# Patient Record
Sex: FEMALE | Race: WHITE | NOT HISPANIC OR LATINO
[De-identification: names, ages, dates, MRNs, and addresses within clinical notes are randomized per-mention and may not be internally consistent; named-entity substitution may affect disease eponyms.]

---

## 2020-12-18 ENCOUNTER — TRANSCRIPTION ENCOUNTER (OUTPATIENT)
Age: 75
End: 2020-12-18

## 2020-12-22 ENCOUNTER — TRANSCRIPTION ENCOUNTER (OUTPATIENT)
Age: 75
End: 2020-12-22

## 2021-05-18 ENCOUNTER — INPATIENT (INPATIENT)
Facility: HOSPITAL | Age: 76
LOS: 15 days | Discharge: HOME HEALTH | End: 2021-06-03
Attending: SURGERY | Admitting: SURGERY
Payer: MEDICARE

## 2021-05-18 VITALS
DIASTOLIC BLOOD PRESSURE: 51 MMHG | HEIGHT: 59 IN | RESPIRATION RATE: 16 BRPM | HEART RATE: 122 BPM | WEIGHT: 139.99 LBS | SYSTOLIC BLOOD PRESSURE: 120 MMHG | OXYGEN SATURATION: 97 % | TEMPERATURE: 98 F

## 2021-05-18 LAB
ALBUMIN SERPL ELPH-MCNC: 3 G/DL — LOW (ref 3.5–5.2)
ALP SERPL-CCNC: 122 U/L — HIGH (ref 30–115)
ALT FLD-CCNC: 14 U/L — SIGNIFICANT CHANGE UP (ref 0–41)
ANION GAP SERPL CALC-SCNC: 14 MMOL/L — SIGNIFICANT CHANGE UP (ref 7–14)
ANION GAP SERPL CALC-SCNC: 16 MMOL/L — HIGH (ref 7–14)
ANION GAP SERPL CALC-SCNC: 16 MMOL/L — HIGH (ref 7–14)
APPEARANCE UR: ABNORMAL
APTT BLD: 26.2 SEC — LOW (ref 27–39.2)
AST SERPL-CCNC: 10 U/L — SIGNIFICANT CHANGE UP (ref 0–41)
BACTERIA # UR AUTO: ABNORMAL
BASOPHILS # BLD AUTO: 0 K/UL — SIGNIFICANT CHANGE UP (ref 0–0.2)
BASOPHILS # BLD AUTO: 0.03 K/UL — SIGNIFICANT CHANGE UP (ref 0–0.2)
BASOPHILS # BLD AUTO: 0.09 K/UL — SIGNIFICANT CHANGE UP (ref 0–0.2)
BASOPHILS NFR BLD AUTO: 0 % — SIGNIFICANT CHANGE UP (ref 0–1)
BASOPHILS NFR BLD AUTO: 0.2 % — SIGNIFICANT CHANGE UP (ref 0–1)
BASOPHILS NFR BLD AUTO: 0.5 % — SIGNIFICANT CHANGE UP (ref 0–1)
BILIRUB SERPL-MCNC: 0.5 MG/DL — SIGNIFICANT CHANGE UP (ref 0.2–1.2)
BILIRUB UR-MCNC: NEGATIVE — SIGNIFICANT CHANGE UP
BLD GP AB SCN SERPL QL: SIGNIFICANT CHANGE UP
BUN SERPL-MCNC: 32 MG/DL — HIGH (ref 10–20)
BUN SERPL-MCNC: 32 MG/DL — HIGH (ref 10–20)
BUN SERPL-MCNC: 37 MG/DL — HIGH (ref 10–20)
BURR CELLS BLD QL SMEAR: PRESENT — SIGNIFICANT CHANGE UP
CALCIUM SERPL-MCNC: 7.8 MG/DL — LOW (ref 8.5–10.1)
CALCIUM SERPL-MCNC: 8.1 MG/DL — LOW (ref 8.5–10.1)
CALCIUM SERPL-MCNC: 8.6 MG/DL — SIGNIFICANT CHANGE UP (ref 8.5–10.1)
CHLORIDE SERPL-SCNC: 102 MMOL/L — SIGNIFICANT CHANGE UP (ref 98–110)
CHLORIDE SERPL-SCNC: 95 MMOL/L — LOW (ref 98–110)
CHLORIDE SERPL-SCNC: 98 MMOL/L — SIGNIFICANT CHANGE UP (ref 98–110)
CK MB CFR SERPL CALC: 1.2 NG/ML — SIGNIFICANT CHANGE UP (ref 0.6–6.3)
CK SERPL-CCNC: 17 U/L — SIGNIFICANT CHANGE UP (ref 0–225)
CO2 SERPL-SCNC: 19 MMOL/L — SIGNIFICANT CHANGE UP (ref 17–32)
CO2 SERPL-SCNC: 20 MMOL/L — SIGNIFICANT CHANGE UP (ref 17–32)
CO2 SERPL-SCNC: 20 MMOL/L — SIGNIFICANT CHANGE UP (ref 17–32)
COLOR SPEC: YELLOW — SIGNIFICANT CHANGE UP
CREAT SERPL-MCNC: 0.9 MG/DL — SIGNIFICANT CHANGE UP (ref 0.7–1.5)
CREAT SERPL-MCNC: 1 MG/DL — SIGNIFICANT CHANGE UP (ref 0.7–1.5)
CREAT SERPL-MCNC: 1.4 MG/DL — SIGNIFICANT CHANGE UP (ref 0.7–1.5)
DIFF PNL FLD: NEGATIVE — SIGNIFICANT CHANGE UP
EOSINOPHIL # BLD AUTO: 0 K/UL — SIGNIFICANT CHANGE UP (ref 0–0.7)
EOSINOPHIL # BLD AUTO: 0 K/UL — SIGNIFICANT CHANGE UP (ref 0–0.7)
EOSINOPHIL # BLD AUTO: 0.04 K/UL — SIGNIFICANT CHANGE UP (ref 0–0.7)
EOSINOPHIL NFR BLD AUTO: 0 % — SIGNIFICANT CHANGE UP (ref 0–8)
EOSINOPHIL NFR BLD AUTO: 0 % — SIGNIFICANT CHANGE UP (ref 0–8)
EOSINOPHIL NFR BLD AUTO: 0.3 % — SIGNIFICANT CHANGE UP (ref 0–8)
EPI CELLS # UR: 16 /HPF — HIGH (ref 0–5)
GIANT PLATELETS BLD QL SMEAR: PRESENT — SIGNIFICANT CHANGE UP
GLUCOSE SERPL-MCNC: 125 MG/DL — HIGH (ref 70–99)
GLUCOSE SERPL-MCNC: 131 MG/DL — HIGH (ref 70–99)
GLUCOSE SERPL-MCNC: 143 MG/DL — HIGH (ref 70–99)
GLUCOSE UR QL: NEGATIVE — SIGNIFICANT CHANGE UP
HCT VFR BLD CALC: 30.1 % — LOW (ref 37–47)
HCT VFR BLD CALC: 31.2 % — LOW (ref 37–47)
HCT VFR BLD CALC: 35 % — LOW (ref 37–47)
HGB BLD-MCNC: 10.2 G/DL — LOW (ref 12–16)
HGB BLD-MCNC: 10.5 G/DL — LOW (ref 12–16)
HGB BLD-MCNC: 11.7 G/DL — LOW (ref 12–16)
HYALINE CASTS # UR AUTO: 14 /LPF — HIGH (ref 0–7)
IMM GRANULOCYTES NFR BLD AUTO: 0.5 % — HIGH (ref 0.1–0.3)
IMM GRANULOCYTES NFR BLD AUTO: 1.4 % — HIGH (ref 0.1–0.3)
INR BLD: 1.45 RATIO — HIGH (ref 0.65–1.3)
KETONES UR-MCNC: NEGATIVE — SIGNIFICANT CHANGE UP
LACTATE SERPL-SCNC: 1.3 MMOL/L — SIGNIFICANT CHANGE UP (ref 0.7–2)
LACTATE SERPL-SCNC: 1.5 MMOL/L — SIGNIFICANT CHANGE UP (ref 0.7–2)
LACTATE SERPL-SCNC: 1.9 MMOL/L — SIGNIFICANT CHANGE UP (ref 0.7–2)
LACTATE SERPL-SCNC: 2.6 MMOL/L — HIGH (ref 0.7–2)
LEUKOCYTE ESTERASE UR-ACNC: NEGATIVE — SIGNIFICANT CHANGE UP
LYMPHOCYTES # BLD AUTO: 0.23 K/UL — LOW (ref 1.2–3.4)
LYMPHOCYTES # BLD AUTO: 0.36 K/UL — LOW (ref 1.2–3.4)
LYMPHOCYTES # BLD AUTO: 0.47 K/UL — LOW (ref 1.2–3.4)
LYMPHOCYTES # BLD AUTO: 1.7 % — LOW (ref 20.5–51.1)
LYMPHOCYTES # BLD AUTO: 2.2 % — LOW (ref 20.5–51.1)
LYMPHOCYTES # BLD AUTO: 3.7 % — LOW (ref 20.5–51.1)
MAGNESIUM SERPL-MCNC: 1.8 MG/DL — SIGNIFICANT CHANGE UP (ref 1.8–2.4)
MAGNESIUM SERPL-MCNC: 2.1 MG/DL — SIGNIFICANT CHANGE UP (ref 1.8–2.4)
MANUAL SMEAR VERIFICATION: SIGNIFICANT CHANGE UP
MCHC RBC-ENTMCNC: 27.6 PG — SIGNIFICANT CHANGE UP (ref 27–31)
MCHC RBC-ENTMCNC: 27.8 PG — SIGNIFICANT CHANGE UP (ref 27–31)
MCHC RBC-ENTMCNC: 27.9 PG — SIGNIFICANT CHANGE UP (ref 27–31)
MCHC RBC-ENTMCNC: 33.4 G/DL — SIGNIFICANT CHANGE UP (ref 32–37)
MCHC RBC-ENTMCNC: 33.7 G/DL — SIGNIFICANT CHANGE UP (ref 32–37)
MCHC RBC-ENTMCNC: 33.9 G/DL — SIGNIFICANT CHANGE UP (ref 32–37)
MCV RBC AUTO: 81.6 FL — SIGNIFICANT CHANGE UP (ref 81–99)
MCV RBC AUTO: 82.5 FL — SIGNIFICANT CHANGE UP (ref 81–99)
MCV RBC AUTO: 83.3 FL — SIGNIFICANT CHANGE UP (ref 81–99)
METAMYELOCYTES # FLD: 2.6 % — HIGH (ref 0–0)
MONOCYTES # BLD AUTO: 0.48 K/UL — SIGNIFICANT CHANGE UP (ref 0.1–0.6)
MONOCYTES # BLD AUTO: 0.48 K/UL — SIGNIFICANT CHANGE UP (ref 0.1–0.6)
MONOCYTES # BLD AUTO: 0.65 K/UL — HIGH (ref 0.1–0.6)
MONOCYTES NFR BLD AUTO: 3.5 % — SIGNIFICANT CHANGE UP (ref 1.7–9.3)
MONOCYTES NFR BLD AUTO: 3.8 % — SIGNIFICANT CHANGE UP (ref 1.7–9.3)
MONOCYTES NFR BLD AUTO: 3.9 % — SIGNIFICANT CHANGE UP (ref 1.7–9.3)
NEUTROPHILS # BLD AUTO: 11.55 K/UL — HIGH (ref 1.4–6.5)
NEUTROPHILS # BLD AUTO: 12.54 K/UL — HIGH (ref 1.4–6.5)
NEUTROPHILS # BLD AUTO: 15.39 K/UL — HIGH (ref 1.4–6.5)
NEUTROPHILS NFR BLD AUTO: 91.3 % — HIGH (ref 42.2–75.2)
NEUTROPHILS NFR BLD AUTO: 91.5 % — HIGH (ref 42.2–75.2)
NEUTROPHILS NFR BLD AUTO: 92 % — HIGH (ref 42.2–75.2)
NEUTS BAND # BLD: 0.9 % — SIGNIFICANT CHANGE UP (ref 0–6)
NITRITE UR-MCNC: NEGATIVE — SIGNIFICANT CHANGE UP
NRBC # BLD: 0 /100 WBCS — SIGNIFICANT CHANGE UP (ref 0–0)
NRBC # BLD: 0 /100 WBCS — SIGNIFICANT CHANGE UP (ref 0–0)
PH UR: 6 — SIGNIFICANT CHANGE UP (ref 5–8)
PHOSPHATE SERPL-MCNC: 2.8 MG/DL — SIGNIFICANT CHANGE UP (ref 2.1–4.9)
PHOSPHATE SERPL-MCNC: 3.3 MG/DL — SIGNIFICANT CHANGE UP (ref 2.1–4.9)
PLAT MORPH BLD: NORMAL — SIGNIFICANT CHANGE UP
PLATELET # BLD AUTO: 324 K/UL — SIGNIFICANT CHANGE UP (ref 130–400)
PLATELET # BLD AUTO: 338 K/UL — SIGNIFICANT CHANGE UP (ref 130–400)
PLATELET # BLD AUTO: 349 K/UL — SIGNIFICANT CHANGE UP (ref 130–400)
POIKILOCYTOSIS BLD QL AUTO: SIGNIFICANT CHANGE UP
POLYCHROMASIA BLD QL SMEAR: SLIGHT — SIGNIFICANT CHANGE UP
POTASSIUM SERPL-MCNC: 3.2 MMOL/L — LOW (ref 3.5–5)
POTASSIUM SERPL-SCNC: 3.2 MMOL/L — LOW (ref 3.5–5)
PROT SERPL-MCNC: 5.6 G/DL — LOW (ref 6–8)
PROT UR-MCNC: ABNORMAL
PROTHROM AB SERPL-ACNC: 16.7 SEC — HIGH (ref 9.95–12.87)
RBC # BLD: 3.69 M/UL — LOW (ref 4.2–5.4)
RBC # BLD: 3.78 M/UL — LOW (ref 4.2–5.4)
RBC # BLD: 4.2 M/UL — SIGNIFICANT CHANGE UP (ref 4.2–5.4)
RBC # FLD: 14 % — SIGNIFICANT CHANGE UP (ref 11.5–14.5)
RBC # FLD: 14.2 % — SIGNIFICANT CHANGE UP (ref 11.5–14.5)
RBC # FLD: 14.3 % — SIGNIFICANT CHANGE UP (ref 11.5–14.5)
RBC BLD AUTO: ABNORMAL
RBC CASTS # UR COMP ASSIST: 8 /HPF — HIGH (ref 0–4)
SARS-COV-2 RNA SPEC QL NAA+PROBE: SIGNIFICANT CHANGE UP
SODIUM SERPL-SCNC: 131 MMOL/L — LOW (ref 135–146)
SODIUM SERPL-SCNC: 134 MMOL/L — LOW (ref 135–146)
SODIUM SERPL-SCNC: 135 MMOL/L — SIGNIFICANT CHANGE UP (ref 135–146)
SP GR SPEC: 1.02 — SIGNIFICANT CHANGE UP (ref 1.01–1.03)
TROPONIN T SERPL-MCNC: <0.01 NG/ML — SIGNIFICANT CHANGE UP
TROPONIN T SERPL-MCNC: <0.01 NG/ML — SIGNIFICANT CHANGE UP
UROBILINOGEN FLD QL: SIGNIFICANT CHANGE UP
WBC # BLD: 12.63 K/UL — HIGH (ref 4.8–10.8)
WBC # BLD: 13.6 K/UL — HIGH (ref 4.8–10.8)
WBC # BLD: 16.72 K/UL — HIGH (ref 4.8–10.8)
WBC # FLD AUTO: 12.63 K/UL — HIGH (ref 4.8–10.8)
WBC # FLD AUTO: 13.6 K/UL — HIGH (ref 4.8–10.8)
WBC # FLD AUTO: 16.72 K/UL — HIGH (ref 4.8–10.8)
WBC UR QL: 16 /HPF — HIGH (ref 0–5)

## 2021-05-18 PROCEDURE — 76937 US GUIDE VASCULAR ACCESS: CPT | Mod: 26

## 2021-05-18 PROCEDURE — 93010 ELECTROCARDIOGRAM REPORT: CPT | Mod: 76

## 2021-05-18 PROCEDURE — 36000 PLACE NEEDLE IN VEIN: CPT

## 2021-05-18 PROCEDURE — 99285 EMERGENCY DEPT VISIT HI MDM: CPT | Mod: 25

## 2021-05-18 PROCEDURE — 71045 X-RAY EXAM CHEST 1 VIEW: CPT | Mod: 26

## 2021-05-18 RX ORDER — PANTOPRAZOLE SODIUM 20 MG/1
40 TABLET, DELAYED RELEASE ORAL
Refills: 0 | Status: DISCONTINUED | OUTPATIENT
Start: 2021-05-18 | End: 2021-05-20

## 2021-05-18 RX ORDER — TRAZODONE HCL 50 MG
50 TABLET ORAL AT BEDTIME
Refills: 0 | Status: DISCONTINUED | OUTPATIENT
Start: 2021-05-18 | End: 2021-05-20

## 2021-05-18 RX ORDER — IBUPROFEN 200 MG
400 TABLET ORAL EVERY 6 HOURS
Refills: 0 | Status: DISCONTINUED | OUTPATIENT
Start: 2021-05-18 | End: 2021-05-20

## 2021-05-18 RX ORDER — HEPARIN SODIUM 5000 [USP'U]/ML
5000 INJECTION INTRAVENOUS; SUBCUTANEOUS EVERY 8 HOURS
Refills: 0 | Status: DISCONTINUED | OUTPATIENT
Start: 2021-05-18 | End: 2021-05-20

## 2021-05-18 RX ORDER — MORPHINE SULFATE 50 MG/1
2 CAPSULE, EXTENDED RELEASE ORAL ONCE
Refills: 0 | Status: DISCONTINUED | OUTPATIENT
Start: 2021-05-18 | End: 2021-05-18

## 2021-05-18 RX ORDER — VANCOMYCIN HCL 1 G
1000 VIAL (EA) INTRAVENOUS ONCE
Refills: 0 | Status: COMPLETED | OUTPATIENT
Start: 2021-05-18 | End: 2021-05-18

## 2021-05-18 RX ORDER — PIPERACILLIN AND TAZOBACTAM 4; .5 G/20ML; G/20ML
3.38 INJECTION, POWDER, LYOPHILIZED, FOR SOLUTION INTRAVENOUS ONCE
Refills: 0 | Status: COMPLETED | OUTPATIENT
Start: 2021-05-18 | End: 2021-05-18

## 2021-05-18 RX ORDER — SENNA PLUS 8.6 MG/1
2 TABLET ORAL AT BEDTIME
Refills: 0 | Status: DISCONTINUED | OUTPATIENT
Start: 2021-05-18 | End: 2021-05-20

## 2021-05-18 RX ORDER — POTASSIUM CHLORIDE 20 MEQ
20 PACKET (EA) ORAL ONCE
Refills: 0 | Status: COMPLETED | OUTPATIENT
Start: 2021-05-18 | End: 2021-05-18

## 2021-05-18 RX ORDER — POTASSIUM CHLORIDE 20 MEQ
40 PACKET (EA) ORAL ONCE
Refills: 0 | Status: DISCONTINUED | OUTPATIENT
Start: 2021-05-18 | End: 2021-05-18

## 2021-05-18 RX ORDER — ACETAMINOPHEN 500 MG
650 TABLET ORAL EVERY 6 HOURS
Refills: 0 | Status: DISCONTINUED | OUTPATIENT
Start: 2021-05-18 | End: 2021-05-20

## 2021-05-18 RX ORDER — ONDANSETRON 8 MG/1
4 TABLET, FILM COATED ORAL ONCE
Refills: 0 | Status: COMPLETED | OUTPATIENT
Start: 2021-05-18 | End: 2021-05-18

## 2021-05-18 RX ORDER — MORPHINE SULFATE 50 MG/1
4 CAPSULE, EXTENDED RELEASE ORAL ONCE
Refills: 0 | Status: DISCONTINUED | OUTPATIENT
Start: 2021-05-18 | End: 2021-05-18

## 2021-05-18 RX ORDER — OXYCODONE HYDROCHLORIDE 5 MG/1
5 TABLET ORAL EVERY 6 HOURS
Refills: 0 | Status: DISCONTINUED | OUTPATIENT
Start: 2021-05-18 | End: 2021-05-20

## 2021-05-18 RX ORDER — POTASSIUM CHLORIDE 20 MEQ
20 PACKET (EA) ORAL
Refills: 0 | Status: COMPLETED | OUTPATIENT
Start: 2021-05-18 | End: 2021-05-19

## 2021-05-18 RX ORDER — ACETAMINOPHEN 500 MG
650 TABLET ORAL ONCE
Refills: 0 | Status: COMPLETED | OUTPATIENT
Start: 2021-05-18 | End: 2021-05-18

## 2021-05-18 RX ORDER — LISINOPRIL 2.5 MG/1
20 TABLET ORAL DAILY
Refills: 0 | Status: DISCONTINUED | OUTPATIENT
Start: 2021-05-18 | End: 2021-05-20

## 2021-05-18 RX ORDER — ALPRAZOLAM 0.25 MG
0.25 TABLET ORAL
Refills: 0 | Status: DISCONTINUED | OUTPATIENT
Start: 2021-05-18 | End: 2021-05-18

## 2021-05-18 RX ORDER — CHLORHEXIDINE GLUCONATE 213 G/1000ML
1 SOLUTION TOPICAL
Refills: 0 | Status: DISCONTINUED | OUTPATIENT
Start: 2021-05-18 | End: 2021-05-20

## 2021-05-18 RX ORDER — SODIUM CHLORIDE 9 MG/ML
1000 INJECTION, SOLUTION INTRAVENOUS
Refills: 0 | Status: DISCONTINUED | OUTPATIENT
Start: 2021-05-18 | End: 2021-05-18

## 2021-05-18 RX ORDER — SODIUM CHLORIDE 9 MG/ML
1950 INJECTION, SOLUTION INTRAVENOUS ONCE
Refills: 0 | Status: COMPLETED | OUTPATIENT
Start: 2021-05-18 | End: 2021-05-18

## 2021-05-18 RX ORDER — DEXTROSE MONOHYDRATE, SODIUM CHLORIDE, AND POTASSIUM CHLORIDE 50; .745; 4.5 G/1000ML; G/1000ML; G/1000ML
1000 INJECTION, SOLUTION INTRAVENOUS
Refills: 0 | Status: DISCONTINUED | OUTPATIENT
Start: 2021-05-18 | End: 2021-05-20

## 2021-05-18 RX ORDER — MAGNESIUM SULFATE 500 MG/ML
2 VIAL (ML) INJECTION ONCE
Refills: 0 | Status: COMPLETED | OUTPATIENT
Start: 2021-05-18 | End: 2021-05-18

## 2021-05-18 RX ORDER — POTASSIUM CHLORIDE 20 MEQ
20 PACKET (EA) ORAL ONCE
Refills: 0 | Status: DISCONTINUED | OUTPATIENT
Start: 2021-05-18 | End: 2021-05-18

## 2021-05-18 RX ORDER — LISINOPRIL 2.5 MG/1
0 TABLET ORAL
Qty: 0 | Refills: 1 | DISCHARGE

## 2021-05-18 RX ORDER — POTASSIUM CHLORIDE 20 MEQ
40 PACKET (EA) ORAL ONCE
Refills: 0 | Status: COMPLETED | OUTPATIENT
Start: 2021-05-18 | End: 2021-05-18

## 2021-05-18 RX ADMIN — Medication 50 GRAM(S): at 15:13

## 2021-05-18 RX ADMIN — Medication 1 TABLET(S): at 17:58

## 2021-05-18 RX ADMIN — Medication 650 MILLIGRAM(S): at 10:12

## 2021-05-18 RX ADMIN — Medication 250 MILLIGRAM(S): at 11:57

## 2021-05-18 RX ADMIN — Medication 40 MILLIEQUIVALENT(S): at 12:30

## 2021-05-18 RX ADMIN — MORPHINE SULFATE 2 MILLIGRAM(S): 50 CAPSULE, EXTENDED RELEASE ORAL at 15:23

## 2021-05-18 RX ADMIN — SODIUM CHLORIDE 100 MILLILITER(S): 9 INJECTION, SOLUTION INTRAVENOUS at 17:59

## 2021-05-18 RX ADMIN — OXYCODONE HYDROCHLORIDE 5 MILLIGRAM(S): 5 TABLET ORAL at 22:34

## 2021-05-18 RX ADMIN — Medication 50 MILLIEQUIVALENT(S): at 13:15

## 2021-05-18 RX ADMIN — PIPERACILLIN AND TAZOBACTAM 200 GRAM(S): 4; .5 INJECTION, POWDER, LYOPHILIZED, FOR SOLUTION INTRAVENOUS at 11:56

## 2021-05-18 RX ADMIN — Medication 650 MILLIGRAM(S): at 22:35

## 2021-05-18 RX ADMIN — Medication 100 MILLIGRAM(S): at 10:35

## 2021-05-18 RX ADMIN — HEPARIN SODIUM 5000 UNIT(S): 5000 INJECTION INTRAVENOUS; SUBCUTANEOUS at 21:14

## 2021-05-18 RX ADMIN — MORPHINE SULFATE 4 MILLIGRAM(S): 50 CAPSULE, EXTENDED RELEASE ORAL at 10:12

## 2021-05-18 RX ADMIN — HEPARIN SODIUM 5000 UNIT(S): 5000 INJECTION INTRAVENOUS; SUBCUTANEOUS at 15:13

## 2021-05-18 RX ADMIN — Medication 650 MILLIGRAM(S): at 21:13

## 2021-05-18 RX ADMIN — SODIUM CHLORIDE 1950 MILLILITER(S): 9 INJECTION, SOLUTION INTRAVENOUS at 10:12

## 2021-05-18 RX ADMIN — OXYCODONE HYDROCHLORIDE 5 MILLIGRAM(S): 5 TABLET ORAL at 23:37

## 2021-05-18 RX ADMIN — SODIUM CHLORIDE 100 MILLILITER(S): 9 INJECTION, SOLUTION INTRAVENOUS at 15:29

## 2021-05-18 RX ADMIN — SENNA PLUS 2 TABLET(S): 8.6 TABLET ORAL at 21:14

## 2021-05-18 NOTE — ED PROVIDER NOTE - CARE PLAN
Principal Discharge DX:	Sepsis  Secondary Diagnosis:	Breast infection in female  Secondary Diagnosis:	Hypokalemia

## 2021-05-18 NOTE — ED PROVIDER NOTE - PROGRESS NOTE DETAILS
DC: Surgery consulted. will evaluate. BI: 75 y.o F w/ pmhx DM?, HTN? p/w pain to R breast. Pt had biopsy done appx 10 days ago and since then has been having progressively worsening R breast pain at site of biopsy DC: Patient evaluated by surgery. SEPSIS labs ordered. pending dispo.

## 2021-05-18 NOTE — ED PROVIDER NOTE - ADMIT DISPOSITION PRESENT ON ADMISSION SEPSIS Q3 - REPEAT LACTATE WAS DRAWN
Pt at this time is not a rehab candidate and is unable to actively participate in PT, therefore d/c from PT program at this time. Repeat lactate was drawn.

## 2021-05-18 NOTE — H&P ADULT - HISTORY OF PRESENT ILLNESS
75F w/ PMH of HTN and b/l lumpectomies (10-15 years ago, reportedly benign pathology) presented to the ED with worsening R breast pain. Pt reports R breast biopsy last week Tuesday, 5/11. She was told to keep the dressing on for 5 days. Since the biopsy, she has been experiencing R breast pain. 2 days ago, she removed the dressing and saw erythema and small amount of necrotic tissue overlying R underside of breast. This morning the abscess ruptured and began putting out pus and necrotic tissue. In the ED, T 100.9, tachycardic to 110s.     Of note, pt reports 30 pound weight loss in the past 3 months. Denies PMH of cancer. 75F w/ PMH of HTN and b/l lumpectomies (10-15 years ago, reportedly benign pathology) presented to the ED with worsening R breast pain. Pt reports R breast biopsy last week Tuesday, 5/11. She was told to keep the dressing on for 5 days. Since the biopsy, she has been experiencing R breast pain. 2 days ago, she removed the dressing and saw erythema and small amount of necrotic tissue overlying R underside of breast. This morning the abscess ruptured and began putting out pus and necrotic tissue. In the ED, T 100.9, tachycardic to 110s.     Of note, pt reports 30 pound weight loss in the past 3 months. Denies PMH of cancer. In the ED, pt tachycardic to 110s, febrile to 100.9F. WBC 16.7, lactate 2.6. Received 1950cc LR, vancomycin, zosyn, and clindamycin.

## 2021-05-18 NOTE — ED ADULT TRIAGE NOTE - CHIEF COMPLAINT QUOTE
patient brought in for wound check to right breast, patient had biopsy to right breast a week ago. patient has drainage, and odor to site

## 2021-05-18 NOTE — H&P ADULT - NSHPPHYSICALEXAM_GEN_ALL_CORE
GENERAL: NAD  BREAST: R breast erythema/induration from 3 o'clock to 9 o'clock, necrotic tissue overlying, skin sloughing  ABDOMEN: Soft, Nontender, Nondistended;   EXTREMITIES: No clubbing, cyanosis, or edema

## 2021-05-18 NOTE — H&P ADULT - ASSESSMENT
75F w/ PMH of HTN and b/l lumpectomies (10-15 years ago, reportedly benign pathology) presented to the ED with worsening R breast pain found to have a necrotizing right breast infection.     PLAN:   - admit to surgery under Dr. Wagner  - R breast s/p bedside incision and drainage with findings of necrotic tissue  - augmentin   - will redress wound later today to decide regarding need for OR  - d/w pt, pt's daughter, and Dr. Wagner 75F w/ PMH of HTN and b/l lumpectomies (10-15 years ago, reportedly benign pathology) presented to the ED with worsening R breast pain found to have a necrotizing right breast infection.     PLAN:   - admit to surgery under Dr. Wagner  - R breast abscess s/p bedside incision and drainage with findings of necrotic tissue; swab sent for culture  - abx: augmentin   - will redress wound later today to decide regarding need for OR  - abnormal EKG, negative troponin --> will repeat EKG  - FU 4pm labs  - d/w pt, pt's daughter, and Dr. Wagner

## 2021-05-18 NOTE — ED PROVIDER NOTE - NS ED MD TWO NIGHTS YN
Refill for [place] pending Physician approval, script cannot be e-scribed, please route back to pool after completed so patient can be called when ready for  [phone].     Patient last seen 12/5/16  Last prescribed 1/5/17 Yes

## 2021-05-18 NOTE — H&P ADULT - NSHPLABSRESULTS_GEN_ALL_CORE
11.7   16.72 )-----------( 349      ( 18 May 2021 10:30 )             35.0       Auto Neutrophil %: 92.0 % (05-18-21 @ 10:30)  Auto Immature Granulocyte %: 1.4 % (05-18-21 @ 10:30)    05-18    131<L>  |  95<L>  |  37<H>  ----------------------------<  125<H>  3.2<L>   |  20  |  1.4    Calcium, Total Serum: 8.6 mg/dL (05-18-21 @ 10:30)    LFTs:             5.6  | 0.5  | 10       ------------------[122     ( 18 May 2021 10:30 )  3.0  | x    | 14          Lactate, Blood: 2.6 mmol/L (05-18-21 @ 10:30)    Coags:     16.70  ----< 1.45    ( 18 May 2021 10:30 )     26.2      CARDIAC MARKERS ( 18 May 2021 11:30 )  x     / <0.01 ng/mL / x     / x     / x

## 2021-05-18 NOTE — ED PROVIDER NOTE - CLINICAL SUMMARY MEDICAL DECISION MAKING FREE TEXT BOX
75 y.o. female, PMH of HTN and b/l lumpectomies (10-15 years ago, reportedly benign pathology) presented to the ED with worsening R breast pain. Pt reports R breast biopsy last week Tuesday, 5/11. She was told to keep the dressing on for 5 days. Since the biopsy, she has been experiencing R breast pain. 2 days ago, she removed the dressing and saw erythema and small amount of necrotic tissue overlying R underside of breast. This morning the abscess ruptured and began putting out pus and necrotic tissue. On exam, pt in NAD, AAOx3, head NC/AT, CN II-XII intact, lungs CTA B/L, CV S1S2 regular, breast (+) necrotic right breast with surrounding erythema and large amt of foul smelling discharge, abdomen soft/NT/ND/(+)BS, ext (-) edema. Pt is septic. Labs done. IV abx given. Surgery did I&D at bedside. Will admit

## 2021-05-18 NOTE — ED PROVIDER NOTE - ATTENDING CONTRIBUTION TO CARE
75 y.o. female, PMH of HTN and b/l lumpectomies (10-15 years ago, reportedly benign pathology) presented to the ED with worsening R breast pain. Pt reports R breast biopsy last week Tuesday, 5/11. She was told to keep the dressing on for 5 days. Since the biopsy, she has been experiencing R breast pain. 2 days ago, she removed the dressing and saw erythema and small amount of necrotic tissue overlying R underside of breast. This morning the abscess ruptured and began putting out pus and necrotic tissue. On exam, pt in NAD, AAOx3, head NC/AT, CN II-XII intact, lungs CTA B/L, CV S1S2 regular, breast (+) necrotic right breast with surrounding erythema and large amt of foul smelling discharge, abdomen soft/NT/ND/(+)BS, ext (-) edema. Pt is septic. Labs done. IV abx given. Surgery did I&D at bedside. Will admit.

## 2021-05-18 NOTE — ED PROVIDER NOTE - PHYSICAL EXAMINATION
CONSTITUTIONAL: Well-developed; well-nourished; appears uncomfortable.   SKIN: warm, dry.  HEAD: Normocephalic; atraumatic.  EYES: EOMI, no conjunctival erythema.  ENT: No nasal discharge; airway clear.  NECK: Supple; non tender.  CARD: S1, S2 normal; no murmurs, gallops, or rubs. +tachycardic.   CHEST WALL: Right breast is gangrenous, fluctuant, foul smelling discharge coming from breast surrounding erythema, tenderness.  RESP: No wheezes, rales or rhonchi.  ABD: soft ntnd.  EXT: Normal ROM.  No clubbing, cyanosis or edema.   NEURO: Alert, oriented, grossly unremarkable.  PSYCH: Cooperative, appropriate.

## 2021-05-18 NOTE — ED PROCEDURE NOTE - ATTENDING CONTRIBUTION TO CARE
I was present for and supervised the key and critical aspects of the procedures performed during the care of the patient. I personally supervised the study.  I reviewed the images and interpretation by the resident/ACP and have edited where appropriate.

## 2021-05-18 NOTE — ED PROVIDER NOTE - NS ED ROS FT
Constitutional: No fevers. +chills.   Eyes:  No visual changes, eye pain or discharge.  ENMT:  No sore throat or runny nose.  Cardiac:  No chest pain, SOB or edema.   BREAST: +pain, swelling, discharge.   Respiratory:  No cough or respiratory distress. No hemoptysis.   GI:  +nausea.   :  No dysuria, frequency or burning.  MS:  No myalgia, muscle weakness, joint pain or back pain.  Neuro:  No headache or weakness.  No LOC.  Skin:  No skin rash.   Endocrine: No history of thyroid disease or diabetes.

## 2021-05-18 NOTE — ED ADULT NURSE NOTE - OBJECTIVE STATEMENT
Pt is a 75y old female c/o right breast discharge d/p biopsy 5 days ago. Pt has yellow drainage and foul odor. NKDA.

## 2021-05-19 LAB
ANION GAP SERPL CALC-SCNC: 11 MMOL/L — SIGNIFICANT CHANGE UP (ref 7–14)
ANION GAP SERPL CALC-SCNC: 12 MMOL/L — SIGNIFICANT CHANGE UP (ref 7–14)
APTT BLD: 24.4 SEC — LOW (ref 27–39.2)
BASOPHILS # BLD AUTO: 0.04 K/UL — SIGNIFICANT CHANGE UP (ref 0–0.2)
BASOPHILS NFR BLD AUTO: 0.3 % — SIGNIFICANT CHANGE UP (ref 0–1)
BLD GP AB SCN SERPL QL: SIGNIFICANT CHANGE UP
BUN SERPL-MCNC: 22 MG/DL — HIGH (ref 10–20)
BUN SERPL-MCNC: 26 MG/DL — HIGH (ref 10–20)
CALCIUM SERPL-MCNC: 7.7 MG/DL — LOW (ref 8.5–10.1)
CALCIUM SERPL-MCNC: 7.8 MG/DL — LOW (ref 8.5–10.1)
CHLORIDE SERPL-SCNC: 101 MMOL/L — SIGNIFICANT CHANGE UP (ref 98–110)
CHLORIDE SERPL-SCNC: 103 MMOL/L — SIGNIFICANT CHANGE UP (ref 98–110)
CO2 SERPL-SCNC: 18 MMOL/L — SIGNIFICANT CHANGE UP (ref 17–32)
CO2 SERPL-SCNC: 20 MMOL/L — SIGNIFICANT CHANGE UP (ref 17–32)
COVID-19 SPIKE DOMAIN AB INTERP: POSITIVE
COVID-19 SPIKE DOMAIN ANTIBODY RESULT: 140 U/ML — HIGH
CREAT SERPL-MCNC: 0.7 MG/DL — SIGNIFICANT CHANGE UP (ref 0.7–1.5)
CREAT SERPL-MCNC: 0.8 MG/DL — SIGNIFICANT CHANGE UP (ref 0.7–1.5)
EOSINOPHIL # BLD AUTO: 0.1 K/UL — SIGNIFICANT CHANGE UP (ref 0–0.7)
EOSINOPHIL NFR BLD AUTO: 0.8 % — SIGNIFICANT CHANGE UP (ref 0–8)
GLUCOSE SERPL-MCNC: 132 MG/DL — HIGH (ref 70–99)
GLUCOSE SERPL-MCNC: 138 MG/DL — HIGH (ref 70–99)
HCT VFR BLD CALC: 30.4 % — LOW (ref 37–47)
HGB BLD-MCNC: 10.2 G/DL — LOW (ref 12–16)
IMM GRANULOCYTES NFR BLD AUTO: 1.9 % — HIGH (ref 0.1–0.3)
INR BLD: 1.23 RATIO — SIGNIFICANT CHANGE UP (ref 0.65–1.3)
LYMPHOCYTES # BLD AUTO: 0.74 K/UL — LOW (ref 1.2–3.4)
LYMPHOCYTES # BLD AUTO: 6 % — LOW (ref 20.5–51.1)
MAGNESIUM SERPL-MCNC: 1.7 MG/DL — LOW (ref 1.8–2.4)
MAGNESIUM SERPL-MCNC: 1.8 MG/DL — SIGNIFICANT CHANGE UP (ref 1.8–2.4)
MCHC RBC-ENTMCNC: 27.8 PG — SIGNIFICANT CHANGE UP (ref 27–31)
MCHC RBC-ENTMCNC: 33.6 G/DL — SIGNIFICANT CHANGE UP (ref 32–37)
MCV RBC AUTO: 82.8 FL — SIGNIFICANT CHANGE UP (ref 81–99)
MONOCYTES # BLD AUTO: 0.57 K/UL — SIGNIFICANT CHANGE UP (ref 0.1–0.6)
MONOCYTES NFR BLD AUTO: 4.6 % — SIGNIFICANT CHANGE UP (ref 1.7–9.3)
MRSA PCR RESULT.: NEGATIVE — SIGNIFICANT CHANGE UP
NEUTROPHILS # BLD AUTO: 10.7 K/UL — HIGH (ref 1.4–6.5)
NEUTROPHILS NFR BLD AUTO: 86.4 % — HIGH (ref 42.2–75.2)
NRBC # BLD: 0 /100 WBCS — SIGNIFICANT CHANGE UP (ref 0–0)
PHOSPHATE SERPL-MCNC: 2.6 MG/DL — SIGNIFICANT CHANGE UP (ref 2.1–4.9)
PHOSPHATE SERPL-MCNC: 2.8 MG/DL — SIGNIFICANT CHANGE UP (ref 2.1–4.9)
PLATELET # BLD AUTO: 313 K/UL — SIGNIFICANT CHANGE UP (ref 130–400)
POTASSIUM SERPL-MCNC: 3.7 MMOL/L — SIGNIFICANT CHANGE UP (ref 3.5–5)
POTASSIUM SERPL-MCNC: 4 MMOL/L — SIGNIFICANT CHANGE UP (ref 3.5–5)
POTASSIUM SERPL-SCNC: 3.7 MMOL/L — SIGNIFICANT CHANGE UP (ref 3.5–5)
POTASSIUM SERPL-SCNC: 4 MMOL/L — SIGNIFICANT CHANGE UP (ref 3.5–5)
PROTHROM AB SERPL-ACNC: 14.2 SEC — HIGH (ref 9.95–12.87)
RBC # BLD: 3.67 M/UL — LOW (ref 4.2–5.4)
RBC # FLD: 14.6 % — HIGH (ref 11.5–14.5)
SARS-COV-2 IGG+IGM SERPL QL IA: 140 U/ML — HIGH
SARS-COV-2 IGG+IGM SERPL QL IA: POSITIVE
SODIUM SERPL-SCNC: 132 MMOL/L — LOW (ref 135–146)
SODIUM SERPL-SCNC: 133 MMOL/L — LOW (ref 135–146)
WBC # BLD: 12.38 K/UL — HIGH (ref 4.8–10.8)
WBC # FLD AUTO: 12.38 K/UL — HIGH (ref 4.8–10.8)

## 2021-05-19 RX ORDER — AMPICILLIN SODIUM AND SULBACTAM SODIUM 250; 125 MG/ML; MG/ML
1.5 INJECTION, POWDER, FOR SUSPENSION INTRAMUSCULAR; INTRAVENOUS ONCE
Refills: 0 | Status: COMPLETED | OUTPATIENT
Start: 2021-05-19 | End: 2021-05-19

## 2021-05-19 RX ORDER — AMPICILLIN SODIUM AND SULBACTAM SODIUM 250; 125 MG/ML; MG/ML
1.5 INJECTION, POWDER, FOR SUSPENSION INTRAMUSCULAR; INTRAVENOUS EVERY 6 HOURS
Refills: 0 | Status: DISCONTINUED | OUTPATIENT
Start: 2021-05-20 | End: 2021-05-20

## 2021-05-19 RX ORDER — MAGNESIUM SULFATE 500 MG/ML
2 VIAL (ML) INJECTION ONCE
Refills: 0 | Status: COMPLETED | OUTPATIENT
Start: 2021-05-19 | End: 2021-05-19

## 2021-05-19 RX ORDER — AMPICILLIN SODIUM AND SULBACTAM SODIUM 250; 125 MG/ML; MG/ML
INJECTION, POWDER, FOR SUSPENSION INTRAMUSCULAR; INTRAVENOUS
Refills: 0 | Status: DISCONTINUED | OUTPATIENT
Start: 2021-05-19 | End: 2021-05-20

## 2021-05-19 RX ADMIN — LISINOPRIL 20 MILLIGRAM(S): 2.5 TABLET ORAL at 05:05

## 2021-05-19 RX ADMIN — HEPARIN SODIUM 5000 UNIT(S): 5000 INJECTION INTRAVENOUS; SUBCUTANEOUS at 21:35

## 2021-05-19 RX ADMIN — Medication 1 TABLET(S): at 05:05

## 2021-05-19 RX ADMIN — Medication 650 MILLIGRAM(S): at 14:44

## 2021-05-19 RX ADMIN — Medication 16.67 GRAM(S): at 06:28

## 2021-05-19 RX ADMIN — Medication 50 MILLIGRAM(S): at 00:46

## 2021-05-19 RX ADMIN — Medication 650 MILLIGRAM(S): at 14:53

## 2021-05-19 RX ADMIN — DEXTROSE MONOHYDRATE, SODIUM CHLORIDE, AND POTASSIUM CHLORIDE 100 MILLILITER(S): 50; .745; 4.5 INJECTION, SOLUTION INTRAVENOUS at 15:20

## 2021-05-19 RX ADMIN — SENNA PLUS 2 TABLET(S): 8.6 TABLET ORAL at 21:35

## 2021-05-19 RX ADMIN — DEXTROSE MONOHYDRATE, SODIUM CHLORIDE, AND POTASSIUM CHLORIDE 100 MILLILITER(S): 50; .745; 4.5 INJECTION, SOLUTION INTRAVENOUS at 00:45

## 2021-05-19 RX ADMIN — PANTOPRAZOLE SODIUM 40 MILLIGRAM(S): 20 TABLET, DELAYED RELEASE ORAL at 05:06

## 2021-05-19 RX ADMIN — HEPARIN SODIUM 5000 UNIT(S): 5000 INJECTION INTRAVENOUS; SUBCUTANEOUS at 14:42

## 2021-05-19 RX ADMIN — Medication 50 MILLIEQUIVALENT(S): at 00:45

## 2021-05-19 RX ADMIN — Medication 50 MILLIEQUIVALENT(S): at 05:05

## 2021-05-19 RX ADMIN — OXYCODONE HYDROCHLORIDE 5 MILLIGRAM(S): 5 TABLET ORAL at 08:47

## 2021-05-19 RX ADMIN — Medication 50 MILLIEQUIVALENT(S): at 02:59

## 2021-05-19 RX ADMIN — OXYCODONE HYDROCHLORIDE 5 MILLIGRAM(S): 5 TABLET ORAL at 11:12

## 2021-05-19 RX ADMIN — AMPICILLIN SODIUM AND SULBACTAM SODIUM 100 GRAM(S): 250; 125 INJECTION, POWDER, FOR SUSPENSION INTRAMUSCULAR; INTRAVENOUS at 16:58

## 2021-05-19 RX ADMIN — HEPARIN SODIUM 5000 UNIT(S): 5000 INJECTION INTRAVENOUS; SUBCUTANEOUS at 05:06

## 2021-05-19 NOTE — PROGRESS NOTE ADULT - SUBJECTIVE AND OBJECTIVE BOX
Progress Note: General Surgery  Patient: CASSIDY NOE , 75y (1945)Female   MRN: [id number]  Location: [address]  Visit: 21 Inpatient  Date: 21 @ 02:24    Admit Diagnosis/Chief Complaint:     Procedure/Diagnosis:  S/P     Events/ 24h: Patient seen and examined at bedside. No acute events overnight. Afebrile, VSS.    Vitals: T(F): 98.6 (21 @ 01:00), Max: 100.9 (21 @ 09:45)  HR: 99 (21 @ 01:00)  BP: 119/56 (21 @ 01:00) (119/56 - 130/76)  RR: 18 (21 @ 01:00)  SpO2: 99% (21 @ 13:33)    In:   21 @ 07:01  -  21 @ 02:24  --------------------------------------------------------  IN: 1120 mL      Out:   21 @ 07:01  -  21 @ 02:24  --------------------------------------------------------  OUT:    Voided (mL): 500 mL  Total OUT: 500 mL        Net:   21 @ 07:01  -  21 @ 02:24  --------------------------------------------------------  NET: 620 mL        Diet: Diet, DASH/TLC:   Sodium & Cholesterol Restricted (21 @ 13:46)    IV Fluids: dextrose 5% + sodium chloride 0.45% with potassium chloride 20 mEq/L 1000 milliLiter(s) (100 mL/Hr) IV Continuous <Continuous>  potassium chloride  20 mEq/100 mL IVPB 20 milliEquivalent(s) IV Intermittent every 2 hours  sodium phosphate IVPB 15 milliMole(s) IV Intermittent once      Physical Examination:  General Appearance: NAD   HEENT: EOMI, sclera anicteric.  Heart: RRR   Lungs: Symmetric chest wall expansion, equal rise and fall.  BREAST: R breast erythema/induration from 3 o'clock to 9 o'clock, necrotic tissue overlying, skin sloughing. extremely malodorous  ABDOMEN: Soft, Nontender, Nondistended;   EXTREMITIES: No clubbing, cyanosis, or edema  Skin: Warm, dry. No jaundice.       Medications: [Standing]  amoxicillin  875 milliGRAM(s)/clavulanate 1 Tablet(s) Oral every 12 hours  chlorhexidine 4% Liquid 1 Application(s) Topical <User Schedule>  dextrose 5% + sodium chloride 0.45% with potassium chloride 20 mEq/L 1000 milliLiter(s) (100 mL/Hr) IV Continuous <Continuous>  heparin   Injectable 5000 Unit(s) SubCutaneous every 8 hours  lisinopril 20 milliGRAM(s) Oral daily  pantoprazole    Tablet 40 milliGRAM(s) Oral before breakfast  potassium chloride  20 mEq/100 mL IVPB 20 milliEquivalent(s) IV Intermittent every 2 hours  senna 2 Tablet(s) Oral at bedtime  sodium phosphate IVPB 15 milliMole(s) IV Intermittent once    DVT Prophylaxis: heparin   Injectable 5000 Unit(s) SubCutaneous every 8 hours    GI Prophylaxis: pantoprazole    Tablet 40 milliGRAM(s) Oral before breakfast    Antibiotics: amoxicillin  875 milliGRAM(s)/clavulanate 1 Tablet(s) Oral every 12 hours    Anticoagulation:   Medications:[PRN]  acetaminophen   Tablet .. 650 milliGRAM(s) Oral every 6 hours PRN  ibuprofen  Tablet. 400 milliGRAM(s) Oral every 6 hours PRN  oxyCODONE    IR 5 milliGRAM(s) Oral every 6 hours PRN  traZODone 50 milliGRAM(s) Oral at bedtime PRN      Labs:                        10.2   12.63 )-----------( 324      ( 18 May 2021 22:00 )             30.1     05-18    135  |  102  |  32<H>  ----------------------------<  131<H>  3.2<L>   |  19  |  0.9    Ca    8.1<L>      18 May 2021 22:00  Phos  2.8       Mg     1.8         TPro  5.6<L>  /  Alb  3.0<L>  /  TBili  0.5  /  DBili  x   /  AST  10  /  ALT  14  /  AlkPhos  122<H>      LIVER FUNCTIONS - ( 18 May 2021 10:30 )  Alb: 3.0 g/dL / Pro: 5.6 g/dL / ALK PHOS: 122 U/L / ALT: 14 U/L / AST: 10 U/L / GGT: x           PT/INR - ( 18 May 2021 10:30 )   PT: 16.70 sec;   INR: 1.45 ratio         PTT - ( 18 May 2021 10:30 )  PTT:26.2 sec    CARDIAC MARKERS ( 18 May 2021 18:19 )  x     / <0.01 ng/mL / 17 U/L / x     / 1.2 ng/mL  CARDIAC MARKERS ( 18 May 2021 11:30 )  x     / <0.01 ng/mL / x     / x     / x            Urine/Micro:    Urinalysis Basic - ( 18 May 2021 13:17 )    Color: Yellow / Appearance: Slightly Turbid / S.019 / pH: x  Gluc: x / Ketone: Negative  / Bili: Negative / Urobili: <2 mg/dL   Blood: x / Protein: 100 mg/dL / Nitrite: Negative   Leuk Esterase: Negative / RBC: 8 /HPF / WBC 16 /HPF   Sq Epi: x / Non Sq Epi: 16 /HPF / Bacteria: Few        Imaging:   Xray Chest 1 View- PORTABLE-Urgent:   EXAM:  XR CHEST PORTABLE URGENT 1V            PROCEDURE DATE:  2021            INTERPRETATION:  Clinical History / Reason for exam: Sepsis.    Comparison : Chest radiograph None.    Technique/Positioning: Frontal view time 10:54 AM.    Findings:    The heart size is within normal limits.    Calcification within the thoracic aorta.    Left basilar opacity.    Diffuse osteopenia.    Impression:    Left basilar opacity.      FIDENCIO BERNABE MD; Attending Radiologist  This document has been electronically signed. May 18 2021  2:55PM (21 @ 11:10)

## 2021-05-19 NOTE — PROGRESS NOTE ADULT - ASSESSMENT
· Assessment	  75F w/ PMH of HTN and b/l lumpectomies (10-15 years ago, reportedly benign pathology) presented to the ED with worsening R breast pain found to have a necrotizing right breast infection.   Patient seen and examined at bedside. NAD.   Tolerating:  Diet, DASH/TLC:   Sodium & Cholesterol Restricted (05-18-21 @ 13:46)        PLAN:   - R breast abscess s/p bedside incision and drainage with findings of necrotic tissue; swab sent for culture  - abx: augmentin   - dressing changed at bedside  - pre op for OR  - Monitor vitals  - Monitor labs and replete as necessary  - Monitor for bowel function  - Continue Pain Medications if necessary  - Continue Antibiotics if necessary  - Encourage ambulation as tolerated  - Monitor urine output  - DVT and GI Prophylaxis  - Follow PT/Physiatry if necessary    Date/Time: 05-19-21 @ 02:24

## 2021-05-20 ENCOUNTER — RESULT REVIEW (OUTPATIENT)
Age: 76
End: 2021-05-20

## 2021-05-20 LAB
ANION GAP SERPL CALC-SCNC: 13 MMOL/L — SIGNIFICANT CHANGE UP (ref 7–14)
BASOPHILS # BLD AUTO: 0.03 K/UL — SIGNIFICANT CHANGE UP (ref 0–0.2)
BASOPHILS NFR BLD AUTO: 0.2 % — SIGNIFICANT CHANGE UP (ref 0–1)
BUN SERPL-MCNC: 19 MG/DL — SIGNIFICANT CHANGE UP (ref 10–20)
CALCIUM SERPL-MCNC: 7.6 MG/DL — LOW (ref 8.5–10.1)
CHLORIDE SERPL-SCNC: 103 MMOL/L — SIGNIFICANT CHANGE UP (ref 98–110)
CO2 SERPL-SCNC: 18 MMOL/L — SIGNIFICANT CHANGE UP (ref 17–32)
CREAT SERPL-MCNC: 0.8 MG/DL — SIGNIFICANT CHANGE UP (ref 0.7–1.5)
EOSINOPHIL # BLD AUTO: 0 K/UL — SIGNIFICANT CHANGE UP (ref 0–0.7)
EOSINOPHIL NFR BLD AUTO: 0 % — SIGNIFICANT CHANGE UP (ref 0–8)
GLUCOSE SERPL-MCNC: 241 MG/DL — HIGH (ref 70–99)
GRAM STN FLD: SIGNIFICANT CHANGE UP
HCT VFR BLD CALC: 27.1 % — LOW (ref 37–47)
HGB BLD-MCNC: 8.8 G/DL — LOW (ref 12–16)
IMM GRANULOCYTES NFR BLD AUTO: 1.7 % — HIGH (ref 0.1–0.3)
LYMPHOCYTES # BLD AUTO: 0.47 K/UL — LOW (ref 1.2–3.4)
LYMPHOCYTES # BLD AUTO: 2.8 % — LOW (ref 20.5–51.1)
MAGNESIUM SERPL-MCNC: 1.7 MG/DL — LOW (ref 1.8–2.4)
MCHC RBC-ENTMCNC: 27.2 PG — SIGNIFICANT CHANGE UP (ref 27–31)
MCHC RBC-ENTMCNC: 32.5 G/DL — SIGNIFICANT CHANGE UP (ref 32–37)
MCV RBC AUTO: 83.6 FL — SIGNIFICANT CHANGE UP (ref 81–99)
MONOCYTES # BLD AUTO: 0.43 K/UL — SIGNIFICANT CHANGE UP (ref 0.1–0.6)
MONOCYTES NFR BLD AUTO: 2.6 % — SIGNIFICANT CHANGE UP (ref 1.7–9.3)
NEUTROPHILS # BLD AUTO: 15.39 K/UL — HIGH (ref 1.4–6.5)
NEUTROPHILS NFR BLD AUTO: 92.7 % — HIGH (ref 42.2–75.2)
NRBC # BLD: 0 /100 WBCS — SIGNIFICANT CHANGE UP (ref 0–0)
PHOSPHATE SERPL-MCNC: 3.2 MG/DL — SIGNIFICANT CHANGE UP (ref 2.1–4.9)
PLATELET # BLD AUTO: 334 K/UL — SIGNIFICANT CHANGE UP (ref 130–400)
POTASSIUM SERPL-MCNC: 4.4 MMOL/L — SIGNIFICANT CHANGE UP (ref 3.5–5)
POTASSIUM SERPL-SCNC: 4.4 MMOL/L — SIGNIFICANT CHANGE UP (ref 3.5–5)
RBC # BLD: 3.24 M/UL — LOW (ref 4.2–5.4)
RBC # FLD: 14.9 % — HIGH (ref 11.5–14.5)
SODIUM SERPL-SCNC: 134 MMOL/L — LOW (ref 135–146)
WBC # BLD: 16.6 K/UL — HIGH (ref 4.8–10.8)
WBC # FLD AUTO: 16.6 K/UL — HIGH (ref 4.8–10.8)

## 2021-05-20 PROCEDURE — 88312 SPECIAL STAINS GROUP 1: CPT | Mod: 26

## 2021-05-20 PROCEDURE — 11045 DBRDMT SUBQ TISS EACH ADDL: CPT

## 2021-05-20 PROCEDURE — 11042 DBRDMT SUBQ TIS 1ST 20SQCM/<: CPT

## 2021-05-20 PROCEDURE — 88304 TISSUE EXAM BY PATHOLOGIST: CPT | Mod: 26

## 2021-05-20 RX ORDER — POTASSIUM CHLORIDE 20 MEQ
20 PACKET (EA) ORAL ONCE
Refills: 0 | Status: DISCONTINUED | OUTPATIENT
Start: 2021-05-20 | End: 2021-05-20

## 2021-05-20 RX ORDER — CHLORHEXIDINE GLUCONATE 213 G/1000ML
1 SOLUTION TOPICAL
Refills: 0 | Status: DISCONTINUED | OUTPATIENT
Start: 2021-05-20 | End: 2021-05-22

## 2021-05-20 RX ORDER — ACETAMINOPHEN 500 MG
650 TABLET ORAL EVERY 6 HOURS
Refills: 0 | Status: DISCONTINUED | OUTPATIENT
Start: 2021-05-20 | End: 2021-05-22

## 2021-05-20 RX ORDER — PIPERACILLIN AND TAZOBACTAM 4; .5 G/20ML; G/20ML
3.38 INJECTION, POWDER, LYOPHILIZED, FOR SOLUTION INTRAVENOUS EVERY 8 HOURS
Refills: 0 | Status: DISCONTINUED | OUTPATIENT
Start: 2021-05-20 | End: 2021-05-20

## 2021-05-20 RX ORDER — SODIUM CHLORIDE 9 MG/ML
1000 INJECTION, SOLUTION INTRAVENOUS
Refills: 0 | Status: DISCONTINUED | OUTPATIENT
Start: 2021-05-20 | End: 2021-05-20

## 2021-05-20 RX ORDER — PIPERACILLIN AND TAZOBACTAM 4; .5 G/20ML; G/20ML
3.38 INJECTION, POWDER, LYOPHILIZED, FOR SOLUTION INTRAVENOUS ONCE
Refills: 0 | Status: COMPLETED | OUTPATIENT
Start: 2021-05-20 | End: 2021-05-20

## 2021-05-20 RX ORDER — PANTOPRAZOLE SODIUM 20 MG/1
40 TABLET, DELAYED RELEASE ORAL
Refills: 0 | Status: DISCONTINUED | OUTPATIENT
Start: 2021-05-20 | End: 2021-05-22

## 2021-05-20 RX ORDER — MORPHINE SULFATE 50 MG/1
2 CAPSULE, EXTENDED RELEASE ORAL
Refills: 0 | Status: DISCONTINUED | OUTPATIENT
Start: 2021-05-20 | End: 2021-05-20

## 2021-05-20 RX ORDER — PIPERACILLIN AND TAZOBACTAM 4; .5 G/20ML; G/20ML
3.38 INJECTION, POWDER, LYOPHILIZED, FOR SOLUTION INTRAVENOUS EVERY 8 HOURS
Refills: 0 | Status: DISCONTINUED | OUTPATIENT
Start: 2021-05-20 | End: 2021-05-22

## 2021-05-20 RX ORDER — ONDANSETRON 8 MG/1
4 TABLET, FILM COATED ORAL ONCE
Refills: 0 | Status: DISCONTINUED | OUTPATIENT
Start: 2021-05-20 | End: 2021-05-20

## 2021-05-20 RX ORDER — HEPARIN SODIUM 5000 [USP'U]/ML
5000 INJECTION INTRAVENOUS; SUBCUTANEOUS EVERY 8 HOURS
Refills: 0 | Status: DISCONTINUED | OUTPATIENT
Start: 2021-05-20 | End: 2021-05-22

## 2021-05-20 RX ORDER — SENNA PLUS 8.6 MG/1
2 TABLET ORAL AT BEDTIME
Refills: 0 | Status: DISCONTINUED | OUTPATIENT
Start: 2021-05-20 | End: 2021-05-22

## 2021-05-20 RX ORDER — IBUPROFEN 200 MG
400 TABLET ORAL EVERY 6 HOURS
Refills: 0 | Status: DISCONTINUED | OUTPATIENT
Start: 2021-05-20 | End: 2021-05-22

## 2021-05-20 RX ORDER — MAGNESIUM SULFATE 500 MG/ML
2 VIAL (ML) INJECTION ONCE
Refills: 0 | Status: COMPLETED | OUTPATIENT
Start: 2021-05-20 | End: 2021-05-20

## 2021-05-20 RX ORDER — AMPICILLIN SODIUM AND SULBACTAM SODIUM 250; 125 MG/ML; MG/ML
1.5 INJECTION, POWDER, FOR SUSPENSION INTRAMUSCULAR; INTRAVENOUS ONCE
Refills: 0 | Status: DISCONTINUED | OUTPATIENT
Start: 2021-05-20 | End: 2021-05-20

## 2021-05-20 RX ORDER — OXYCODONE HYDROCHLORIDE 5 MG/1
5 TABLET ORAL EVERY 6 HOURS
Refills: 0 | Status: DISCONTINUED | OUTPATIENT
Start: 2021-05-20 | End: 2021-05-22

## 2021-05-20 RX ORDER — OXYCODONE AND ACETAMINOPHEN 5; 325 MG/1; MG/1
1 TABLET ORAL ONCE
Refills: 0 | Status: DISCONTINUED | OUTPATIENT
Start: 2021-05-20 | End: 2021-05-20

## 2021-05-20 RX ORDER — HYDROMORPHONE HYDROCHLORIDE 2 MG/ML
0.5 INJECTION INTRAMUSCULAR; INTRAVENOUS; SUBCUTANEOUS
Refills: 0 | Status: DISCONTINUED | OUTPATIENT
Start: 2021-05-20 | End: 2021-05-20

## 2021-05-20 RX ORDER — LISINOPRIL 2.5 MG/1
20 TABLET ORAL DAILY
Refills: 0 | Status: DISCONTINUED | OUTPATIENT
Start: 2021-05-20 | End: 2021-05-22

## 2021-05-20 RX ORDER — TRAZODONE HCL 50 MG
50 TABLET ORAL AT BEDTIME
Refills: 0 | Status: DISCONTINUED | OUTPATIENT
Start: 2021-05-20 | End: 2021-05-22

## 2021-05-20 RX ORDER — AMPICILLIN SODIUM AND SULBACTAM SODIUM 250; 125 MG/ML; MG/ML
1.5 INJECTION, POWDER, FOR SUSPENSION INTRAMUSCULAR; INTRAVENOUS EVERY 6 HOURS
Refills: 0 | Status: DISCONTINUED | OUTPATIENT
Start: 2021-05-20 | End: 2021-05-20

## 2021-05-20 RX ORDER — AMPICILLIN SODIUM AND SULBACTAM SODIUM 250; 125 MG/ML; MG/ML
INJECTION, POWDER, FOR SUSPENSION INTRAMUSCULAR; INTRAVENOUS
Refills: 0 | Status: DISCONTINUED | OUTPATIENT
Start: 2021-05-20 | End: 2021-05-20

## 2021-05-20 RX ADMIN — HEPARIN SODIUM 5000 UNIT(S): 5000 INJECTION INTRAVENOUS; SUBCUTANEOUS at 21:21

## 2021-05-20 RX ADMIN — AMPICILLIN SODIUM AND SULBACTAM SODIUM 100 GRAM(S): 250; 125 INJECTION, POWDER, FOR SUSPENSION INTRAMUSCULAR; INTRAVENOUS at 05:02

## 2021-05-20 RX ADMIN — SENNA PLUS 2 TABLET(S): 8.6 TABLET ORAL at 21:20

## 2021-05-20 RX ADMIN — PIPERACILLIN AND TAZOBACTAM 25 GRAM(S): 4; .5 INJECTION, POWDER, LYOPHILIZED, FOR SOLUTION INTRAVENOUS at 21:21

## 2021-05-20 RX ADMIN — LISINOPRIL 20 MILLIGRAM(S): 2.5 TABLET ORAL at 05:01

## 2021-05-20 RX ADMIN — HEPARIN SODIUM 5000 UNIT(S): 5000 INJECTION INTRAVENOUS; SUBCUTANEOUS at 13:47

## 2021-05-20 RX ADMIN — HEPARIN SODIUM 5000 UNIT(S): 5000 INJECTION INTRAVENOUS; SUBCUTANEOUS at 05:01

## 2021-05-20 RX ADMIN — Medication 400 MILLIGRAM(S): at 18:26

## 2021-05-20 RX ADMIN — Medication 50 GRAM(S): at 05:01

## 2021-05-20 RX ADMIN — Medication 650 MILLIGRAM(S): at 15:12

## 2021-05-20 RX ADMIN — AMPICILLIN SODIUM AND SULBACTAM SODIUM 100 GRAM(S): 250; 125 INJECTION, POWDER, FOR SUSPENSION INTRAMUSCULAR; INTRAVENOUS at 00:27

## 2021-05-20 RX ADMIN — Medication 650 MILLIGRAM(S): at 18:22

## 2021-05-20 RX ADMIN — PIPERACILLIN AND TAZOBACTAM 25 GRAM(S): 4; .5 INJECTION, POWDER, LYOPHILIZED, FOR SOLUTION INTRAVENOUS at 13:45

## 2021-05-20 NOTE — BRIEF OPERATIVE NOTE - NSEVIDNCEINFORABSCESSFT_GEN_ALL_CORE
extensive necrotizing soft tissue infection occupying more than 2/3rds of her breast, debrided to healthy tissue, packed with kerlex; appearnace suspicious for psuedomonas infection

## 2021-05-20 NOTE — CHART NOTE - NSCHARTNOTEFT_GEN_A_CORE
PACU ANESTHESIA ADMISSION NOTE      Procedure: Exploration, wound, chest  right    Incision and drainage, breast, with debridement      Post op diagnosis:  Necrotizing soft tissue infection        ____  Intubated  TV:______       Rate: ______      FiO2: ______    __X__  Patent Airway    __X__  Full return of protective reflexes    _X___  Full recovery from anesthesia / back to baseline status    Vitals:  T: 97.8F  HR: 107  BP: 99/51  RR: 15  SpO2: 99%    Mental Status:  __X__ Awake   _____ Alert   _____ Drowsy   _____ Sedated    Nausea/Vomiting:  __X__ NO  ______Yes,   See Post - Op Orders          Pain Scale (0-10):  _0____    Treatment: ____ None    __X__ See Post - Op/PCA Orders    Post - Operative Fluids:   ____ Oral   ___X_ See Post - Op Orders    Plan: Discharge:   ____Home       _X____Floor     _____Critical Care    _____  Other:_________________    Comments: NO anesthetic related complications noted. pt. transported to PACU, report endorsed to Rn

## 2021-05-20 NOTE — PROGRESS NOTE ADULT - ASSESSMENT
Assessment:  75y Female patient admitted HD 3 with necrotizing infection of right breat s/p core needle biopsy of inclusion cyst, scheduled for OR today for incision and drainage , with the above physical exam, labs, and imaging findings.    Plan:  - will follow post operatively  - appreciate ID recs: Unasyn  -Pain control as needed  -Hemodynamic monitoring as per routine  -Encourage ambulation and incentive spirometer use (10x/hr when awake)  -GI and DVT prophylaxis  -Check and replete CBC and BMP q daily  -Strict input and output monitoring  -Continue current management    Date/Time: 05-20-21 @ 07:16

## 2021-05-20 NOTE — CHART NOTE - NSCHARTNOTEFT_GEN_A_CORE
Post Operative Note  Patient: CASSIDY NOE 75y (1945) Female   MRN: [id number]  Location: [address]  Visit: 05-18-21 Inpatient  Date: 05-20-21 @ 15:45    Procedure: Necrotizing soft tissue infection     S/P Exploration, wound, chest Incision and drainage, breast, with debridement        Subjective: POD#0 s/p washout and debridement of right breast abscess. patient ate some regular food, has ambulated and urinated. pain is tolerable  Nausea: no, Vomiting: no, Ambulating: yes,   Pain Assessment: Patient is complaining of some pain at surgical site that is appropriate for post-operative course.     Objective:  Vitals: T(F): 97.1 (05-20-21 @ 10:19), Max: 99.9 (05-20-21 @ 01:00)  HR: 95 (05-20-21 @ 10:19)  BP: 136/61 (05-20-21 @ 10:19) (99/51 - 140/62)  RR: 18 (05-20-21 @ 10:19)  SpO2: 91% (05-20-21 @ 10:19)  Vent Settings:     In:   05-19-21 @ 07:01  -  05-20-21 @ 07:00  --------------------------------------------------------  IN: 1200 mL    05-20-21 @ 07:01  -  05-20-21 @ 15:45  --------------------------------------------------------  IN: 0 mL      IV Fluids:     Out:   05-19-21 @ 07:01  -  05-20-21 @ 07:00  --------------------------------------------------------  OUT: 1450 mL    05-20-21 @ 07:01  -  05-20-21 @ 15:45  --------------------------------------------------------  OUT: 600 mL      EBL:     Voided Urine:   05-19-21 @ 07:01  -  05-20-21 @ 07:00  --------------------------------------------------------  OUT: 1450 mL    05-20-21 @ 07:01  -  05-20-21 @ 15:45  --------------------------------------------------------  OUT: 600 mL      Degroot Catheter:  no   Drains: no  GELA: no     Physical Examination:  General Appearance: NAD  HEENT: EOMI, sclera non-icteric.  Heart: RRR  Lungs: CTABL  Abdomen:  Soft,, nondistended. No rigidity, guarding, or rebound tenderness.   MSK/Extremities: Warm & well-perfused. Peripheral pulses intact.  Skin: Warm, dry. No jaundice.   Incisions/Wounds: Dressings in place, clean, dry and intact, surgical bra on, will exam wound at 5pm    Medications: [Standing]  acetaminophen   Tablet .. 650 milliGRAM(s) Oral every 6 hours PRN  chlorhexidine 4% Liquid 1 Application(s) Topical <User Schedule>  chlorhexidine 4% Liquid 1 Application(s) Topical <User Schedule>  heparin   Injectable 5000 Unit(s) SubCutaneous every 8 hours  ibuprofen  Tablet. 400 milliGRAM(s) Oral every 6 hours PRN  lisinopril 20 milliGRAM(s) Oral daily  oxyCODONE    IR 5 milliGRAM(s) Oral every 6 hours PRN  pantoprazole    Tablet 40 milliGRAM(s) Oral before breakfast  piperacillin/tazobactam IVPB.. 3.375 Gram(s) IV Intermittent every 8 hours  senna 2 Tablet(s) Oral at bedtime  traZODone 50 milliGRAM(s) Oral at bedtime PRN    Medications: [PRN]  acetaminophen   Tablet .. 650 milliGRAM(s) Oral every 6 hours PRN  chlorhexidine 4% Liquid 1 Application(s) Topical <User Schedule>  chlorhexidine 4% Liquid 1 Application(s) Topical <User Schedule>  heparin   Injectable 5000 Unit(s) SubCutaneous every 8 hours  ibuprofen  Tablet. 400 milliGRAM(s) Oral every 6 hours PRN  lisinopril 20 milliGRAM(s) Oral daily  oxyCODONE    IR 5 milliGRAM(s) Oral every 6 hours PRN  pantoprazole    Tablet 40 milliGRAM(s) Oral before breakfast  piperacillin/tazobactam IVPB.. 3.375 Gram(s) IV Intermittent every 8 hours  senna 2 Tablet(s) Oral at bedtime  traZODone 50 milliGRAM(s) Oral at bedtime PRN    Labs:                        10.2   12.38 )-----------( 313      ( 19 May 2021 21:13 )             30.4     05-19    132<L>  |  101  |  22<H>  ----------------------------<  132<H>  3.7   |  20  |  0.7    Ca    7.8<L>      19 May 2021 21:13  Phos  2.8     05-19  Mg     1.7     05-19      PT/INR - ( 19 May 2021 21:13 )   PT: 14.20 sec;   INR: 1.23 ratio         PTT - ( 19 May 2021 21:13 )  PTT:24.4 sec  CARDIAC MARKERS ( 18 May 2021 18:19 )  x     / <0.01 ng/mL / 17 U/L / x     / 1.2 ng/mL        Imaging:  No post-op imaging studies        Plan:  - q8h dressing changes   - pain control  - Monitor vitals  - Monitor post-op labs and replete as necessary  - Monitor for bowel function  - Continue Antibiotics  - Encourage ambulation as tolerated  - DVT and GI Prophylaxis  - Monitor wound and dressing for changes, redress as needed.      Date/Time: 05-20-21 @ 15:45

## 2021-05-20 NOTE — CHART NOTE - NSCHARTNOTEFT_GEN_A_CORE
s/p OR extensive necrotizing infection  Changed unasyn to zosyn  f/u OR cx    Please call with any questions or send a message on [employer] Teams  Spectra [phone]

## 2021-05-20 NOTE — PROGRESS NOTE ADULT - SUBJECTIVE AND OBJECTIVE BOX
Progress Note: Surgery  Patient: CASSIDY NOE , 75y (1945)Female   MRN: [id number]  Location: [address]  Visit: 21 Inpatient  Date: 21 @ 07:16    Events over 24h: No acute event overnight. No new complaint. Pt is hemodynamically stable. NPO after midnight for OR today    Vitals: T(F): 99.4 (21 @ 06:23), Max: 99.9 (21 @ 01:00)  HR: 99 (21 @ 06:23)  BP: 139/62 (21 @ 06:23) (120/56 - 139/62)  RR: 17 (21 @ 06:23)  SpO2: 94% (21 @ 06:23)      Diet: Diet, NPO after Midnight:      NPO Start Date: 19-May-2021,   NPO Start Time: 23:59 (21 @ 16:23)  Diet, DASH/TLC:   Sodium & Cholesterol Restricted (21 @ 13:46)    IV Fluid: dextrose 5% + sodium chloride 0.45% with potassium chloride 20 mEq/L 1000 milliLiter(s) (100 mL/Hr) IV Continuous <Continuous>  potassium chloride  20 mEq/100 mL IVPB 20 milliEquivalent(s) IV Intermittent once  sodium phosphate IVPB 15 milliMole(s) IV Intermittent once      In:   21 @ 07:01  -  21 @ 07:00  --------------------------------------------------------  IN: 1200 mL      Out:   21 @ 07:01  -  21 @ 07:00  --------------------------------------------------------  OUT:    Voided (mL): 1450 mL  Total OUT: 1450 mL        Net:   21 @ 07:01  -  21 @ 07:00  --------------------------------------------------------  NET: -250 mL        Physical Examination:  General Appearance: NAD, alert and cooperative  Heart: S1 and S2. No murmurs. Rhythm is regular.  Lungs: Clear to auscultation BL without rales, rhonchi, wheezing, crackles or diminished breath sounds.  Incisions/Wounds: Dressings in place, slightly stained with serosang drainage, foul smelling necrotic edge of wound, attempted redebridement     Medications: [Standing]  ampicillin/sulbactam  IVPB      ampicillin/sulbactam  IVPB 1.5 Gram(s) IV Intermittent every 6 hours  chlorhexidine 4% Liquid 1 Application(s) Topical <User Schedule>  dextrose 5% + sodium chloride 0.45% with potassium chloride 20 mEq/L 1000 milliLiter(s) (100 mL/Hr) IV Continuous <Continuous>  heparin   Injectable 5000 Unit(s) SubCutaneous every 8 hours  lisinopril 20 milliGRAM(s) Oral daily  pantoprazole    Tablet 40 milliGRAM(s) Oral before breakfast  potassium chloride  20 mEq/100 mL IVPB 20 milliEquivalent(s) IV Intermittent once  senna 2 Tablet(s) Oral at bedtime  sodium phosphate IVPB 15 milliMole(s) IV Intermittent once    Medications:[PRN]  acetaminophen   Tablet .. 650 milliGRAM(s) Oral every 6 hours PRN  ibuprofen  Tablet. 400 milliGRAM(s) Oral every 6 hours PRN  oxyCODONE    IR 5 milliGRAM(s) Oral every 6 hours PRN  traZODone 50 milliGRAM(s) Oral at bedtime PRN    Labs:                        10.2   12.38 )-----------( 313      ( 19 May 2021 21:13 )             30.4       132<L>  |  101  |  22<H>  ----------------------------<  132<H>  3.7   |  20  |  0.7    Ca    7.8<L>      19 May 2021 21:13  Phos  2.8     -  Mg     1.7     -    TPro  5.6<L>  /  Alb  3.0<L>  /  TBili  0.5  /  DBili  x   /  AST  10  /  ALT  14  /  AlkPhos  122<H>    LIVER FUNCTIONS - ( 18 May 2021 10:30 )  Alb: 3.0 g/dL / Pro: 5.6 g/dL / ALK PHOS: 122 U/L / ALT: 14 U/L / AST: 10 U/L / GGT: x         PT/INR - ( 19 May 2021 21:13 )   PT: 14.20 sec;   INR: 1.23 ratio         PTT - ( 19 May 2021 21:13 )  PTT:24.4 secCARDIAC MARKERS ( 18 May 2021 18:19 )  x     / <0.01 ng/mL / 17 U/L / x     / 1.2 ng/mL  CARDIAC MARKERS ( 18 May 2021 11:30 )  x     / <0.01 ng/mL / x     / x     / x          Micro/Urine:  Urinalysis Basic - ( 18 May 2021 13:17 )    Color: Yellow / Appearance: Slightly Turbid / S.019 / pH: x  Gluc: x / Ketone: Negative  / Bili: Negative / Urobili: <2 mg/dL   Blood: x / Protein: 100 mg/dL / Nitrite: Negative   Leuk Esterase: Negative / RBC: 8 /HPF / WBC 16 /HPF   Sq Epi: x / Non Sq Epi: 16 /HPF / Bacteria: Few      Imaging:  None/24h

## 2021-05-20 NOTE — PROVIDER CONTACT NOTE (OTHER) - SITUATION
spoke with MD to inform that patient is supposed to be picked up for OR at 5:30-5:45, magnesium and potassium wont be able to be infused by then

## 2021-05-21 LAB
-  COAGULASE NEGATIVE STAPHYLOCOCCUS: SIGNIFICANT CHANGE UP
ANION GAP SERPL CALC-SCNC: 8 MMOL/L — SIGNIFICANT CHANGE UP (ref 7–14)
APTT BLD: 24.5 SEC — LOW (ref 27–39.2)
BASOPHILS # BLD AUTO: 0.02 K/UL — SIGNIFICANT CHANGE UP (ref 0–0.2)
BASOPHILS NFR BLD AUTO: 0.2 % — SIGNIFICANT CHANGE UP (ref 0–1)
BUN SERPL-MCNC: 26 MG/DL — HIGH (ref 10–20)
CALCIUM SERPL-MCNC: 7.7 MG/DL — LOW (ref 8.5–10.1)
CHLORIDE SERPL-SCNC: 106 MMOL/L — SIGNIFICANT CHANGE UP (ref 98–110)
CO2 SERPL-SCNC: 24 MMOL/L — SIGNIFICANT CHANGE UP (ref 17–32)
CREAT SERPL-MCNC: 0.8 MG/DL — SIGNIFICANT CHANGE UP (ref 0.7–1.5)
CULTURE RESULTS: SIGNIFICANT CHANGE UP
EOSINOPHIL # BLD AUTO: 0.06 K/UL — SIGNIFICANT CHANGE UP (ref 0–0.7)
EOSINOPHIL NFR BLD AUTO: 0.5 % — SIGNIFICANT CHANGE UP (ref 0–8)
ERYTHROCYTE [SEDIMENTATION RATE] IN BLOOD: 85 MM/HR — HIGH (ref 0–20)
GLUCOSE SERPL-MCNC: 147 MG/DL — HIGH (ref 70–99)
HCT VFR BLD CALC: 27.5 % — LOW (ref 37–47)
HGB BLD-MCNC: 9 G/DL — LOW (ref 12–16)
IMM GRANULOCYTES NFR BLD AUTO: 2.8 % — HIGH (ref 0.1–0.3)
INR BLD: 1.05 RATIO — SIGNIFICANT CHANGE UP (ref 0.65–1.3)
LYMPHOCYTES # BLD AUTO: 1.72 K/UL — SIGNIFICANT CHANGE UP (ref 1.2–3.4)
LYMPHOCYTES # BLD AUTO: 14.3 % — LOW (ref 20.5–51.1)
MAGNESIUM SERPL-MCNC: 1.5 MG/DL — LOW (ref 1.8–2.4)
MCHC RBC-ENTMCNC: 27.4 PG — SIGNIFICANT CHANGE UP (ref 27–31)
MCHC RBC-ENTMCNC: 32.7 G/DL — SIGNIFICANT CHANGE UP (ref 32–37)
MCV RBC AUTO: 83.8 FL — SIGNIFICANT CHANGE UP (ref 81–99)
METHOD TYPE: SIGNIFICANT CHANGE UP
MONOCYTES # BLD AUTO: 0.9 K/UL — HIGH (ref 0.1–0.6)
MONOCYTES NFR BLD AUTO: 7.5 % — SIGNIFICANT CHANGE UP (ref 1.7–9.3)
NEUTROPHILS # BLD AUTO: 9.01 K/UL — HIGH (ref 1.4–6.5)
NEUTROPHILS NFR BLD AUTO: 74.7 % — SIGNIFICANT CHANGE UP (ref 42.2–75.2)
NRBC # BLD: 0 /100 WBCS — SIGNIFICANT CHANGE UP (ref 0–0)
ORGANISM # SPEC MICROSCOPIC CNT: SIGNIFICANT CHANGE UP
ORGANISM # SPEC MICROSCOPIC CNT: SIGNIFICANT CHANGE UP
PHOSPHATE SERPL-MCNC: 3.3 MG/DL — SIGNIFICANT CHANGE UP (ref 2.1–4.9)
PLATELET # BLD AUTO: 377 K/UL — SIGNIFICANT CHANGE UP (ref 130–400)
POTASSIUM SERPL-MCNC: 4.1 MMOL/L — SIGNIFICANT CHANGE UP (ref 3.5–5)
POTASSIUM SERPL-SCNC: 4.1 MMOL/L — SIGNIFICANT CHANGE UP (ref 3.5–5)
PROTHROM AB SERPL-ACNC: 12.1 SEC — SIGNIFICANT CHANGE UP (ref 9.95–12.87)
RBC # BLD: 3.28 M/UL — LOW (ref 4.2–5.4)
RBC # FLD: 15 % — HIGH (ref 11.5–14.5)
SARS-COV-2 RNA SPEC QL NAA+PROBE: SIGNIFICANT CHANGE UP
SODIUM SERPL-SCNC: 138 MMOL/L — SIGNIFICANT CHANGE UP (ref 135–146)
SPECIMEN SOURCE: SIGNIFICANT CHANGE UP
WBC # BLD: 12.05 K/UL — HIGH (ref 4.8–10.8)
WBC # FLD AUTO: 12.05 K/UL — HIGH (ref 4.8–10.8)

## 2021-05-21 RX ORDER — MAGNESIUM SULFATE 500 MG/ML
2 VIAL (ML) INJECTION ONCE
Refills: 0 | Status: COMPLETED | OUTPATIENT
Start: 2021-05-21 | End: 2021-05-22

## 2021-05-21 RX ORDER — KETOROLAC TROMETHAMINE 30 MG/ML
15 SYRINGE (ML) INJECTION ONCE
Refills: 0 | Status: DISCONTINUED | OUTPATIENT
Start: 2021-05-21 | End: 2021-05-21

## 2021-05-21 RX ORDER — MAGNESIUM SULFATE 500 MG/ML
2 VIAL (ML) INJECTION ONCE
Refills: 0 | Status: COMPLETED | OUTPATIENT
Start: 2021-05-21 | End: 2021-05-21

## 2021-05-21 RX ORDER — SODIUM CHLORIDE 9 MG/ML
1000 INJECTION INTRAMUSCULAR; INTRAVENOUS; SUBCUTANEOUS
Refills: 0 | Status: DISCONTINUED | OUTPATIENT
Start: 2021-05-21 | End: 2021-05-22

## 2021-05-21 RX ADMIN — OXYCODONE HYDROCHLORIDE 5 MILLIGRAM(S): 5 TABLET ORAL at 23:17

## 2021-05-21 RX ADMIN — OXYCODONE HYDROCHLORIDE 5 MILLIGRAM(S): 5 TABLET ORAL at 23:45

## 2021-05-21 RX ADMIN — SENNA PLUS 2 TABLET(S): 8.6 TABLET ORAL at 21:12

## 2021-05-21 RX ADMIN — Medication 650 MILLIGRAM(S): at 13:26

## 2021-05-21 RX ADMIN — HEPARIN SODIUM 5000 UNIT(S): 5000 INJECTION INTRAVENOUS; SUBCUTANEOUS at 05:56

## 2021-05-21 RX ADMIN — PIPERACILLIN AND TAZOBACTAM 25 GRAM(S): 4; .5 INJECTION, POWDER, LYOPHILIZED, FOR SOLUTION INTRAVENOUS at 15:03

## 2021-05-21 RX ADMIN — LISINOPRIL 20 MILLIGRAM(S): 2.5 TABLET ORAL at 05:56

## 2021-05-21 RX ADMIN — Medication 400 MILLIGRAM(S): at 02:10

## 2021-05-21 RX ADMIN — HEPARIN SODIUM 5000 UNIT(S): 5000 INJECTION INTRAVENOUS; SUBCUTANEOUS at 15:03

## 2021-05-21 RX ADMIN — HEPARIN SODIUM 5000 UNIT(S): 5000 INJECTION INTRAVENOUS; SUBCUTANEOUS at 21:12

## 2021-05-21 RX ADMIN — Medication 15 MILLIGRAM(S): at 19:01

## 2021-05-21 RX ADMIN — Medication 650 MILLIGRAM(S): at 14:00

## 2021-05-21 RX ADMIN — PANTOPRAZOLE SODIUM 40 MILLIGRAM(S): 20 TABLET, DELAYED RELEASE ORAL at 05:56

## 2021-05-21 RX ADMIN — Medication 50 GRAM(S): at 05:57

## 2021-05-21 RX ADMIN — PIPERACILLIN AND TAZOBACTAM 25 GRAM(S): 4; .5 INJECTION, POWDER, LYOPHILIZED, FOR SOLUTION INTRAVENOUS at 21:12

## 2021-05-21 RX ADMIN — PIPERACILLIN AND TAZOBACTAM 25 GRAM(S): 4; .5 INJECTION, POWDER, LYOPHILIZED, FOR SOLUTION INTRAVENOUS at 05:58

## 2021-05-21 NOTE — PROGRESS NOTE ADULT - SUBJECTIVE AND OBJECTIVE BOX
Progress Note: Surgery  Patient: CASSIDY NOE , 75y (1945)Female   MRN: [id number]  Location: [address]  Visit: 05-18-21 Inpatient  Date: 05-21-21 @ 08:46    Procedure/Diagnosis: POD 1 s/p debridement of right breast  Events over 24h: No acute event overnight. No new complaint. Pt is hemodynamically stable. On diet, tolerating well. Denies nausea, vomiting. Ambulating adequately, Voiding adequately.     Vitals: T(F): 96.6 (05-21-21 @ 04:55), Max: 97.9 (05-20-21 @ 09:00)  HR: 83 (05-21-21 @ 04:55)  BP: 162/74 (05-21-21 @ 04:55) (99/51 - 162/74)  RR: 18 (05-21-21 @ 04:55)  SpO2: 91% (05-20-21 @ 10:19)      Diet: Diet, DASH/TLC:   Sodium & Cholesterol Restricted (05-20-21 @ 09:30)    IV Fluid:     In:   05-20-21 @ 07:01  -  05-21-21 @ 07:00  --------------------------------------------------------  IN: 0 mL      Out:   05-20-21 @ 07:01  -  05-21-21 @ 07:00  --------------------------------------------------------  OUT:    Voided (mL): 600 mL  Total OUT: 600 mL        Net:   05-20-21 @ 07:01  -  05-21-21 @ 07:00  --------------------------------------------------------  NET: -600 mL    Physical Examination:  General Appearance: NAD, alert and cooperative  HEENT: NCAT, WNL  Heart: S1 and S2. No murmurs. Rhythm is regular.  Lungs: Clear to auscultation BL without rales, rhonchi, wheezing, crackles or diminished breath sounds.  Incisions/Wounds: Dressings in place, clean, dry and intact, wound bed with pink tissue, superior flap with areas of focal necrosis    Medications: [Standing]  chlorhexidine 4% Liquid 1 Application(s) Topical <User Schedule>  chlorhexidine 4% Liquid 1 Application(s) Topical <User Schedule>  heparin   Injectable 5000 Unit(s) SubCutaneous every 8 hours  lisinopril 20 milliGRAM(s) Oral daily  pantoprazole    Tablet 40 milliGRAM(s) Oral before breakfast  piperacillin/tazobactam IVPB.. 3.375 Gram(s) IV Intermittent every 8 hours  senna 2 Tablet(s) Oral at bedtime    Medications:[PRN]  acetaminophen   Tablet .. 650 milliGRAM(s) Oral every 6 hours PRN  ibuprofen  Tablet. 400 milliGRAM(s) Oral every 6 hours PRN  oxyCODONE    IR 5 milliGRAM(s) Oral every 6 hours PRN  traZODone 50 milliGRAM(s) Oral at bedtime PRN    Labs:                        8.8    16.60 )-----------( 334      ( 20 May 2021 20:00 )             27.1   05-20    134<L>  |  103  |  19  ----------------------------<  241<H>  4.4   |  18  |  0.8    Ca    7.6<L>      20 May 2021 20:00  Phos  3.2     05-20  Mg     1.7     05-20    PT/INR - ( 19 May 2021 21:13 )   PT: 14.20 sec;   INR: 1.23 ratio         PTT - ( 19 May 2021 21:13 )  PTT:24.4 sec    Micro/Urine:    Culture - Tissue with Gram Stain (collected 05-20-21 @ 10:44)  Source: .Tissue None  Gram Stain (05-20-21 @ 21:54):    Rare polymorphonuclear leukocytes seen per low power field    Rare Gram Variable Rods seen per oil power field    Rare Gram positive cocci in pairs seen per oil power field    Imaging:  None/24h

## 2021-05-21 NOTE — PROGRESS NOTE ADULT - SUBJECTIVE AND OBJECTIVE BOX
CASSIDY NOE  75y, Female  Allergy: No Known Allergies      LOS  3d    CHIEF COMPLAINT: right breast abscess (20 May 2021 07:15)      INTERVAL EVENTS/HPI  - No acute events overnight, s/p OR   - T(F): , Max: 97.9 (05-20-21 @ 09:00)  - Tolerating medication  - WBC Count: 16.60 (05-20-21 @ 20:00)  WBC Count: 12.38 (05-19-21 @ 21:13)     - Creatinine, Serum: 0.8 (05-20-21 @ 20:00)  Creatinine, Serum: 0.7 (05-19-21 @ 21:13)       ROS  ***    VITALS:  T(F): 96.6, Max: 97.9 (05-20-21 @ 09:00)  HR: 83  BP: 162/74  RR: 18Vital Signs Last 24 Hrs  T(C): 35.9 (21 May 2021 04:55), Max: 36.6 (20 May 2021 09:00)  T(F): 96.6 (21 May 2021 04:55), Max: 97.9 (20 May 2021 09:00)  HR: 83 (21 May 2021 04:55) (78 - 114)  BP: 162/74 (21 May 2021 04:55) (99/51 - 162/74)  BP(mean): --  RR: 18 (21 May 2021 04:55) (16 - 20)  SpO2: 91% (20 May 2021 10:19) (91% - 95%)    PHYSICAL EXAM:  ***    FH: Non-contributory  Social Hx: Non-contributory    TESTS & MEASUREMENTS:                        8.8    16.60 )-----------( 334      ( 20 May 2021 20:00 )             27.1     05-20    134<L>  |  103  |  19  ----------------------------<  241<H>  4.4   |  18  |  0.8    Ca    7.6<L>      20 May 2021 20:00  Phos  3.2     05-20  Mg     1.7     05-20      eGFR if African American: 82 mL/min/1.73M2 (05-20-21 @ 20:00)  eGFR if Non African American: 71 mL/min/1.73M2 (05-20-21 @ 20:00)          Culture - Tissue with Gram Stain (collected 05-20-21 @ 10:44)  Source: .Tissue None  Gram Stain (05-20-21 @ 21:54):    Rare polymorphonuclear leukocytes seen per low power field    Rare Gram Variable Rods seen per oil power field    Rare Gram positive cocci in pairs seen per oil power field    Culture - Urine (collected 05-18-21 @ 13:17)  Source: .Urine Clean Catch (Midstream)  Preliminary Report (05-19-21 @ 18:40):    10,000 - 49,000 CFU/mL Gram Negative Rods    <10,000 CFU/ml Normal Urogenital johanna present    Culture - Blood (collected 05-18-21 @ 11:00)  Source: .Blood Blood-Peripheral  Gram Stain (05-20-21 @ 21:44):    Growth in anaerobic bottle: Gram Positive Cocci in Clusters  Preliminary Report (05-20-21 @ 21:44):    Growth in anaerobic bottle: Gram Positive Cocci in Clusters    ***Blood Panel PCR results on this specimen are available    approximately 3 hours after the Gram stain result.***    Gram stain, PCR, and/or culture results may not always    correspond due todifference in methodologies.    ************************************************************    This PCR assay was performed by multiplex PCR. This    Assay tests for 66 bacterial and resistance gene targets.    Please refer to the [facility] [employer] testdirectory    at [email or web address] for details.  Organism: Blood Culture PCR (05-21-21 @ 01:21)  Organism: Blood Culture PCR (05-21-21 @ 01:21)      -  Coagulase negative Staphylococcus: Detec      Method Type: PCR    Culture - Blood (collected 05-18-21 @ 11:00)  Source: .Blood Blood-Peripheral  Preliminary Report (05-19-21 @ 23:04):    No growth to date.        Lactate, Blood: 1.5 mmol/L (05-18-21 @ 22:00)  Lactate, Blood: 1.9 mmol/L (05-18-21 @ 18:19)  Lactate, Blood: 1.3 mmol/L (05-18-21 @ 12:07)  Lactate, Blood: 2.6 mmol/L (05-18-21 @ 10:30)      INFECTIOUS DISEASES TESTING  MRSA PCR Result.: Negative (05-19-21 @ 10:40)  COVID-19 PCR: NotDetec (05-18-21 @ 10:14)      INFLAMMATORY MARKERS      RADIOLOGY & ADDITIONAL TESTS:  I have personally reviewed the last available Chest xray  CXR  Xray Chest 1 View- PORTABLE-Urgent:   EXAM:  XR CHEST PORTABLE URGENT 1V            PROCEDURE DATE:  05/18/2021            INTERPRETATION:  Clinical History / Reason for exam: Sepsis.    Comparison : Chest radiograph None.    Technique/Positioning: Frontal view time 10:54 AM.    Findings:    The heart size is within normal limits.    Calcification within the thoracic aorta.    Left basilar opacity.    Diffuse osteopenia.    Impression:    Left basilar opacity.              FIDENCIO BERNABE MD; Attending Radiologist  This document has been electronically signed. May 18 2021  2:55PM (05-18-21 @ 11:10)      CT      CARDIOLOGY TESTING  12 Lead ECG:   Ventricular Rate 89 BPM    Atrial Rate 89 BPM    P-R Interval 150 ms    QRS Duration 90 ms    Q-T Interval 366 ms    QTC Calculation(Bazett) 445 ms    P Axis 75 degrees    R Axis 78 degrees    T Axis 70 degrees    Diagnosis Line Normal sinus rhythm  Normal ECG    Confirmed by KENDRA HOLT MD ([id number]) on 5/18/2021 3:22:53 PM (05-18-21 @ 14:27)  12 Lead ECG:   Ventricular Rate 92 BPM    Atrial Rate 92 BPM    P-R Interval 142 ms    QRS Duration 78 ms    Q-T Interval 358 ms    QTC Calculation(Bazett) 442 ms    P Axis 75 degrees    R Axis 65 degrees    T Axis 67 degrees    Diagnosis Line Sinus rhythm with Premature atrial complexes  ST elevation, consider early repolarization  Borderline ECG    Confirmed by KENDRA HOLT MD ([id number]) on 5/18/2021 12:58:02 PM (05-18-21 @ 11:16)      MEDICATIONS  chlorhexidine 4% Liquid 1 Topical <User Schedule>  chlorhexidine 4% Liquid 1 Topical <User Schedule>  heparin   Injectable 5000 SubCutaneous every 8 hours  lisinopril 20 Oral daily  pantoprazole    Tablet 40 Oral before breakfast  piperacillin/tazobactam IVPB.. 3.375 IV Intermittent every 8 hours  senna 2 Oral at bedtime      WEIGHT  Weight (kg): 63.3 (05-20-21 @ 00:25)  Creatinine, Serum: 0.8 mg/dL (05-20-21 @ 20:00)      ANTIBIOTICS:  piperacillin/tazobactam IVPB.. 3.375 Gram(s) IV Intermittent every 8 hours      All available historical records have been reviewed       CASSIDY NOE  75y, Female  Allergy: No Known Allergies      LOS  3d    CHIEF COMPLAINT: right breast abscess (20 May 2021 07:15)      INTERVAL EVENTS/HPI  - No acute events overnight, s/p OR   - T(F): , Max: 97.9 (05-20-21 @ 09:00)  - Tolerating medication  - WBC Count: 16.60 (05-20-21 @ 20:00)  WBC Count: 12.38 (05-19-21 @ 21:13)     - Creatinine, Serum: 0.8 (05-20-21 @ 20:00)  Creatinine, Serum: 0.7 (05-19-21 @ 21:13)       ROS  General: Denies rigors, nightsweats  HEENT: Denies headache, rhinorrhea, sore throat, eye pain  CV: Denies CP, palpitations  PULM: Denies wheezing, hemoptysis  GI: Denies hematemesis, hematochezia, melena  : Denies discharge, hematuria  MSK: Denies arthralgias, myalgias  SKIN: Denies rash, lesions  NEURO: Denies paresthesias, weakness  PSYCH: Denies depression, anxiety     VITALS:  T(F): 96.6, Max: 97.9 (05-20-21 @ 09:00)  HR: 83  BP: 162/74  RR: 18Vital Signs Last 24 Hrs  T(C): 35.9 (21 May 2021 04:55), Max: 36.6 (20 May 2021 09:00)  T(F): 96.6 (21 May 2021 04:55), Max: 97.9 (20 May 2021 09:00)  HR: 83 (21 May 2021 04:55) (78 - 114)  BP: 162/74 (21 May 2021 04:55) (99/51 - 162/74)  BP(mean): --  RR: 18 (21 May 2021 04:55) (16 - 20)  SpO2: 91% (20 May 2021 10:19) (91% - 95%)    PHYSICAL EXAM:  Gen: NAD, resting in bed  HEENT: Normocephalic, atraumatic  Neck: supple, no lymphadenopathy  CV: Regular rate & regular rhythm  Lungs: decreased BS at bases, no fremitus  Abdomen: Soft, BS present  Breast dressings  Ext: Warm, well perfused  Neuro: non focal, awake  Skin: no rash, no erythema  Lines: no phlebitis     FH: Non-contributory  Social Hx: Non-contributory    TESTS & MEASUREMENTS:                        8.8    16.60 )-----------( 334      ( 20 May 2021 20:00 )             27.1     05-20    134<L>  |  103  |  19  ----------------------------<  241<H>  4.4   |  18  |  0.8    Ca    7.6<L>      20 May 2021 20:00  Phos  3.2     05-20  Mg     1.7     05-20      eGFR if African American: 82 mL/min/1.73M2 (05-20-21 @ 20:00)  eGFR if Non African American: 71 mL/min/1.73M2 (05-20-21 @ 20:00)          Culture - Tissue with Gram Stain (collected 05-20-21 @ 10:44)  Source: .Tissue None  Gram Stain (05-20-21 @ 21:54):    Rare polymorphonuclear leukocytes seen per low power field    Rare Gram Variable Rods seen per oil power field    Rare Gram positive cocci in pairs seen per oil power field    Culture - Urine (collected 05-18-21 @ 13:17)  Source: .Urine Clean Catch (Midstream)  Preliminary Report (05-19-21 @ 18:40):    10,000 - 49,000 CFU/mL Gram Negative Rods    <10,000 CFU/ml Normal Urogenital johanna present    Culture - Blood (collected 05-18-21 @ 11:00)  Source: .Blood Blood-Peripheral  Gram Stain (05-20-21 @ 21:44):    Growth in anaerobic bottle: Gram Positive Cocci in Clusters  Preliminary Report (05-20-21 @ 21:44):    Growth in anaerobic bottle: Gram Positive Cocci in Clusters    ***Blood Panel PCR results on this specimen are available    approximately 3 hours after the Gram stain result.***    Gram stain, PCR, and/or culture results may not always    correspond due todifference in methodologies.    ************************************************************    This PCR assay was performed by multiplex PCR. This    Assay tests for 66 bacterial and resistance gene targets.    Please refer to the [facility] [employer] testdirectory    at [email or web address] for details.  Organism: Blood Culture PCR (05-21-21 @ 01:21)  Organism: Blood Culture PCR (05-21-21 @ 01:21)      -  Coagulase negative Staphylococcus: Detec      Method Type: PCR    Culture - Blood (collected 05-18-21 @ 11:00)  Source: .Blood Blood-Peripheral  Preliminary Report (05-19-21 @ 23:04):    No growth to date.        Lactate, Blood: 1.5 mmol/L (05-18-21 @ 22:00)  Lactate, Blood: 1.9 mmol/L (05-18-21 @ 18:19)  Lactate, Blood: 1.3 mmol/L (05-18-21 @ 12:07)  Lactate, Blood: 2.6 mmol/L (05-18-21 @ 10:30)      INFECTIOUS DISEASES TESTING  MRSA PCR Result.: Negative (05-19-21 @ 10:40)  COVID-19 PCR: NotDetec (05-18-21 @ 10:14)      INFLAMMATORY MARKERS      RADIOLOGY & ADDITIONAL TESTS:  I have personally reviewed the last available Chest xray  CXR  Xray Chest 1 View- PORTABLE-Urgent:   EXAM:  XR CHEST PORTABLE URGENT 1V            PROCEDURE DATE:  05/18/2021            INTERPRETATION:  Clinical History / Reason for exam: Sepsis.    Comparison : Chest radiograph None.    Technique/Positioning: Frontal view time 10:54 AM.    Findings:    The heart size is within normal limits.    Calcification within the thoracic aorta.    Left basilar opacity.    Diffuse osteopenia.    Impression:    Left basilar opacity.              FIDENCIO BERNABE MD; Attending Radiologist  This document has been electronically signed. May 18 2021  2:55PM (05-18-21 @ 11:10)      CT      CARDIOLOGY TESTING  12 Lead ECG:   Ventricular Rate 89 BPM    Atrial Rate 89 BPM    P-R Interval 150 ms    QRS Duration 90 ms    Q-T Interval 366 ms    QTC Calculation(Bazett) 445 ms    P Axis 75 degrees    R Axis 78 degrees    T Axis 70 degrees    Diagnosis Line Normal sinus rhythm  Normal ECG    Confirmed by KENDRA HOLT MD ([id number]) on 5/18/2021 3:22:53 PM (05-18-21 @ 14:27)  12 Lead ECG:   Ventricular Rate 92 BPM    Atrial Rate 92 BPM    P-R Interval 142 ms    QRS Duration 78 ms    Q-T Interval 358 ms    QTC Calculation(Bazett) 442 ms    P Axis 75 degrees    R Axis 65 degrees    T Axis 67 degrees    Diagnosis Line Sinus rhythm with Premature atrial complexes  ST elevation, consider early repolarization  Borderline ECG    Confirmed by KENDRA HOLT MD ([id number]) on 5/18/2021 12:58:02 PM (05-18-21 @ 11:16)      MEDICATIONS  chlorhexidine 4% Liquid 1 Topical <User Schedule>  chlorhexidine 4% Liquid 1 Topical <User Schedule>  heparin   Injectable 5000 SubCutaneous every 8 hours  lisinopril 20 Oral daily  pantoprazole    Tablet 40 Oral before breakfast  piperacillin/tazobactam IVPB.. 3.375 IV Intermittent every 8 hours  senna 2 Oral at bedtime      WEIGHT  Weight (kg): 63.3 (05-20-21 @ 00:25)  Creatinine, Serum: 0.8 mg/dL (05-20-21 @ 20:00)      ANTIBIOTICS:  piperacillin/tazobactam IVPB.. 3.375 Gram(s) IV Intermittent every 8 hours      All available historical records have been reviewed

## 2021-05-21 NOTE — PROGRESS NOTE ADULT - ASSESSMENT
Assessment:  75y Female patient admitted POD 1 s/p debridement of necrotic right breast wound, doing well, dressing changed q8 hours, with the above physical exam, labs, and imaging findings.    Plan:  - c/w q8h dressing changes with kerlex packing  - c/w zosyn, f/u with ID regarding antibiotic choice  - pt-rehab  - will add on to OR schedule tomorrow for repeat debridement  -Pain control as needed  -Hemodynamic monitoring as per routine  -Encourage ambulation and incentive spirometer use (10x/hr when awake)  -GI and DVT prophylaxis  -Check and replete CBC and BMP q daily  -Strict input and output monitoring  -Continue current management    Date/Time: 05-21-21 @ 08:46

## 2021-05-21 NOTE — PROGRESS NOTE ADULT - ASSESSMENT
ASSESSMENT  75F w/ PMH of HTN and b/l lumpectomies (10-15 years ago, reportedly benign pathology) presented to the ED with worsening R breast pain. Pt reports R breast biopsy last week Tuesday, 5/11. She was told to keep the dressing on for 5 days. Since the biopsy, she has been experiencing R breast pain. 2 days ago, she removed the dressing and saw erythema and small amount of necrotic tissue overlying R underside of breast. This morning the abscess ruptured and began putting out pus and necrotic tissue.    IMPRESSION  #Severe Sepsis on admission lactic acidosis due to necrotic R breast abscess    s/p OR WCX   Rare Gram Variable Rods seen per oil power field    Rare Gram positive cocci in pairs seen per oil power field    s/p Exploration, wound, chest 20-May-2021 08:50:47 right Michelle Wagner  Incision and drainage, breast, with debridement 20-May-2021 "extensive necrotizing soft tissue infection occupying more than 2/3rds of her breast, debrided to healthy tissue, packed with kerlex"    UCX GNR    s/p I&D exudate, purulent drainage, necrotic tissue    MRSA PCR Result.: Negative (05-19-21 @ 10:40)  #CoNS in bcx is a contaminant 1/4 bottles    5/18 1/4 bottles coNS  #Hyponatremia  Creatinine, Serum: 0.8 (05-19-21 @ 05:34)    Weight (kg): 63.3 (05-18-21 @ 16:27)    RECOMMENDATIONS  - zosyn 3.375 Gram(s) IV Intermittent every 8 hours  - Repeat BCX, coNS in bcx is likely a contaminant   - f/u OR cx speciation  - Send ESR, CRP      If any questions, please call or send a message on [employer] Teams  Spectra [phone]

## 2021-05-22 LAB
-  AMIKACIN: SIGNIFICANT CHANGE UP
-  AMOXICILLIN/CLAVULANIC ACID: SIGNIFICANT CHANGE UP
-  AMPICILLIN/SULBACTAM: SIGNIFICANT CHANGE UP
-  AMPICILLIN: SIGNIFICANT CHANGE UP
-  AZTREONAM: SIGNIFICANT CHANGE UP
-  CEFAZOLIN: SIGNIFICANT CHANGE UP
-  CEFEPIME: SIGNIFICANT CHANGE UP
-  CEFOXITIN: SIGNIFICANT CHANGE UP
-  CEFTRIAXONE: SIGNIFICANT CHANGE UP
-  CIPROFLOXACIN: SIGNIFICANT CHANGE UP
-  ERTAPENEM: SIGNIFICANT CHANGE UP
-  GENTAMICIN: SIGNIFICANT CHANGE UP
-  IMIPENEM: SIGNIFICANT CHANGE UP
-  LEVOFLOXACIN: SIGNIFICANT CHANGE UP
-  MEROPENEM: SIGNIFICANT CHANGE UP
-  NITROFURANTOIN: SIGNIFICANT CHANGE UP
-  PIPERACILLIN/TAZOBACTAM: SIGNIFICANT CHANGE UP
-  TIGECYCLINE: SIGNIFICANT CHANGE UP
-  TOBRAMYCIN: SIGNIFICANT CHANGE UP
-  TRIMETHOPRIM/SULFAMETHOXAZOLE: SIGNIFICANT CHANGE UP
ANION GAP SERPL CALC-SCNC: 9 MMOL/L — SIGNIFICANT CHANGE UP (ref 7–14)
BASOPHILS # BLD AUTO: 0.03 K/UL — SIGNIFICANT CHANGE UP (ref 0–0.2)
BASOPHILS NFR BLD AUTO: 0.2 % — SIGNIFICANT CHANGE UP (ref 0–1)
BUN SERPL-MCNC: 23 MG/DL — HIGH (ref 10–20)
CALCIUM SERPL-MCNC: 7.2 MG/DL — LOW (ref 8.5–10.1)
CHLORIDE SERPL-SCNC: 103 MMOL/L — SIGNIFICANT CHANGE UP (ref 98–110)
CO2 SERPL-SCNC: 22 MMOL/L — SIGNIFICANT CHANGE UP (ref 17–32)
CREAT SERPL-MCNC: 0.8 MG/DL — SIGNIFICANT CHANGE UP (ref 0.7–1.5)
CRP SERPL-MCNC: 80 MG/L — HIGH
CULTURE RESULTS: SIGNIFICANT CHANGE UP
EOSINOPHIL # BLD AUTO: 0.02 K/UL — SIGNIFICANT CHANGE UP (ref 0–0.7)
EOSINOPHIL NFR BLD AUTO: 0.1 % — SIGNIFICANT CHANGE UP (ref 0–8)
GLUCOSE BLDC GLUCOMTR-MCNC: 178 MG/DL — HIGH (ref 70–99)
GLUCOSE BLDC GLUCOMTR-MCNC: 182 MG/DL — HIGH (ref 70–99)
GLUCOSE SERPL-MCNC: 181 MG/DL — HIGH (ref 70–99)
HCT VFR BLD CALC: 27.7 % — LOW (ref 37–47)
HGB BLD-MCNC: 8.8 G/DL — LOW (ref 12–16)
IMM GRANULOCYTES NFR BLD AUTO: 2.3 % — HIGH (ref 0.1–0.3)
LYMPHOCYTES # BLD AUTO: 1.38 K/UL — SIGNIFICANT CHANGE UP (ref 1.2–3.4)
LYMPHOCYTES # BLD AUTO: 9.1 % — LOW (ref 20.5–51.1)
MAGNESIUM SERPL-MCNC: 1.4 MG/DL — LOW (ref 1.8–2.4)
MCHC RBC-ENTMCNC: 27.3 PG — SIGNIFICANT CHANGE UP (ref 27–31)
MCHC RBC-ENTMCNC: 31.8 G/DL — LOW (ref 32–37)
MCV RBC AUTO: 86 FL — SIGNIFICANT CHANGE UP (ref 81–99)
METHOD TYPE: SIGNIFICANT CHANGE UP
MONOCYTES # BLD AUTO: 0.97 K/UL — HIGH (ref 0.1–0.6)
MONOCYTES NFR BLD AUTO: 6.4 % — SIGNIFICANT CHANGE UP (ref 1.7–9.3)
NEUTROPHILS # BLD AUTO: 12.38 K/UL — HIGH (ref 1.4–6.5)
NEUTROPHILS NFR BLD AUTO: 81.9 % — HIGH (ref 42.2–75.2)
NRBC # BLD: 0 /100 WBCS — SIGNIFICANT CHANGE UP (ref 0–0)
ORGANISM # SPEC MICROSCOPIC CNT: SIGNIFICANT CHANGE UP
ORGANISM # SPEC MICROSCOPIC CNT: SIGNIFICANT CHANGE UP
PHOSPHATE SERPL-MCNC: 3.2 MG/DL — SIGNIFICANT CHANGE UP (ref 2.1–4.9)
PLATELET # BLD AUTO: 366 K/UL — SIGNIFICANT CHANGE UP (ref 130–400)
POTASSIUM SERPL-MCNC: 4.4 MMOL/L — SIGNIFICANT CHANGE UP (ref 3.5–5)
POTASSIUM SERPL-SCNC: 4.4 MMOL/L — SIGNIFICANT CHANGE UP (ref 3.5–5)
RBC # BLD: 3.22 M/UL — LOW (ref 4.2–5.4)
RBC # FLD: 15.1 % — HIGH (ref 11.5–14.5)
SODIUM SERPL-SCNC: 134 MMOL/L — LOW (ref 135–146)
SPECIMEN SOURCE: SIGNIFICANT CHANGE UP
WBC # BLD: 15.13 K/UL — HIGH (ref 4.8–10.8)
WBC # FLD AUTO: 15.13 K/UL — HIGH (ref 4.8–10.8)

## 2021-05-22 PROCEDURE — 11042 DBRDMT SUBQ TIS 1ST 20SQCM/<: CPT

## 2021-05-22 PROCEDURE — 97597 DBRDMT OPN WND 1ST 20 CM/<: CPT | Mod: 59

## 2021-05-22 RX ORDER — TRAZODONE HCL 50 MG
50 TABLET ORAL AT BEDTIME
Refills: 0 | Status: DISCONTINUED | OUTPATIENT
Start: 2021-05-22 | End: 2021-05-24

## 2021-05-22 RX ORDER — HEPARIN SODIUM 5000 [USP'U]/ML
5000 INJECTION INTRAVENOUS; SUBCUTANEOUS EVERY 8 HOURS
Refills: 0 | Status: DISCONTINUED | OUTPATIENT
Start: 2021-05-22 | End: 2021-05-24

## 2021-05-22 RX ORDER — ACETAMINOPHEN 500 MG
1000 TABLET ORAL ONCE
Refills: 0 | Status: COMPLETED | OUTPATIENT
Start: 2021-05-22 | End: 2021-05-22

## 2021-05-22 RX ORDER — PIPERACILLIN AND TAZOBACTAM 4; .5 G/20ML; G/20ML
3.38 INJECTION, POWDER, LYOPHILIZED, FOR SOLUTION INTRAVENOUS EVERY 8 HOURS
Refills: 0 | Status: DISCONTINUED | OUTPATIENT
Start: 2021-05-22 | End: 2021-05-24

## 2021-05-22 RX ORDER — DRONABINOL 2.5 MG
2.5 CAPSULE ORAL
Refills: 0 | Status: DISCONTINUED | OUTPATIENT
Start: 2021-05-22 | End: 2021-05-24

## 2021-05-22 RX ORDER — ACETAMINOPHEN 500 MG
650 TABLET ORAL EVERY 6 HOURS
Refills: 0 | Status: DISCONTINUED | OUTPATIENT
Start: 2021-05-22 | End: 2021-05-24

## 2021-05-22 RX ORDER — SENNA PLUS 8.6 MG/1
2 TABLET ORAL AT BEDTIME
Refills: 0 | Status: DISCONTINUED | OUTPATIENT
Start: 2021-05-22 | End: 2021-05-24

## 2021-05-22 RX ORDER — CHLORHEXIDINE GLUCONATE 213 G/1000ML
1 SOLUTION TOPICAL
Refills: 0 | Status: DISCONTINUED | OUTPATIENT
Start: 2021-05-22 | End: 2021-05-24

## 2021-05-22 RX ORDER — OXYCODONE HYDROCHLORIDE 5 MG/1
5 TABLET ORAL EVERY 6 HOURS
Refills: 0 | Status: DISCONTINUED | OUTPATIENT
Start: 2021-05-22 | End: 2021-05-24

## 2021-05-22 RX ORDER — KETOROLAC TROMETHAMINE 30 MG/ML
30 SYRINGE (ML) INJECTION ONCE
Refills: 0 | Status: DISCONTINUED | OUTPATIENT
Start: 2021-05-22 | End: 2021-05-22

## 2021-05-22 RX ORDER — HYDROMORPHONE HYDROCHLORIDE 2 MG/ML
0.25 INJECTION INTRAMUSCULAR; INTRAVENOUS; SUBCUTANEOUS ONCE
Refills: 0 | Status: DISCONTINUED | OUTPATIENT
Start: 2021-05-22 | End: 2021-05-22

## 2021-05-22 RX ORDER — LISINOPRIL 2.5 MG/1
20 TABLET ORAL DAILY
Refills: 0 | Status: DISCONTINUED | OUTPATIENT
Start: 2021-05-22 | End: 2021-05-24

## 2021-05-22 RX ORDER — MORPHINE SULFATE 50 MG/1
1 CAPSULE, EXTENDED RELEASE ORAL
Refills: 0 | Status: DISCONTINUED | OUTPATIENT
Start: 2021-05-22 | End: 2021-05-22

## 2021-05-22 RX ADMIN — LISINOPRIL 20 MILLIGRAM(S): 2.5 TABLET ORAL at 05:08

## 2021-05-22 RX ADMIN — Medication 16.67 GRAM(S): at 00:25

## 2021-05-22 RX ADMIN — Medication 400 MILLIGRAM(S): at 09:15

## 2021-05-22 RX ADMIN — OXYCODONE HYDROCHLORIDE 5 MILLIGRAM(S): 5 TABLET ORAL at 21:14

## 2021-05-22 RX ADMIN — OXYCODONE HYDROCHLORIDE 5 MILLIGRAM(S): 5 TABLET ORAL at 21:46

## 2021-05-22 RX ADMIN — SENNA PLUS 2 TABLET(S): 8.6 TABLET ORAL at 21:15

## 2021-05-22 RX ADMIN — Medication 2.5 MILLIGRAM(S): at 16:57

## 2021-05-22 RX ADMIN — Medication 650 MILLIGRAM(S): at 16:58

## 2021-05-22 RX ADMIN — Medication 30 MILLIGRAM(S): at 09:20

## 2021-05-22 RX ADMIN — PIPERACILLIN AND TAZOBACTAM 25 GRAM(S): 4; .5 INJECTION, POWDER, LYOPHILIZED, FOR SOLUTION INTRAVENOUS at 21:14

## 2021-05-22 RX ADMIN — Medication 650 MILLIGRAM(S): at 11:30

## 2021-05-22 RX ADMIN — PIPERACILLIN AND TAZOBACTAM 25 GRAM(S): 4; .5 INJECTION, POWDER, LYOPHILIZED, FOR SOLUTION INTRAVENOUS at 14:23

## 2021-05-22 RX ADMIN — SODIUM CHLORIDE 100 MILLILITER(S): 9 INJECTION INTRAMUSCULAR; INTRAVENOUS; SUBCUTANEOUS at 00:27

## 2021-05-22 RX ADMIN — PIPERACILLIN AND TAZOBACTAM 25 GRAM(S): 4; .5 INJECTION, POWDER, LYOPHILIZED, FOR SOLUTION INTRAVENOUS at 05:08

## 2021-05-22 RX ADMIN — PANTOPRAZOLE SODIUM 40 MILLIGRAM(S): 20 TABLET, DELAYED RELEASE ORAL at 05:09

## 2021-05-22 RX ADMIN — MORPHINE SULFATE 1 MILLIGRAM(S): 50 CAPSULE, EXTENDED RELEASE ORAL at 09:40

## 2021-05-22 RX ADMIN — HEPARIN SODIUM 5000 UNIT(S): 5000 INJECTION INTRAVENOUS; SUBCUTANEOUS at 05:08

## 2021-05-22 RX ADMIN — HEPARIN SODIUM 5000 UNIT(S): 5000 INJECTION INTRAVENOUS; SUBCUTANEOUS at 14:23

## 2021-05-22 RX ADMIN — HEPARIN SODIUM 5000 UNIT(S): 5000 INJECTION INTRAVENOUS; SUBCUTANEOUS at 21:14

## 2021-05-22 RX ADMIN — HYDROMORPHONE HYDROCHLORIDE 0.25 MILLIGRAM(S): 2 INJECTION INTRAMUSCULAR; INTRAVENOUS; SUBCUTANEOUS at 00:26

## 2021-05-22 RX ADMIN — HYDROMORPHONE HYDROCHLORIDE 0.25 MILLIGRAM(S): 2 INJECTION INTRAMUSCULAR; INTRAVENOUS; SUBCUTANEOUS at 00:56

## 2021-05-22 RX ADMIN — Medication 650 MILLIGRAM(S): at 23:32

## 2021-05-22 RX ADMIN — Medication 650 MILLIGRAM(S): at 23:00

## 2021-05-22 RX ADMIN — CHLORHEXIDINE GLUCONATE 1 APPLICATION(S): 213 SOLUTION TOPICAL at 05:08

## 2021-05-22 RX ADMIN — Medication 650 MILLIGRAM(S): at 16:57

## 2021-05-22 NOTE — DIETITIAN INITIAL EVALUATION ADULT. - RD TO REMAIN AVAILABLE
Intervention: 1.Meals and Snacks 2.Medical Food Supplement 3.Nutrition Related Medication    Monitor/Evaluate: Diet order, energy intake, nutrition focused physical findings/yes

## 2021-05-22 NOTE — PROGRESS NOTE ADULT - ASSESSMENT
ASSESSMENT  75F w/ PMH of HTN and b/l lumpectomies (10-15 years ago, reportedly benign pathology) presented to the ED with worsening R breast pain. Pt reports R breast biopsy last week Tuesday, 5/11. She was told to keep the dressing on for 5 days. Since the biopsy, she has been experiencing R breast pain. 2 days ago, she removed the dressing and saw erythema and small amount of necrotic tissue overlying R underside of breast. This morning the abscess ruptured and began putting out pus and necrotic tissue.    IMPRESSION  #Severe Sepsis on admission lactic acidosis due to necrotic R breast abscess    s/p OR WCX   Rare Gram Variable Rods seen per oil power field    Rare Gram positive cocci in pairs seen per oil power field    s/p Exploration, wound, chest 20-May-2021 08:50:47 right Michelle Wagner  Incision and drainage, breast, with debridement 20-May-2021 "extensive necrotizing soft tissue infection occupying more than 2/3rds of her breast, debrided to healthy tissue, packed with kerlex"      UCX GNR    s/p I&D exudate, purulent drainage, necrotic tissue    MRSA PCR Result.: Negative (05-19-21 @ 10:40)  #CoNS in bcx is a contaminant 1/4 bottles    5/18 1/4 bottles coNS  #Hyponatremia  Creatinine, Serum: 0.8 (05-19-21 @ 05:34)    Weight (kg): 63.3 (05-18-21 @ 16:27)    RECOMMENDATIONS  - wound Cx 5/20 organisms seen on stain but no growth   - follow-up Wound Cx 5/22  - continue zosyn 3.375 Gram(s) IV Intermittent every 8 hours  - follow-up CRP     Please call or message on [employer] Teams if with any questions.  Spectra [phone]

## 2021-05-22 NOTE — DIETITIAN INITIAL EVALUATION ADULT. - OTHER CALCULATIONS
Estimated Calorie Needs: [id number] x AF 1.2-1.6=6949-4186icgm/day;  Estimated Protein Needs: 63-82grams/day (1-1.3grams/kg of admit weight) -Due to age;  Estimated Fluid Needs: 1254-1359mL/day (1mL/kcal)

## 2021-05-22 NOTE — CHART NOTE - NSCHARTNOTEFT_GEN_A_CORE
Post Operative Note  Patient: CASSIDY NOE 75y (1945) Female   MRN: [id number]  Location: [address]  Visit: 05-18-21 Inpatient  Date: 05-22-21 @ 13:59    Procedure: Necrotizing soft tissue infection   S/P Exploration, wound, chest  Incision and drainage, breast, with debridement  Irrigation and excisional debridement of musculofascial tissue    Subjective: Pain controlled, patient resting comfortably, however complaining she does not have an appetite so has not tried eating yet. Otherwise she is hemodynamically stable and in no acute sign of distress. Dressing is c/d/i    Objective:  Vitals: T(F): 96.9 (05-22-21 @ 10:34), Max: 99.4 (05-22-21 @ 01:04)  HR: 84 (05-22-21 @ 10:34)  BP: 144/67 (05-22-21 @ 10:34) (132/63 - 164/74)  RR: 18 (05-22-21 @ 10:34)  SpO2: 96% (05-22-21 @ 10:00)  Vent Settings:     In:   05-21-21 @ 07:01  -  05-22-21 @ 07:00  --------------------------------------------------------  IN: 800 mL      IV Fluids:     Out:   05-21-21 @ 07:01  -  05-22-21 @ 07:00  --------------------------------------------------------  OUT: 0 mL      EBL:     Voided Urine:   05-21-21 @ 07:01  -  05-22-21 @ 07:00  --------------------------------------------------------  OUT: 0 mL      Physical Examination:  General Appearance: NAD  Heart: RRR  Lungs: CTABL  Incisions/Wounds: Dressings in place, clean, dry and intact, no signs of infection/active bleeding/drainage    Medications: [Standing]  acetaminophen   Tablet .. 650 milliGRAM(s) Oral every 6 hours  chlorhexidine 4% Liquid 1 Application(s) Topical <User Schedule>  dronabinol 2.5 milliGRAM(s) Oral two times a day  heparin   Injectable 5000 Unit(s) SubCutaneous every 8 hours  lisinopril 20 milliGRAM(s) Oral daily  oxyCODONE    IR 5 milliGRAM(s) Oral every 6 hours PRN  piperacillin/tazobactam IVPB.. 3.375 Gram(s) IV Intermittent every 8 hours  senna 2 Tablet(s) Oral at bedtime  traZODone 50 milliGRAM(s) Oral at bedtime PRN    Medications: [PRN]  acetaminophen   Tablet .. 650 milliGRAM(s) Oral every 6 hours  chlorhexidine 4% Liquid 1 Application(s) Topical <User Schedule>  dronabinol 2.5 milliGRAM(s) Oral two times a day  heparin   Injectable 5000 Unit(s) SubCutaneous every 8 hours  lisinopril 20 milliGRAM(s) Oral daily  oxyCODONE    IR 5 milliGRAM(s) Oral every 6 hours PRN  piperacillin/tazobactam IVPB.. 3.375 Gram(s) IV Intermittent every 8 hours  senna 2 Tablet(s) Oral at bedtime  traZODone 50 milliGRAM(s) Oral at bedtime PRN    Labs:                        9.0    12.05 )-----------( 377      ( 21 May 2021 20:40 )             27.5     05-21    138  |  106  |  26<H>  ----------------------------<  147<H>  4.1   |  24  |  0.8    Ca    7.7<L>      21 May 2021 20:40  Phos  3.3     05-21  Mg     1.5     05-21    PT/INR - ( 21 May 2021 20:40 )   PT: 12.10 sec;   INR: 1.05 ratio        PTT - ( 21 May 2021 20:40 )  PTT:24.5 sec    Imaging:  No post-op imaging studies    Assessment:  75yFemale patient S/P incision and debridement of right breast soft tissue infection, doing well post operatively, will need to RTOR Monday 5/24/21    Plan:  - Preop for monday  - resume diet, NPO @ [facility] Sunday night  - dressing change 5/23  - Monitor vitals  - Monitor post-op labs and replete as necessary  - Monitor for bowel function  - Continue Pain Medications if necessary  - Continue Antibiotics  - Encourage ambulation as tolerated  - Monitor urine output  - DVT and GI Prophylaxis  - Monitor wound and dressing for changes, redress as needed.      Date/Time: 05-22-21 @ 13:59

## 2021-05-22 NOTE — DIETITIAN INITIAL EVALUATION ADULT. - ORAL INTAKE PTA/DIET HISTORY
Unable to to conduct a face to face interview at this time since the patient is off the unit in the recovery room. Will conduct a face to face interview once they return to the unit.

## 2021-05-22 NOTE — PROGRESS NOTE ADULT - ASSESSMENT
75y Female patient   Patient seen and examined at bedside. NAD.   Tolerating:  Diet, NPO after Midnight:      NPO Start Date: 21-May-2021,   NPO Start Time: 23:59 (05-21-21 @ 10:33)  Diet, DASH/TLC:   Sodium & Cholesterol Restricted (05-20-21 @ 09:30)    Plan:   dressing changes q8h with kerlix wet to dry  -OR today, consented and NPO  - Monitor vitals  - Monitor labs and replete as necessary  - Monitor for bowel function  - Continue Pain Medications if necessary  - Continue Antibiotics if necessary  - Encourage ambulation as tolerated  - Monitor urine output  - DVT and GI Prophylaxis  - Follow PT/Physiatry if necessary  - Contact social work/case management for necessary patient needs and dispo planning      Date/Time: 05-22-21 @ 01:13

## 2021-05-22 NOTE — DIETITIAN INITIAL EVALUATION ADULT. - PERTINENT LABORATORY DATA
(5/21) Na-138, K-4.1, CL-106, BUN-26, Cr-0.8, Glucose-147mg/dL, Corrected Ca-8.5 (WNL), Mg-1.5, Phos-3.3, H/H-9/27.5, WBC-12.05

## 2021-05-22 NOTE — DIETITIAN INITIAL EVALUATION ADULT. - COLLABORATION WITH OTHER PROVIDERS
I spoke to the patient's physician assistant name Montse Keys ([facility] #[phone]) regarding my nutritional recommendations. I spoke to the patient's physician name Dr. Friend ([facility] #[phone]) regarding my nutritional recommendations.

## 2021-05-22 NOTE — DIETITIAN INITIAL EVALUATION ADULT. - OTHER INFO
Dx: 76y/o female with pmhx noted above presented with worsening right breast pain. Admitted with severe sepsis on admission and lactic acidosis due to necrotic right breast abscess. Found to have rare gram variable rods. S/p I&D, debridement and exploration (5/20). No pressure injury is noted (Joel Score-22).

## 2021-05-22 NOTE — DIETITIAN INITIAL EVALUATION ADULT. - NSPROEDAREADYLEARN_GEN_A_NUR
Nutrition education is deferred since the patient is off the unit. Will provide nutrition education once the patient returns to the unit.

## 2021-05-22 NOTE — PROGRESS NOTE ADULT - SUBJECTIVE AND OBJECTIVE BOX
CASSIDY NOE  75y, Female  Allergy: No Known Allergies      LOS  4d    CHIEF COMPLAINT: right breast abscess (22 May 2021 01:12)      INTERVAL EVENTS/HPI  - No acute events overnight  - T(F): , Max: 99.4 (05-22-21 @ 01:04)  - further debridement this AM   - WBC Count: 12.05 (05-21-21 @ 20:40)  WBC Count: 16.60 (05-20-21 @ 20:00)     - Creatinine, Serum: 0.8 (05-21-21 @ 20:40)  Creatinine, Serum: 0.8 (05-20-21 @ 20:00)       ROS  General: Denies rigors, nightsweats  HEENT: Denies headache, rhinorrhea, sore throat, eye pain  CV: Denies CP, palpitations  PULM: Denies wheezing, hemoptysis  GI: Denies hematemesis, hematochezia, melena  : Denies discharge, hematuria  MSK: Denies arthralgias, myalgias  SKIN: Denies rash, lesions  NEURO: Denies paresthesias, weakness  PSYCH: Denies depression, anxiety    VITALS:  T(F): 96.9, Max: 99.4 (05-22-21 @ 01:04)  HR: 84  BP: 144/67  RR: 18Vital Signs Last 24 Hrs  T(C): 36.1 (22 May 2021 10:34), Max: 37.4 (22 May 2021 01:04)  T(F): 96.9 (22 May 2021 10:34), Max: 99.4 (22 May 2021 01:04)  HR: 84 (22 May 2021 10:34) (79 - 99)  BP: 144/67 (22 May 2021 10:34) (132/63 - 164/74)  BP(mean): --  RR: 18 (22 May 2021 10:34) (18 - 18)  SpO2: 96% (22 May 2021 10:00) (94% - 98%)    PHYSICAL EXAM:  Gen: NAD, resting in bed  HEENT: Normocephalic, atraumatic  Neck: supple, no lymphadenopathy  CV: Regular rate & regular rhythm  Lungs: decreased BS at bases, no fremitus  Abdomen: Soft, BS present  Ext: Warm, well perfused  Neuro: non focal, awake  Skin: breast with dressings   Lines: no phlebitis    FH: Non-contributory  Social Hx: Non-contributory    TESTS & MEASUREMENTS:                        9.0    12.05 )-----------( 377      ( 21 May 2021 20:40 )             27.5     05-21    138  |  106  |  26<H>  ----------------------------<  147<H>  4.1   |  24  |  0.8    Ca    7.7<L>      21 May 2021 20:40  Phos  3.3     05-21  Mg     1.5     05-21      eGFR if Non African American: 71 mL/min/1.73M2 (05-21-21 @ 20:40)  eGFR if : 82 mL/min/1.73M2 (05-21-21 @ 20:40)          Culture - Tissue with Gram Stain (collected 05-20-21 @ 10:44)  Source: .Tissue None  Gram Stain (05-20-21 @ 21:54):    Rare polymorphonuclear leukocytes seen per low power field    Rare Gram Variable Rods seen per oil power field    Rare Gram positive cocci in pairs seen per oil power field  Preliminary Report (05-21-21 @ 16:09):    No growth    Culture - Urine (collected 05-18-21 @ 13:17)  Source: .Urine Clean Catch (Midstream)  Final Report (05-22-21 @ 09:44):    10,000 - 49,000 CFU/mL Escherichia coli    <10,000 CFU/ml Normal Urogenital johanna present  Organism: Escherichia coli (05-22-21 @ 09:44)  Organism: Escherichia coli (05-22-21 @ 09:44)      -  Amikacin: S <=16      -  Amoxicillin/Clavulanic Acid: S <=8/4      -  Ampicillin: R >16 These ampicillin results predict results for amoxicillin      -  Ampicillin/Sulbactam: I 16/8 Enterobacter, Citrobacter, and Serratia may develop resistance during prolonged therapy (3-4 days)      -  Aztreonam: S <=4      -  Cefazolin: S <=2 (MIC_CL_COM_ENTERIC_CEFAZU) For uncomplicated UTI with K. pneumoniae, E. coli, or P. mirablis: RA <=16 is sensitive and RA >=32 is resistant. This also predicts results for oral agents cefaclor, cefdinir, cefpodoxime, cefprozil, cefuroxime axetil, cephalexin and locarbef for uncomplicated UTI. Note that some isolates may be susceptible to these agents while testing resistant to cefazolin.      -  Cefepime: S <=2      -  Cefoxitin: S <=8      -  Ceftriaxone: S <=1 Enterobacter, Citrobacter, and Serratia may develop resistance during prolonged therapy      -  Ciprofloxacin: S <=0.25      -  Ertapenem: S <=0.5      -  Gentamicin: S <=2      -  Imipenem: S <=1      -  Levofloxacin: S <=0.5      -  Meropenem: S <=1      -  Nitrofurantoin: S <=32 Should not be used to treat pyelonephritis      -  Piperacillin/Tazobactam: S <=8      -  Tigecycline: S <=2      -  Tobramycin: S <=2      -  Trimethoprim/Sulfamethoxazole: R >2/38      Method Type: RA    Culture - Blood (collected 05-18-21 @ 11:00)  Source: .Blood Blood-Peripheral  Gram Stain (05-20-21 @ 21:44):    Growth in anaerobic bottle: Gram Positive Cocci in Clusters  Final Report (05-21-21 @ 18:02):    Growth in anaerobic bottle: Staphylococcus cohnii    Coag Negative Staphylococcus    Single set isolate, possible contaminant. Contact    Microbiology if susceptibility testing clinically    indicated.    ***Blood Panel PCR results on this specimen are available    approximately 3 hours after the Gram stain result.***    Gram stain, PCR, and/or culture results may not always    correspond due to difference in methodologies.    ************************************************************    This PCR assay was performed by multiplex PCR. This    Assay tests for 66 bacterial and resistance gene targets.    Please refer to the [facility] system [employer] test directory    at [email or web address] for details.  Organism: Blood Culture PCR (05-21-21 @ 18:02)  Organism: Blood Culture PCR (05-21-21 @ 18:02)      -  Coagulase negative Staphylococcus: Detec      Method Type: PCR    Culture - Blood (collected 05-18-21 @ 11:00)  Source: .Blood Blood-Peripheral  Preliminary Report (05-19-21 @ 23:04):    No growth to date.        Lactate, Blood: 1.5 mmol/L (05-18-21 @ 22:00)  Lactate, Blood: 1.9 mmol/L (05-18-21 @ 18:19)  Lactate, Blood: 1.3 mmol/L (05-18-21 @ 12:07)  Lactate, Blood: 2.6 mmol/L (05-18-21 @ 10:30)      INFECTIOUS DISEASES TESTING  COVID-19 PCR: NotDetec (05-21-21 @ 23:12)  MRSA PCR Result.: Negative (05-19-21 @ 10:40)  COVID-19 PCR: NotDetec (05-18-21 @ 10:14)      INFLAMMATORY MARKERS  Sedimentation Rate, Erythrocyte: 85 mm/Hr (05-21-21 @ 20:40)  C-Reactive Protein, Serum: 80 mg/L (05-21-21 @ 20:40)      RADIOLOGY & ADDITIONAL TESTS:  I have personally reviewed the last available Chest xray  CXR      CT      CARDIOLOGY TESTING  12 Lead ECG:   Ventricular Rate 89 BPM    Atrial Rate 89 BPM    P-R Interval 150 ms    QRS Duration 90 ms    Q-T Interval 366 ms    QTC Calculation(Bazett) 445 ms    P Axis 75 degrees    R Axis 78 degrees    T Axis 70 degrees    Diagnosis Line Normal sinus rhythm  Normal ECG    Confirmed by KENDRA HOLT MD ([id number]) on 5/18/2021 3:22:53 PM (05-18-21 @ 14:27)  12 Lead ECG:   Ventricular Rate 92 BPM    Atrial Rate 92 BPM    P-R Interval 142 ms    QRS Duration 78 ms    Q-T Interval 358 ms    QTC Calculation(Bazett) 442 ms    P Axis 75 degrees    R Axis 65 degrees    T Axis 67 degrees    Diagnosis Line Sinus rhythm with Premature atrial complexes  ST elevation, consider early repolarization  Borderline ECG    Confirmed by KENDRA HOLT MD ([id number]) on 5/18/2021 12:58:02 PM (05-18-21 @ 11:16)      MEDICATIONS  acetaminophen   Tablet .. 650 Oral every 6 hours  chlorhexidine 4% Liquid 1 Topical <User Schedule>  heparin   Injectable 5000 SubCutaneous every 8 hours  lisinopril 20 Oral daily  piperacillin/tazobactam IVPB.. 3.375 IV Intermittent every 8 hours  senna 2 Oral at bedtime      WEIGHT  Weight (kg): 63.3 (05-22-21 @ 07:43)  Creatinine, Serum: 0.8 mg/dL (05-21-21 @ 20:40)      ANTIBIOTICS:  piperacillin/tazobactam IVPB.. 3.375 Gram(s) IV Intermittent every 8 hours      All available historical records have been reviewed

## 2021-05-22 NOTE — PROGRESS NOTE ADULT - SUBJECTIVE AND OBJECTIVE BOX
Progress Note: General Surgery  Patient: CASSIDY NOE , 75y (1945)Female   MRN: [id number]  Location: [address]  Visit: 05-18-21 Inpatient  Date: 05-22-21 @ 01:13    Admit Diagnosis/Chief Complaint: Necrotizing soft tissue infection        Procedure/Diagnosis: Necrotizing soft tissue infection S/P Exploration, wound, chest incision and drainage, breast, with debridement of right breast        Events/ 24h: Patient seen and examined at bedside. No acute events overnight. Afebrile, VSS.    Vitals: T(F): 97.8 (05-22-21 @ 01:05), Max: 97.8 (05-22-21 @ 01:05)  HR: 86 (05-22-21 @ 01:05)  BP: 134/61 (05-22-21 @ 01:05) (134/61 - 162/74)  RR: 18 (05-22-21 @ 01:05)  SpO2: --    In:   05-20-21 @ 07:01  -  05-21-21 @ 07:00  --------------------------------------------------------  IN: 0 mL      Out:   05-20-21 @ 07:01  -  05-21-21 @ 07:00  --------------------------------------------------------  OUT:    Voided (mL): 600 mL  Total OUT: 600 mL        Net:   05-20-21 @ 07:01  -  05-21-21 @ 07:00  --------------------------------------------------------  NET: -600 mL        Diet: Diet, NPO after Midnight:      NPO Start Date: 21-May-2021,   NPO Start Time: 23:59 (05-21-21 @ 10:33)  Diet, DASH/TLC:   Sodium & Cholesterol Restricted (05-20-21 @ 09:30)    IV Fluids: sodium chloride 0.9%. 1000 milliLiter(s) (100 mL/Hr) IV Continuous <Continuous>      Physical Examination:  General Appearance: NAD   HEENT: EOMI, sclera anicteric.  Heart: RRR   Lungs: Symmetric chest wall expansion, equal rise and fall.  Abdomen:  Soft, nontender, nondistended.   MSK/Extremities: Warm & well-perfused.   Skin: Warm, dry. No jaundice.       Medications: [Standing]  chlorhexidine 4% Liquid 1 Application(s) Topical <User Schedule>  chlorhexidine 4% Liquid 1 Application(s) Topical <User Schedule>  heparin   Injectable 5000 Unit(s) SubCutaneous every 8 hours  lisinopril 20 milliGRAM(s) Oral daily  pantoprazole    Tablet 40 milliGRAM(s) Oral before breakfast  piperacillin/tazobactam IVPB.. 3.375 Gram(s) IV Intermittent every 8 hours  senna 2 Tablet(s) Oral at bedtime  sodium chloride 0.9%. 1000 milliLiter(s) (100 mL/Hr) IV Continuous <Continuous>    DVT Prophylaxis: heparin   Injectable 5000 Unit(s) SubCutaneous every 8 hours    GI Prophylaxis: pantoprazole    Tablet 40 milliGRAM(s) Oral before breakfast    Antibiotics: piperacillin/tazobactam IVPB.. 3.375 Gram(s) IV Intermittent every 8 hours    Anticoagulation:   Medications:[PRN]  acetaminophen   Tablet .. 650 milliGRAM(s) Oral every 6 hours PRN  ibuprofen  Tablet. 400 milliGRAM(s) Oral every 6 hours PRN  oxyCODONE    IR 5 milliGRAM(s) Oral every 6 hours PRN  traZODone 50 milliGRAM(s) Oral at bedtime PRN      Labs:                        9.0    12.05 )-----------( 377      ( 21 May 2021 20:40 )             27.5     05-21    138  |  106  |  26<H>  ----------------------------<  147<H>  4.1   |  24  |  0.8    Ca    7.7<L>      21 May 2021 20:40  Phos  3.3     05-21  Mg     1.5     05-21        PT/INR - ( 21 May 2021 20:40 )   PT: 12.10 sec;   INR: 1.05 ratio         PTT - ( 21 May 2021 20:40 )  PTT:24.5 sec          Urine/Micro:    Culture - Tissue with Gram Stain (collected 20 May 2021 10:44)  Source: .Tissue None  Gram Stain (20 May 2021 21:54):    Rare polymorphonuclear leukocytes seen per low power field    Rare Gram Variable Rods seen per oil power field    Rare Gram positive cocci in pairs seen per oil power field  Preliminary Report (21 May 2021 16:09):    No growth          Imaging:

## 2021-05-23 LAB
ANION GAP SERPL CALC-SCNC: 6 MMOL/L — LOW (ref 7–14)
APTT BLD: 23.9 SEC — CRITICAL LOW (ref 27–39.2)
BASOPHILS # BLD AUTO: 0.02 K/UL — SIGNIFICANT CHANGE UP (ref 0–0.2)
BASOPHILS NFR BLD AUTO: 0.1 % — SIGNIFICANT CHANGE UP (ref 0–1)
BLD GP AB SCN SERPL QL: SIGNIFICANT CHANGE UP
BUN SERPL-MCNC: 21 MG/DL — HIGH (ref 10–20)
CALCIUM SERPL-MCNC: 7.7 MG/DL — LOW (ref 8.5–10.1)
CHLORIDE SERPL-SCNC: 100 MMOL/L — SIGNIFICANT CHANGE UP (ref 98–110)
CO2 SERPL-SCNC: 29 MMOL/L — SIGNIFICANT CHANGE UP (ref 17–32)
CREAT SERPL-MCNC: 0.7 MG/DL — SIGNIFICANT CHANGE UP (ref 0.7–1.5)
CULTURE RESULTS: SIGNIFICANT CHANGE UP
EOSINOPHIL # BLD AUTO: 0.14 K/UL — SIGNIFICANT CHANGE UP (ref 0–0.7)
EOSINOPHIL NFR BLD AUTO: 0.9 % — SIGNIFICANT CHANGE UP (ref 0–8)
GLUCOSE BLDC GLUCOMTR-MCNC: 119 MG/DL — HIGH (ref 70–99)
GLUCOSE BLDC GLUCOMTR-MCNC: 119 MG/DL — HIGH (ref 70–99)
GLUCOSE BLDC GLUCOMTR-MCNC: 130 MG/DL — HIGH (ref 70–99)
GLUCOSE BLDC GLUCOMTR-MCNC: 159 MG/DL — HIGH (ref 70–99)
GLUCOSE SERPL-MCNC: 132 MG/DL — HIGH (ref 70–99)
HCT VFR BLD CALC: 28.7 % — LOW (ref 37–47)
HGB BLD-MCNC: 9.3 G/DL — LOW (ref 12–16)
IMM GRANULOCYTES NFR BLD AUTO: 2.1 % — HIGH (ref 0.1–0.3)
INR BLD: 1.03 RATIO — SIGNIFICANT CHANGE UP (ref 0.65–1.3)
LYMPHOCYTES # BLD AUTO: 13.7 % — LOW (ref 20.5–51.1)
LYMPHOCYTES # BLD AUTO: 2.1 K/UL — SIGNIFICANT CHANGE UP (ref 1.2–3.4)
MAGNESIUM SERPL-MCNC: 1.4 MG/DL — LOW (ref 1.8–2.4)
MCHC RBC-ENTMCNC: 27.5 PG — SIGNIFICANT CHANGE UP (ref 27–31)
MCHC RBC-ENTMCNC: 32.4 G/DL — SIGNIFICANT CHANGE UP (ref 32–37)
MCV RBC AUTO: 84.9 FL — SIGNIFICANT CHANGE UP (ref 81–99)
MONOCYTES # BLD AUTO: 0.68 K/UL — HIGH (ref 0.1–0.6)
MONOCYTES NFR BLD AUTO: 4.4 % — SIGNIFICANT CHANGE UP (ref 1.7–9.3)
NEUTROPHILS # BLD AUTO: 12.04 K/UL — HIGH (ref 1.4–6.5)
NEUTROPHILS NFR BLD AUTO: 78.8 % — HIGH (ref 42.2–75.2)
NRBC # BLD: 0 /100 WBCS — SIGNIFICANT CHANGE UP (ref 0–0)
PHOSPHATE SERPL-MCNC: 2.4 MG/DL — SIGNIFICANT CHANGE UP (ref 2.1–4.9)
PLATELET # BLD AUTO: 449 K/UL — HIGH (ref 130–400)
POTASSIUM SERPL-MCNC: 4.7 MMOL/L — SIGNIFICANT CHANGE UP (ref 3.5–5)
POTASSIUM SERPL-SCNC: 4.7 MMOL/L — SIGNIFICANT CHANGE UP (ref 3.5–5)
PROTHROM AB SERPL-ACNC: 11.9 SEC — SIGNIFICANT CHANGE UP (ref 9.95–12.87)
RBC # BLD: 3.38 M/UL — LOW (ref 4.2–5.4)
RBC # FLD: 14.7 % — HIGH (ref 11.5–14.5)
SODIUM SERPL-SCNC: 135 MMOL/L — SIGNIFICANT CHANGE UP (ref 135–146)
SPECIMEN SOURCE: SIGNIFICANT CHANGE UP
WBC # BLD: 15.3 K/UL — HIGH (ref 4.8–10.8)
WBC # FLD AUTO: 15.3 K/UL — HIGH (ref 4.8–10.8)

## 2021-05-23 RX ORDER — PANTOPRAZOLE SODIUM 20 MG/1
40 TABLET, DELAYED RELEASE ORAL
Refills: 0 | Status: DISCONTINUED | OUTPATIENT
Start: 2021-05-23 | End: 2021-05-24

## 2021-05-23 RX ORDER — MAGNESIUM SULFATE 500 MG/ML
2 VIAL (ML) INJECTION ONCE
Refills: 0 | Status: COMPLETED | OUTPATIENT
Start: 2021-05-23 | End: 2021-05-23

## 2021-05-23 RX ORDER — KETOROLAC TROMETHAMINE 30 MG/ML
15 SYRINGE (ML) INJECTION EVERY 8 HOURS
Refills: 0 | Status: DISCONTINUED | OUTPATIENT
Start: 2021-05-23 | End: 2021-05-24

## 2021-05-23 RX ORDER — POLYETHYLENE GLYCOL 3350 17 G/17G
17 POWDER, FOR SOLUTION ORAL DAILY
Refills: 0 | Status: DISCONTINUED | OUTPATIENT
Start: 2021-05-23 | End: 2021-05-24

## 2021-05-23 RX ORDER — SODIUM CHLORIDE 9 MG/ML
1000 INJECTION, SOLUTION INTRAVENOUS
Refills: 0 | Status: DISCONTINUED | OUTPATIENT
Start: 2021-05-23 | End: 2021-05-24

## 2021-05-23 RX ADMIN — OXYCODONE HYDROCHLORIDE 5 MILLIGRAM(S): 5 TABLET ORAL at 18:22

## 2021-05-23 RX ADMIN — OXYCODONE HYDROCHLORIDE 5 MILLIGRAM(S): 5 TABLET ORAL at 09:17

## 2021-05-23 RX ADMIN — Medication 650 MILLIGRAM(S): at 23:09

## 2021-05-23 RX ADMIN — Medication 50 MILLIGRAM(S): at 01:01

## 2021-05-23 RX ADMIN — LISINOPRIL 20 MILLIGRAM(S): 2.5 TABLET ORAL at 05:06

## 2021-05-23 RX ADMIN — OXYCODONE HYDROCHLORIDE 5 MILLIGRAM(S): 5 TABLET ORAL at 15:39

## 2021-05-23 RX ADMIN — Medication 650 MILLIGRAM(S): at 11:34

## 2021-05-23 RX ADMIN — POLYETHYLENE GLYCOL 3350 17 GRAM(S): 17 POWDER, FOR SOLUTION ORAL at 21:15

## 2021-05-23 RX ADMIN — Medication 650 MILLIGRAM(S): at 05:42

## 2021-05-23 RX ADMIN — Medication 650 MILLIGRAM(S): at 18:21

## 2021-05-23 RX ADMIN — HEPARIN SODIUM 5000 UNIT(S): 5000 INJECTION INTRAVENOUS; SUBCUTANEOUS at 05:07

## 2021-05-23 RX ADMIN — HEPARIN SODIUM 5000 UNIT(S): 5000 INJECTION INTRAVENOUS; SUBCUTANEOUS at 21:16

## 2021-05-23 RX ADMIN — HEPARIN SODIUM 5000 UNIT(S): 5000 INJECTION INTRAVENOUS; SUBCUTANEOUS at 13:54

## 2021-05-23 RX ADMIN — PIPERACILLIN AND TAZOBACTAM 25 GRAM(S): 4; .5 INJECTION, POWDER, LYOPHILIZED, FOR SOLUTION INTRAVENOUS at 13:54

## 2021-05-23 RX ADMIN — Medication 50 GRAM(S): at 05:07

## 2021-05-23 RX ADMIN — Medication 2.5 MILLIGRAM(S): at 16:59

## 2021-05-23 RX ADMIN — PIPERACILLIN AND TAZOBACTAM 25 GRAM(S): 4; .5 INJECTION, POWDER, LYOPHILIZED, FOR SOLUTION INTRAVENOUS at 05:07

## 2021-05-23 RX ADMIN — Medication 650 MILLIGRAM(S): at 05:06

## 2021-05-23 RX ADMIN — PANTOPRAZOLE SODIUM 40 MILLIGRAM(S): 20 TABLET, DELAYED RELEASE ORAL at 16:59

## 2021-05-23 RX ADMIN — Medication 650 MILLIGRAM(S): at 23:33

## 2021-05-23 RX ADMIN — SENNA PLUS 2 TABLET(S): 8.6 TABLET ORAL at 21:16

## 2021-05-23 RX ADMIN — PIPERACILLIN AND TAZOBACTAM 25 GRAM(S): 4; .5 INJECTION, POWDER, LYOPHILIZED, FOR SOLUTION INTRAVENOUS at 21:16

## 2021-05-23 RX ADMIN — Medication 650 MILLIGRAM(S): at 16:59

## 2021-05-23 RX ADMIN — Medication 2.5 MILLIGRAM(S): at 05:07

## 2021-05-23 RX ADMIN — Medication 650 MILLIGRAM(S): at 18:22

## 2021-05-23 NOTE — PROGRESS NOTE ADULT - ASSESSMENT
ASSESSMENT  75F w/ PMH of HTN and b/l lumpectomies (10-15 years ago, reportedly benign pathology) presented to the ED with worsening R breast pain. Pt reports R breast biopsy last week Tuesday, 5/11. She was told to keep the dressing on for 5 days. Since the biopsy, she has been experiencing R breast pain. 2 days ago, she removed the dressing and saw erythema and small amount of necrotic tissue overlying R underside of breast. This morning the abscess ruptured and began putting out pus and necrotic tissue.    IMPRESSION  #Severe Sepsis on admission lactic acidosis due to necrotic R breast abscess    s/p OR WCX 5/20 -- Coag negative staph, Prevotella  field    s/p Exploration, wound, chest 20-May-2021 08:50:47 right Michelle Wagner Y  Incision and drainage, breast, with debridement 20-May-2021 "extensive necrotizing soft tissue infection occupying more than 2/3rds of her breast, debrided to healthy tissue, packed with kerlex"    s/p debridement 5/22 -- necrotizing soft tissue infection"      UCX GNR    s/p I&D exudate, purulent drainage, necrotic tissue    MRSA PCR Result.: Negative (05-19-21 @ 10:40)  #CoNS in bcx is a contaminant 1/4 bottles    5/18 1/4 bottles coNS  #Hyponatremia  Creatinine, Serum: 0.8 (05-19-21 @ 05:34)    Weight (kg): 63.3 (05-18-21 @ 16:27)    RECOMMENDATIONS  - wound Cx 5/20 with prevotella  - follow-up Wound Cx 5/22  - continue zosyn 3.375 Gram(s) IV Intermittent every 8 hours -- may possibly narrow to unasyn pending susceptibilities   - planned for further OR debridement tomorrow      Please call or message on [employer] Teams if with any questions.  Spectra [phone]

## 2021-05-23 NOTE — PROGRESS NOTE ADULT - SUBJECTIVE AND OBJECTIVE BOX
Progress Note: Surgery  Patient: CASSIDY NOE , 75y (1945)Female   MRN: [id number]  Location: [address]  Visit: 05-18-21 Inpatient  Date: 05-23-21 @ 07:45    Procedure/Diagnosis: POD 1 s/p right breast incision and debridement  Events over 24h: No acute event overnight. No new complaint. Pt is hemodynamically stable.     Vitals: T(F): 97.3 (05-23-21 @ 05:00), Max: 98 (05-22-21 @ 21:00)  HR: 83 (05-23-21 @ 06:37)  BP: 146/67 (05-23-21 @ 06:37) (108/51 - 162/68)  RR: 18 (05-23-21 @ 06:37)  SpO2: 96% (05-22-21 @ 10:00)      Diet: Diet, Regular:   Arthur(7 Gm Arginine/7 Gm Glut/1.2 Gm HMB     Qty per Day:  3  Supplement Feeding Modality:  Oral  Ensure Enlive Cans or Servings Per Day:  3       Frequency:  Three Times a day (05-22-21 @ 20:22)    IV Fluid:     In:   05-22-21 @ 07:01  -  05-23-21 @ 07:00  --------------------------------------------------------  IN: 515 mL      Out:   05-22-21 @ 07:01  -  05-23-21 @ 07:00  --------------------------------------------------------  OUT:    Voided (mL): 300 mL  Total OUT: 300 mL        Net:   05-22-21 @ 07:01  -  05-23-21 @ 07:00  --------------------------------------------------------  NET: 215 mL        Physical Examination:  General Appearance: NAD, alert and cooperative  Heart: S1 and S2. No murmurs. Rhythm is regular.  Lungs: Clear to auscultation BL without rales, rhonchi, wheezing, crackles or diminished breath sounds.  Abdomen: Soft, nondistended, nontender. No rigidity, guarding, or rebound tenderness.     Medications: [Standing]  acetaminophen   Tablet .. 650 milliGRAM(s) Oral every 6 hours  chlorhexidine 4% Liquid 1 Application(s) Topical <User Schedule>  dronabinol 2.5 milliGRAM(s) Oral two times a day  heparin   Injectable 5000 Unit(s) SubCutaneous every 8 hours  lisinopril 20 milliGRAM(s) Oral daily  piperacillin/tazobactam IVPB.. 3.375 Gram(s) IV Intermittent every 8 hours  senna 2 Tablet(s) Oral at bedtime    Medications:[PRN]  oxyCODONE    IR 5 milliGRAM(s) Oral every 6 hours PRN  traZODone 50 milliGRAM(s) Oral at bedtime PRN    Labs:                        8.8    15.13 )-----------( 366      ( 22 May 2021 21:21 )             27.7   05-22    134<L>  |  103  |  23<H>  ----------------------------<  181<H>  4.4   |  22  |  0.8    Ca    7.2<L>      22 May 2021 21:21  Phos  3.2     05-22  Mg     1.4     05-22    PT/INR - ( 21 May 2021 20:40 )   PT: 12.10 sec;   INR: 1.05 ratio         PTT - ( 21 May 2021 20:40 )  PTT:24.5 sec    Micro/Urine:    Culture - Tissue with Gram Stain (collected 05-22-21 @ 14:00)  Source: .Tissue None  Gram Stain (05-23-21 @ 02:35):    Few polymorphonuclear leukocytes seen per low power field    Few Gram positive cocci in pairs seen per oil power field    Rare Gram Variable Rods seen per oil power field      Imaging:  None/24h

## 2021-05-23 NOTE — PROGRESS NOTE ADULT - ASSESSMENT
Assessment:  75y Female patient admitted POD 1 s/p RTOR for incision and debridement of right breast abscess, tolerated procedure well, afebrile hemodynamically stable, with the above physical exam, labs, and imaging findings.    Plan:  - dressing change today  - c/w antibiotics  - f/u tissue cultures  - RTOR Monday 5.24  - NPO @ [facility], IVF  - preop labs  - repeat COVID  -Pain control as needed  -Hemodynamic monitoring as per routine  -Encourage ambulation and incentive spirometer use (10x/hr when awake)  -GI and DVT prophylaxis  -Check and replete CBC and BMP q daily  -Strict input and output monitoring  -Continue current management    Date/Time: 05-23-21 @ 07:45

## 2021-05-23 NOTE — PROGRESS NOTE ADULT - SUBJECTIVE AND OBJECTIVE BOX
CASSIDY NOE  75y, Female  Allergy: No Known Allergies      LOS  5d    CHIEF COMPLAINT: right breast abscess (23 May 2021 07:44)      INTERVAL EVENTS/HPI  - No acute events overnight  - T(F): , Max: 98 (05-22-21 @ 21:00)  - Denies any worsening symptoms  - Tolerating medication  - WBC Count: 15.13 (05-22-21 @ 21:21)  WBC Count: 12.05 (05-21-21 @ 20:40)     - Creatinine, Serum: 0.8 (05-22-21 @ 21:21)  Creatinine, Serum: 0.8 (05-21-21 @ 20:40)       ROS  General: Denies rigors, nightsweats  HEENT: Denies headache, rhinorrhea, sore throat, eye pain  CV: Denies CP, palpitations  PULM: Denies wheezing, hemoptysis  GI: Denies hematemesis, hematochezia, melena  : Denies discharge, hematuria  MSK: Denies arthralgias, myalgias  SKIN: Denies rash, lesions  NEURO: Denies paresthesias, weakness  PSYCH: Denies depression, anxiety    VITALS:  T(F): 97.6, Max: 98 (05-22-21 @ 21:00)  HR: 92  BP: 148/67  RR: 18Vital Signs Last 24 Hrs  T(C): 36.4 (23 May 2021 09:00), Max: 36.7 (22 May 2021 21:00)  T(F): 97.6 (23 May 2021 09:00), Max: 98 (22 May 2021 21:00)  HR: 92 (23 May 2021 09:00) (80 - 92)  BP: 148/67 (23 May 2021 09:00) (108/51 - 148/67)  BP(mean): --  RR: 18 (23 May 2021 09:00) (16 - 18)  SpO2: --    PHYSICAL EXAM:  Gen: NAD, resting in bed  HEENT: Normocephalic, atraumatic  Neck: supple, no lymphadenopathy  CV: Regular rate & regular rhythm  Lungs: decreased BS at bases, no fremitus  Abdomen: Soft, BS present  Ext: Warm, well perfused  Neuro: non focal, awake  Skin: breast wound dressed  Lines: no phlebitis    FH: Non-contributory  Social Hx: Non-contributory    TESTS & MEASUREMENTS:                        8.8    15.13 )-----------( 366      ( 22 May 2021 21:21 )             27.7     05-22    134<L>  |  103  |  23<H>  ----------------------------<  181<H>  4.4   |  22  |  0.8    Ca    7.2<L>      22 May 2021 21:21  Phos  3.2     05-22  Mg     1.4     05-22      eGFR if Non African American: 71 mL/min/1.73M2 (05-22-21 @ 21:21)  eGFR if : 82 mL/min/1.73M2 (05-22-21 @ 21:21)          Culture - Tissue with Gram Stain (collected 05-22-21 @ 14:00)  Source: .Tissue None  Gram Stain (05-23-21 @ 02:35):    Few polymorphonuclear leukocytes seen per low power field    Few Gram positive cocci in pairs seen per oil power field    Rare Gram Variable Rods seen per oil power field    Culture - Tissue with Gram Stain (collected 05-20-21 @ 10:44)  Source: .Tissue None  Gram Stain (05-20-21 @ 21:54):    Rare polymorphonuclear leukocytes seen per low power field    Rare Gram Variable Rods seen per oil power field    Rare Gram positive cocci in pairs seen per oil power field  Preliminary Report (05-22-21 @ 21:56):    Growth in fluid media only Coag Negative Staphylococcus    Anaerobic Gram Negative Rocky most closely resembling    Moderate Prevotella species "Susceptibilities not performed"    Culture - Urine (collected 05-18-21 @ 13:17)  Source: .Urine Clean Catch (Midstream)  Final Report (05-22-21 @ 09:44):    10,000 - 49,000 CFU/mL Escherichia coli    <10,000 CFU/ml Normal Urogenital johanna present  Organism: Escherichia coli (05-22-21 @ 09:44)  Organism: Escherichia coli (05-22-21 @ 09:44)      -  Amikacin: S <=16      -  Amoxicillin/Clavulanic Acid: S <=8/4      -  Ampicillin: R >16 These ampicillin results predict results for amoxicillin      -  Ampicillin/Sulbactam: I 16/8 Enterobacter, Citrobacter, and Serratia may develop resistance during prolonged therapy (3-4 days)      -  Aztreonam: S <=4      -  Cefazolin: S <=2 (MIC_CL_COM_ENTERIC_CEFAZU) For uncomplicated UTI with K. pneumoniae, E. coli, or P. mirablis: AR <=16 is sensitive and RA >=32 is resistant. This also predicts results for oral agents cefaclor, cefdinir, cefpodoxime, cefprozil, cefuroxime axetil, cephalexin and locarbef for uncomplicated UTI. Note that some isolates may be susceptible to these agents while testing resistant to cefazolin.      -  Cefepime: S <=2      -  Cefoxitin: S <=8      -  Ceftriaxone: S <=1 Enterobacter, Citrobacter, and Serratia may develop resistance during prolonged therapy      -  Ciprofloxacin: S <=0.25      -  Ertapenem: S <=0.5      -  Gentamicin: S <=2      -  Imipenem: S <=1      -  Levofloxacin: S <=0.5      -  Meropenem: S <=1      -  Nitrofurantoin: S <=32 Should not be used to treat pyelonephritis      -  Piperacillin/Tazobactam: S <=8      -  Tigecycline: S <=2      -  Tobramycin: S <=2      -  Trimethoprim/Sulfamethoxazole: R >2/38      Method Type: RA    Culture - Blood (collected 05-18-21 @ 11:00)  Source: .Blood Blood-Peripheral  Gram Stain (05-20-21 @ 21:44):    Growth in anaerobic bottle: Gram Positive Cocci in Clusters  Final Report (05-21-21 @ 18:02):    Growth in anaerobic bottle: Staphylococcus cohnii    Coag Negative Staphylococcus    Single set isolate, possible contaminant. Contact    Microbiology if susceptibility testing clinically    indicated.    ***Blood Panel PCR results on this specimen are available    approximately 3 hours after the Gram stain result.***    Gram stain, PCR, and/or culture results may not always    correspond due to difference in methodologies.    ************************************************************    This PCR assay was performed by multiplex PCR. This    Assay tests for 66 bacterial and resistance gene targets.    Please refer to the [facility] test directory    at [email or web address] for details.  Organism: Blood Culture PCR (05-21-21 @ 18:02)  Organism: Blood Culture PCR (05-21-21 @ 18:02)      -  Coagulase negative Staphylococcus: Detec      Method Type: PCR    Culture - Blood (collected 05-18-21 @ 11:00)  Source: .Blood Blood-Peripheral  Preliminary Report (05-19-21 @ 23:04):    No growth to date.        Lactate, Blood: 1.5 mmol/L (05-18-21 @ 22:00)  Lactate, Blood: 1.9 mmol/L (05-18-21 @ 18:19)      INFECTIOUS DISEASES TESTING  COVID-19 PCR: NotDetec (05-21-21 @ 23:12)  MRSA PCR Result.: Negative (05-19-21 @ 10:40)  COVID-19 PCR: NotDetec (05-18-21 @ 10:14)      INFLAMMATORY MARKERS  Sedimentation Rate, Erythrocyte: 85 mm/Hr (05-21-21 @ 20:40)  C-Reactive Protein, Serum: 80 mg/L (05-21-21 @ 20:40)      RADIOLOGY & ADDITIONAL TESTS:  I have personally reviewed the last available Chest xray  CXR      CT      CARDIOLOGY TESTING  12 Lead ECG:   Ventricular Rate 89 BPM    Atrial Rate 89 BPM    P-R Interval 150 ms    QRS Duration 90 ms    Q-T Interval 366 ms    QTC Calculation(Bazett) 445 ms    P Axis 75 degrees    R Axis 78 degrees    T Axis 70 degrees    Diagnosis Line Normal sinus rhythm  Normal ECG    Confirmed by KENDRA HOLT MD ([id number]) on 5/18/2021 3:22:53 PM (05-18-21 @ 14:27)  12 Lead ECG:   Ventricular Rate 92 BPM    Atrial Rate 92 BPM    P-R Interval 142 ms    QRS Duration 78 ms    Q-T Interval 358 ms    QTC Calculation(Bazett) 442 ms    P Axis 75 degrees    R Axis 65 degrees    T Axis 67 degrees    Diagnosis Line Sinus rhythm with Premature atrial complexes  ST elevation, consider early repolarization  Borderline ECG    Confirmed by KENDRA HOLT MD ([id number]) on 5/18/2021 12:58:02 PM (05-18-21 @ 11:16)      MEDICATIONS  acetaminophen   Tablet .. 650 Oral every 6 hours  chlorhexidine 4% Liquid 1 Topical <User Schedule>  dronabinol 2.5 Oral two times a day  heparin   Injectable 5000 SubCutaneous every 8 hours  lactated ringers. 1000 IV Continuous <Continuous>  lisinopril 20 Oral daily  piperacillin/tazobactam IVPB.. 3.375 IV Intermittent every 8 hours  senna 2 Oral at bedtime      WEIGHT  Weight (kg): 63.3 (05-23-21 @ 06:37)  Creatinine, Serum: 0.8 mg/dL (05-22-21 @ 21:21)      ANTIBIOTICS:  piperacillin/tazobactam IVPB.. 3.375 Gram(s) IV Intermittent every 8 hours      All available historical records have been reviewed

## 2021-05-24 LAB
ANION GAP SERPL CALC-SCNC: 13 MMOL/L — SIGNIFICANT CHANGE UP (ref 7–14)
BASOPHILS # BLD AUTO: 0.03 K/UL — SIGNIFICANT CHANGE UP (ref 0–0.2)
BASOPHILS NFR BLD AUTO: 0.2 % — SIGNIFICANT CHANGE UP (ref 0–1)
BUN SERPL-MCNC: 13 MG/DL — SIGNIFICANT CHANGE UP (ref 10–20)
CALCIUM SERPL-MCNC: 7.6 MG/DL — LOW (ref 8.5–10.1)
CHLORIDE SERPL-SCNC: 99 MMOL/L — SIGNIFICANT CHANGE UP (ref 98–110)
CO2 SERPL-SCNC: 24 MMOL/L — SIGNIFICANT CHANGE UP (ref 17–32)
CREAT SERPL-MCNC: 0.7 MG/DL — SIGNIFICANT CHANGE UP (ref 0.7–1.5)
EOSINOPHIL # BLD AUTO: 0.18 K/UL — SIGNIFICANT CHANGE UP (ref 0–0.7)
EOSINOPHIL NFR BLD AUTO: 1.2 % — SIGNIFICANT CHANGE UP (ref 0–8)
GLUCOSE BLDC GLUCOMTR-MCNC: 136 MG/DL — HIGH (ref 70–99)
GLUCOSE BLDC GLUCOMTR-MCNC: 137 MG/DL — HIGH (ref 70–99)
GLUCOSE BLDC GLUCOMTR-MCNC: 226 MG/DL — HIGH (ref 70–99)
GLUCOSE SERPL-MCNC: 187 MG/DL — HIGH (ref 70–99)
HCT VFR BLD CALC: 25.6 % — LOW (ref 37–47)
HGB BLD-MCNC: 8.1 G/DL — LOW (ref 12–16)
IMM GRANULOCYTES NFR BLD AUTO: 1.5 % — HIGH (ref 0.1–0.3)
LYMPHOCYTES # BLD AUTO: 1.61 K/UL — SIGNIFICANT CHANGE UP (ref 1.2–3.4)
LYMPHOCYTES # BLD AUTO: 10.3 % — LOW (ref 20.5–51.1)
MAGNESIUM SERPL-MCNC: 1.4 MG/DL — LOW (ref 1.8–2.4)
MCHC RBC-ENTMCNC: 27.2 PG — SIGNIFICANT CHANGE UP (ref 27–31)
MCHC RBC-ENTMCNC: 31.6 G/DL — LOW (ref 32–37)
MCV RBC AUTO: 85.9 FL — SIGNIFICANT CHANGE UP (ref 81–99)
MONOCYTES # BLD AUTO: 0.74 K/UL — HIGH (ref 0.1–0.6)
MONOCYTES NFR BLD AUTO: 4.7 % — SIGNIFICANT CHANGE UP (ref 1.7–9.3)
NEUTROPHILS # BLD AUTO: 12.78 K/UL — HIGH (ref 1.4–6.5)
NEUTROPHILS NFR BLD AUTO: 82.1 % — HIGH (ref 42.2–75.2)
NRBC # BLD: 0 /100 WBCS — SIGNIFICANT CHANGE UP (ref 0–0)
PHOSPHATE SERPL-MCNC: 3.3 MG/DL — SIGNIFICANT CHANGE UP (ref 2.1–4.9)
PLATELET # BLD AUTO: 432 K/UL — HIGH (ref 130–400)
POTASSIUM SERPL-MCNC: 4 MMOL/L — SIGNIFICANT CHANGE UP (ref 3.5–5)
POTASSIUM SERPL-SCNC: 4 MMOL/L — SIGNIFICANT CHANGE UP (ref 3.5–5)
RBC # BLD: 2.98 M/UL — LOW (ref 4.2–5.4)
RBC # FLD: 14.6 % — HIGH (ref 11.5–14.5)
SARS-COV-2 RNA SPEC QL NAA+PROBE: SIGNIFICANT CHANGE UP
SODIUM SERPL-SCNC: 136 MMOL/L — SIGNIFICANT CHANGE UP (ref 135–146)
WBC # BLD: 15.58 K/UL — HIGH (ref 4.8–10.8)
WBC # FLD AUTO: 15.58 K/UL — HIGH (ref 4.8–10.8)

## 2021-05-24 PROCEDURE — 11042 DBRDMT SUBQ TIS 1ST 20SQCM/<: CPT

## 2021-05-24 PROCEDURE — 97597 DBRDMT OPN WND 1ST 20 CM/<: CPT | Mod: 59

## 2021-05-24 PROCEDURE — 97605 NEG PRS WND THER DME<=50SQCM: CPT | Mod: 59

## 2021-05-24 RX ORDER — OXYCODONE HYDROCHLORIDE 5 MG/1
5 TABLET ORAL EVERY 6 HOURS
Refills: 0 | Status: DISCONTINUED | OUTPATIENT
Start: 2021-05-24 | End: 2021-05-27

## 2021-05-24 RX ORDER — KETOROLAC TROMETHAMINE 30 MG/ML
15 SYRINGE (ML) INJECTION EVERY 8 HOURS
Refills: 0 | Status: DISCONTINUED | OUTPATIENT
Start: 2021-05-24 | End: 2021-05-27

## 2021-05-24 RX ORDER — CHLORHEXIDINE GLUCONATE 213 G/1000ML
1 SOLUTION TOPICAL
Refills: 0 | Status: DISCONTINUED | OUTPATIENT
Start: 2021-05-24 | End: 2021-05-27

## 2021-05-24 RX ORDER — SENNA PLUS 8.6 MG/1
2 TABLET ORAL AT BEDTIME
Refills: 0 | Status: DISCONTINUED | OUTPATIENT
Start: 2021-05-24 | End: 2021-05-27

## 2021-05-24 RX ORDER — VANCOMYCIN HCL 1 G
VIAL (EA) INTRAVENOUS
Refills: 0 | Status: DISCONTINUED | OUTPATIENT
Start: 2021-05-24 | End: 2021-05-24

## 2021-05-24 RX ORDER — POLYETHYLENE GLYCOL 3350 17 G/17G
17 POWDER, FOR SOLUTION ORAL DAILY
Refills: 0 | Status: DISCONTINUED | OUTPATIENT
Start: 2021-05-24 | End: 2021-05-27

## 2021-05-24 RX ORDER — ACETAMINOPHEN 500 MG
650 TABLET ORAL EVERY 6 HOURS
Refills: 0 | Status: DISCONTINUED | OUTPATIENT
Start: 2021-05-24 | End: 2021-05-27

## 2021-05-24 RX ORDER — LISINOPRIL 2.5 MG/1
20 TABLET ORAL DAILY
Refills: 0 | Status: DISCONTINUED | OUTPATIENT
Start: 2021-05-24 | End: 2021-05-27

## 2021-05-24 RX ORDER — HYDROMORPHONE HYDROCHLORIDE 2 MG/ML
0.5 INJECTION INTRAMUSCULAR; INTRAVENOUS; SUBCUTANEOUS
Refills: 0 | Status: DISCONTINUED | OUTPATIENT
Start: 2021-05-24 | End: 2021-05-24

## 2021-05-24 RX ORDER — HEPARIN SODIUM 5000 [USP'U]/ML
5000 INJECTION INTRAVENOUS; SUBCUTANEOUS EVERY 8 HOURS
Refills: 0 | Status: DISCONTINUED | OUTPATIENT
Start: 2021-05-24 | End: 2021-05-27

## 2021-05-24 RX ORDER — OXYCODONE AND ACETAMINOPHEN 5; 325 MG/1; MG/1
1 TABLET ORAL ONCE
Refills: 0 | Status: DISCONTINUED | OUTPATIENT
Start: 2021-05-24 | End: 2021-05-24

## 2021-05-24 RX ORDER — VANCOMYCIN HCL 1 G
1250 VIAL (EA) INTRAVENOUS ONCE
Refills: 0 | Status: COMPLETED | OUTPATIENT
Start: 2021-05-24 | End: 2021-05-24

## 2021-05-24 RX ORDER — DRONABINOL 2.5 MG
2.5 CAPSULE ORAL
Refills: 0 | Status: DISCONTINUED | OUTPATIENT
Start: 2021-05-24 | End: 2021-05-27

## 2021-05-24 RX ORDER — MAGNESIUM SULFATE 500 MG/ML
2 VIAL (ML) INJECTION ONCE
Refills: 0 | Status: COMPLETED | OUTPATIENT
Start: 2021-05-24 | End: 2021-05-24

## 2021-05-24 RX ORDER — PIPERACILLIN AND TAZOBACTAM 4; .5 G/20ML; G/20ML
3.38 INJECTION, POWDER, LYOPHILIZED, FOR SOLUTION INTRAVENOUS EVERY 8 HOURS
Refills: 0 | Status: DISCONTINUED | OUTPATIENT
Start: 2021-05-24 | End: 2021-05-27

## 2021-05-24 RX ORDER — VANCOMYCIN HCL 1 G
1250 VIAL (EA) INTRAVENOUS EVERY 12 HOURS
Refills: 0 | Status: DISCONTINUED | OUTPATIENT
Start: 2021-05-25 | End: 2021-05-25

## 2021-05-24 RX ORDER — SODIUM CHLORIDE 9 MG/ML
1000 INJECTION, SOLUTION INTRAVENOUS
Refills: 0 | Status: DISCONTINUED | OUTPATIENT
Start: 2021-05-24 | End: 2021-05-24

## 2021-05-24 RX ORDER — VANCOMYCIN HCL 1 G
VIAL (EA) INTRAVENOUS
Refills: 0 | Status: DISCONTINUED | OUTPATIENT
Start: 2021-05-24 | End: 2021-05-25

## 2021-05-24 RX ORDER — VANCOMYCIN HCL 1 G
1250 VIAL (EA) INTRAVENOUS ONCE
Refills: 0 | Status: DISCONTINUED | OUTPATIENT
Start: 2021-05-24 | End: 2021-05-24

## 2021-05-24 RX ORDER — PANTOPRAZOLE SODIUM 20 MG/1
40 TABLET, DELAYED RELEASE ORAL
Refills: 0 | Status: DISCONTINUED | OUTPATIENT
Start: 2021-05-24 | End: 2021-05-27

## 2021-05-24 RX ORDER — TRAZODONE HCL 50 MG
50 TABLET ORAL AT BEDTIME
Refills: 0 | Status: DISCONTINUED | OUTPATIENT
Start: 2021-05-24 | End: 2021-05-27

## 2021-05-24 RX ADMIN — SODIUM CHLORIDE 75 MILLILITER(S): 9 INJECTION, SOLUTION INTRAVENOUS at 18:39

## 2021-05-24 RX ADMIN — Medication 166.67 MILLIGRAM(S): at 18:39

## 2021-05-24 RX ADMIN — HEPARIN SODIUM 5000 UNIT(S): 5000 INJECTION INTRAVENOUS; SUBCUTANEOUS at 22:09

## 2021-05-24 RX ADMIN — PIPERACILLIN AND TAZOBACTAM 25 GRAM(S): 4; .5 INJECTION, POWDER, LYOPHILIZED, FOR SOLUTION INTRAVENOUS at 05:20

## 2021-05-24 RX ADMIN — SENNA PLUS 2 TABLET(S): 8.6 TABLET ORAL at 22:09

## 2021-05-24 RX ADMIN — HYDROMORPHONE HYDROCHLORIDE 0.5 MILLIGRAM(S): 2 INJECTION INTRAMUSCULAR; INTRAVENOUS; SUBCUTANEOUS at 18:06

## 2021-05-24 RX ADMIN — Medication 12.5 GRAM(S): at 05:20

## 2021-05-24 RX ADMIN — PIPERACILLIN AND TAZOBACTAM 25 GRAM(S): 4; .5 INJECTION, POWDER, LYOPHILIZED, FOR SOLUTION INTRAVENOUS at 22:14

## 2021-05-24 RX ADMIN — SODIUM CHLORIDE 100 MILLILITER(S): 9 INJECTION, SOLUTION INTRAVENOUS at 00:00

## 2021-05-24 RX ADMIN — PIPERACILLIN AND TAZOBACTAM 25 GRAM(S): 4; .5 INJECTION, POWDER, LYOPHILIZED, FOR SOLUTION INTRAVENOUS at 13:49

## 2021-05-24 RX ADMIN — Medication 650 MILLIGRAM(S): at 12:15

## 2021-05-24 RX ADMIN — POLYETHYLENE GLYCOL 3350 17 GRAM(S): 17 POWDER, FOR SOLUTION ORAL at 11:43

## 2021-05-24 RX ADMIN — OXYCODONE HYDROCHLORIDE 5 MILLIGRAM(S): 5 TABLET ORAL at 09:14

## 2021-05-24 RX ADMIN — OXYCODONE HYDROCHLORIDE 5 MILLIGRAM(S): 5 TABLET ORAL at 09:45

## 2021-05-24 RX ADMIN — Medication 650 MILLIGRAM(S): at 11:43

## 2021-05-24 RX ADMIN — HEPARIN SODIUM 5000 UNIT(S): 5000 INJECTION INTRAVENOUS; SUBCUTANEOUS at 05:19

## 2021-05-24 RX ADMIN — Medication 650 MILLIGRAM(S): at 05:20

## 2021-05-24 RX ADMIN — Medication 650 MILLIGRAM(S): at 06:32

## 2021-05-24 RX ADMIN — LISINOPRIL 20 MILLIGRAM(S): 2.5 TABLET ORAL at 05:19

## 2021-05-24 RX ADMIN — Medication 62.5 MILLIMOLE(S): at 09:40

## 2021-05-24 RX ADMIN — PANTOPRAZOLE SODIUM 40 MILLIGRAM(S): 20 TABLET, DELAYED RELEASE ORAL at 05:22

## 2021-05-24 RX ADMIN — HEPARIN SODIUM 5000 UNIT(S): 5000 INJECTION INTRAVENOUS; SUBCUTANEOUS at 13:50

## 2021-05-24 RX ADMIN — Medication 2.5 MILLIGRAM(S): at 22:42

## 2021-05-24 RX ADMIN — SODIUM CHLORIDE 100 MILLILITER(S): 9 INJECTION, SOLUTION INTRAVENOUS at 08:20

## 2021-05-24 NOTE — CHART NOTE - NSCHARTNOTEFT_GEN_A_CORE
PACU ANESTHESIA ADMISSION NOTE      Procedure: Exploration, wound, chest  right breast    Incision and drainage, breast, with debridement    Irrigation and excisional debridement of musculofascial tissue  right breast      Post op diagnosis:  Necrotizing soft tissue infection        ____  Intubated  TV:______       Rate: ______      FiO2: ______    _x___  Patent Airway    _x___  Full return of protective reflexes    _x___  Full recovery from anesthesia / back to baseline status    Vitals:  T-98.1  HR: 80  BP: 175/71  RR: 18 (05-24-21 @ 15:44) (18 - 20)  SpO2: 96% (05-24-21 @ 16:03) (95% - 96%)    Mental Status:  _x___ Awake   _____ Alert   _____ Drowsy   _____ Sedated    Nausea/Vomiting:  _x___  NO       ______Yes,   See Post - Op Orders         Pain Scale (0-10):  __0___    Treatment: _x___ None    ____ See Post - Op/PCA Orders    Post - Operative Fluids:   __x__ Oral   ____ See Post - Op Orders    Plan: Discharge:   _Home       _x____Floor     _____Critical Care    _____  Other:_________________    Comments:  No anesthesia issues or complications noted.  Discharge when criteria met.

## 2021-05-24 NOTE — PHARMACOTHERAPY INTERVENTION NOTE - COMMENTS
Patient [facility]  75F w/ PMH of HTN and b/l lumpectomies (10-15 years ago, reportedly benign pathology) presented to the ED with worsening R breast pain and necrotic tissue suggustive of breast infection    Allergies: No Known Allergies    Vancomycin Day #1  Indication: Breast infection  Goal trough: 10-15 mcg/mL  ID: Yes, Dr. Vang  Other antibiotics:  piperacillin/tazobactam IVPB.. 3.375 Gram(s) IV Intermittent every 8 hours      Labs: --   CrCl (mL/minute) = 34    BUN (mg/dL) =   Blood Urea Nitrogen, Serum: 21 mg/dL (05-23-21 @ 21:00)  Blood Urea Nitrogen, Serum: 23 mg/dL (05-22-21 @ 21:21)  Blood Urea Nitrogen, Serum: 26 mg/dL (05-21-21 @ 20:40)      SCr (mg/dL) =   Creatinine, Serum: 0.7 mg/dL (05-23-21 @ 21:00)  Creatinine, Serum: 0.8 mg/dL (05-22-21 @ 21:21)  Creatinine, Serum: 0.8 mg/dL (05-21-21 @ 20:40)      WBC =   WBC Count: 15.30 K/uL (05-23-21 @ 21:00)  WBC Count: 15.13 K/uL (05-22-21 @ 21:21)  WBC Count: 12.05 K/uL (05-21-21 @ 20:40)      ESR =  Sedimentation Rate, Erythrocyte: 85 mm/Hr (05-21-21 @ 20:40)      CRP =  C-Reactive Protein, Serum: 80 mg/L (05-21-21 @ 20:40)    Tmax (ºF) =   T(C): 36.7 (05-24-21 @ 05:48), Max: 37.4 (05-22-21 @ 01:04)  T(F): 98.1 (05-24-21 @ 05:48), Max: 99.4 (05-22-21 @ 01:04)      Micro:   MRSA PCR =   MRSA PCR Result.: Negative (05-19-21 @ 10:40)    Culture History:  Blood (05/18)  Tissue (05/20)    Culture Data:  Blood: Coagulase negative Staph  Tissue: Coagulase negative Staph    PK:  Dosing weight (kg) = 63.3 (ABW)  Ke = 0.05  t ½ = 15-22  Vd = 44.31    Current Regimen: 1250 mg q24h  Estimated peak: 35  Estimated trough: 12    A/P:  Patient's blood culture is likely a contaminant result. Patient has wound cultures pending from I&D that was done 05/23. At this time Vancomycin hasn't been administered. Should vancomycin be continued, a trough would be due prior to the 05/27 dose with the current regimen.    VANC DOSE/SCHEDULE/TROUGH  Date          Time              Dose  Not administered		  		  Recommendations:  1. Follow up with wound cultures  2. Follow up with ID recommendations  	  		  		  Please call [phone]  for any questions

## 2021-05-24 NOTE — PROGRESS NOTE ADULT - ASSESSMENT
75y Female patient admitted POD 2 s/p RTOR for incision and debridement of right breast abscess, tolerated procedure well, afebrile hemodynamically stable, with the above physical exam, labs, and imaging findings.  Patient seen and examined at bedside. NAD.   Tolerating:  Diet, NPO after Midnight:      NPO Start Date: 23-May-2021,   NPO Start Time: 23:59 (05-23-21 @ 11:28)  Diet, Regular:   Arthur(7 Gm Arginine/7 Gm Glut/1.2 Gm HMB     Qty per Day:  3  Supplement Feeding Modality:  Oral  Ensure Enlive Cans or Servings Per Day:  3       Frequency:  Three Times a day (05-22-21 @ 20:22)      Plan:  - OR today  - f/u cultures  - Monitor vitals  - Monitor labs and replete as necessary  - Monitor for bowel function  - Continue Pain Medications if necessary  - Continue Antibiotics if necessary  - Encourage ambulation as tolerated  - Monitor urine output  - DVT and GI Prophylaxis  - Follow PT/Physiatry if necessary  - Contact social work/case management for necessary patient needs and dispo planning      Date/Time: 05-24-21 @ 07:54

## 2021-05-24 NOTE — PROGRESS NOTE ADULT - SUBJECTIVE AND OBJECTIVE BOX
CASSIDY NOE  75y, Female  Allergy: No Known Allergies      LOS  6d    CHIEF COMPLAINT: right breast abscess (24 May 2021 07:53)      INTERVAL EVENTS/HPI  - No acute events overnight  - T(F): , Max: 98.7 (05-23-21 @ 21:36)  - Tolerating medication  - WBC Count: 15.30 (05-23-21 @ 21:00)  WBC Count: 15.13 (05-22-21 @ 21:21)  - Creatinine, Serum: 0.7 (05-23-21 @ 21:00)  Creatinine, Serum: 0.8 (05-22-21 @ 21:21)       ROS  ***    VITALS:  T(F): 98.1, Max: 98.7 (05-23-21 @ 21:36)  HR: 96  BP: 156/70  RR: 20Vital Signs Last 24 Hrs  T(C): 36.7 (24 May 2021 08:39), Max: 37.1 (23 May 2021 21:36)  T(F): 98.1 (24 May 2021 05:48), Max: 98.7 (23 May 2021 21:36)  HR: 96 (24 May 2021 08:39) (80 - 96)  BP: 156/70 (24 May 2021 08:39) (156/70 - 186/72)  BP(mean): --  RR: 20 (24 May 2021 08:39) (18 - 20)  SpO2: --    PHYSICAL EXAM:  ***    FH: Non-contributory  Social Hx: Non-contributory    TESTS & MEASUREMENTS:                        9.3    15.30 )-----------( 449      ( 23 May 2021 21:00 )             28.7     05-23    135  |  100  |  21<H>  ----------------------------<  132<H>  4.7   |  29  |  0.7    Ca    7.7<L>      23 May 2021 21:00  Phos  2.4     05-23  Mg     1.4     05-23      eGFR if Non African American: 84 mL/min/1.73M2 (05-23-21 @ 21:00)  eGFR if : 97 mL/min/1.73M2 (05-23-21 @ 21:00)          Culture - Tissue with Gram Stain (collected 05-22-21 @ 14:00)  Source: .Tissue None  Gram Stain (05-23-21 @ 02:35):    Few polymorphonuclear leukocytes seen per low power field    Few Gram positive cocci in pairs seen per oil power field    Rare Gram Variable Rods seen per oil power field    Culture - Tissue with Gram Stain (collected 05-20-21 @ 10:44)  Source: .Tissue None  Gram Stain (05-20-21 @ 21:54):    Rare polymorphonuclear leukocytes seen per low power field    Rare Gram Variable Rods seen per oil power field    Rare Gram positive cocci in pairs seen per oil power field  Final Report (05-23-21 @ 15:38):    Growth in fluid media only Coag Negative Staphylococcus    Moderate Anaerobic Gram Negative Rocky Most closely resembling    Prevotella species "Susceptibilities not performed"  Organism: Coag Negative Staphylococcus (05-23-21 @ 15:38)  Organism: Coag Negative Staphylococcus (05-23-21 @ 15:38)      -  Ampicillin/Sulbactam: R <=8/4      -  Cefazolin: R <=4      -  Clindamycin: R >4      -  Erythromycin: R >4      -  Gentamicin: S <=1 Should not be used as monotherapy      -  Oxacillin: R >2      -  Penicillin: R >8      -  RIF- Rifampin: S <=1 Should not be used as monotherapy      -  Tetra/Doxy: S <=1      -  Trimethoprim/Sulfamethoxazole: S <=0.5/9.5      -  Vancomycin: S 2      Method Type: RA    Culture - Urine (collected 05-18-21 @ 13:17)  Source: .Urine Clean Catch (Midstream)  Final Report (05-22-21 @ 09:44):    10,000 - 49,000 CFU/mL Escherichia coli    <10,000 CFU/ml Normal Urogenital johanna present  Organism: Escherichia coli (05-22-21 @ 09:44)  Organism: Escherichia coli (05-22-21 @ 09:44)      -  Amikacin: S <=16      -  Amoxicillin/Clavulanic Acid: S <=8/4      -  Ampicillin: R >16 These ampicillin results predict results for amoxicillin      -  Ampicillin/Sulbactam: I 16/8 Enterobacter, Citrobacter, and Serratia may develop resistance during prolonged therapy (3-4 days)      -  Aztreonam: S <=4      -  Cefazolin: S <=2 (MIC_CL_COM_ENTERIC_CEFAZU) For uncomplicated UTI with K. pneumoniae, E. coli, or P. mirablis: RA <=16 is sensitive and RA >=32 is resistant. This also predicts results for oral agents cefaclor, cefdinir, cefpodoxime, cefprozil, cefuroxime axetil, cephalexin and locarbef for uncomplicated UTI. Note that some isolates may be susceptible to these agents while testing resistant to cefazolin.      -  Cefepime: S <=2      -  Cefoxitin: S <=8      -  Ceftriaxone: S <=1 Enterobacter, Citrobacter, and Serratia may develop resistance during prolonged therapy      -  Ciprofloxacin: S <=0.25      -  Ertapenem: S <=0.5      -  Gentamicin: S <=2      -  Imipenem: S <=1      -  Levofloxacin: S <=0.5      -  Meropenem: S <=1      -  Nitrofurantoin: S <=32 Should not be used to treat pyelonephritis      -  Piperacillin/Tazobactam: S <=8      -  Tigecycline: S <=2      -  Tobramycin: S <=2      -  Trimethoprim/Sulfamethoxazole: R >2/38      Method Type: RA    Culture - Blood (collected 05-18-21 @ 11:00)  Source: .Blood Blood-Peripheral  Gram Stain (05-20-21 @ 21:44):    Growth in anaerobic bottle: Gram Positive Cocci in Clusters  Final Report (05-21-21 @ 18:02):    Growth in anaerobic bottle: Staphylococcus cohnii    Coag Negative Staphylococcus    Single set isolate, possible contaminant. Contact    Microbiology if susceptibility testing clinically    indicated.    ***Blood Panel PCR results on this specimen are available    approximately 3 hours after the Gram stain result.***    Gram stain, PCR, and/or culture results may not always    correspond due to difference in methodologies.    ************************************************************    This PCR assay was performed by multiplex PCR. This    Assay tests for 66 bacterial and resistance gene targets.    Please refer to the [facility] test directory    at [email or web address] for details.  Organism: Blood Culture PCR (05-21-21 @ 18:02)  Organism: Blood Culture PCR (05-21-21 @ 18:02)      -  Coagulase negative Staphylococcus: Detec      Method Type: PCR    Culture - Blood (collected 05-18-21 @ 11:00)  Source: .Blood Blood-Peripheral  Final Report (05-23-21 @ 23:00):    No Growth Final            INFECTIOUS DISEASES TESTING  COVID-19 PCR: NotDetec (05-21-21 @ 23:12)  MRSA PCR Result.: Negative (05-19-21 @ 10:40)  COVID-19 PCR: NotDetec (05-18-21 @ 10:14)      INFLAMMATORY MARKERS  Sedimentation Rate, Erythrocyte: 85 mm/Hr (05-21-21 @ 20:40)  C-Reactive Protein, Serum: 80 mg/L (05-21-21 @ 20:40)      RADIOLOGY & ADDITIONAL TESTS:  I have personally reviewed the last available Chest xray  CXR      CT      CARDIOLOGY TESTING  12 Lead ECG:   Ventricular Rate 89 BPM    Atrial Rate 89 BPM    P-R Interval 150 ms    QRS Duration 90 ms    Q-T Interval 366 ms    QTC Calculation(Bazett) 445 ms    P Axis 75 degrees    R Axis 78 degrees    T Axis 70 degrees    Diagnosis Line Normal sinus rhythm  Normal ECG    Confirmed by KENDRA HOLT MD ([id number]) on 5/18/2021 3:22:53 PM (05-18-21 @ 14:27)  12 Lead ECG:   Ventricular Rate 92 BPM    Atrial Rate 92 BPM    P-R Interval 142 ms    QRS Duration 78 ms    Q-T Interval 358 ms    QTC Calculation(Bazett) 442 ms    P Axis 75 degrees    R Axis 65 degrees    T Axis 67 degrees    Diagnosis Line Sinus rhythm with Premature atrial complexes  ST elevation, consider early repolarization  Borderline ECG    Confirmed by KENDRA HOLT MD ([id number]) on 5/18/2021 12:58:02 PM (05-18-21 @ 11:16)      MEDICATIONS  acetaminophen   Tablet .. 650 Oral every 6 hours  chlorhexidine 4% Liquid 1 Topical <User Schedule>  dronabinol 2.5 Oral two times a day  heparin   Injectable 5000 SubCutaneous every 8 hours  lactated ringers. 1000 IV Continuous <Continuous>  lisinopril 20 Oral daily  pantoprazole    Tablet 40 Oral before breakfast  piperacillin/tazobactam IVPB.. 3.375 IV Intermittent every 8 hours  polyethylene glycol 3350 17 Oral daily  senna 2 Oral at bedtime  sodium phosphate IVPB 15 IV Intermittent once  vancomycin  IVPB 1250 IV Intermittent once  vancomycin  IVPB         WEIGHT  Weight (kg): 63.3 (05-24-21 @ 08:39)  Creatinine, Serum: 0.7 mg/dL (05-23-21 @ 21:00)      ANTIBIOTICS:  piperacillin/tazobactam IVPB.. 3.375 Gram(s) IV Intermittent every 8 hours  vancomycin  IVPB 1250 milliGRAM(s) IV Intermittent once  vancomycin  IVPB          All available historical records have been reviewed       CASSIDY NOE  75y, Female  Allergy: No Known Allergies      LOS  6d    CHIEF COMPLAINT: right breast abscess (24 May 2021 07:53)      INTERVAL EVENTS/HPI  - No acute events overnight  - T(F): , Max: 98.7 (05-23-21 @ 21:36)  - Tolerating medication  - WBC Count: 15.30 (05-23-21 @ 21:00)  WBC Count: 15.13 (05-22-21 @ 21:21)  - Creatinine, Serum: 0.7 (05-23-21 @ 21:00)  Creatinine, Serum: 0.8 (05-22-21 @ 21:21)       ROS  General: Denies rigors, nightsweats  HEENT: Denies headache, rhinorrhea, sore throat, eye pain  CV: Denies CP, palpitations  PULM: Denies wheezing, hemoptysis  GI: Denies hematemesis, hematochezia, melena  : Denies discharge, hematuria  MSK: Denies arthralgias, myalgias  SKIN: Denies rash, lesions  NEURO: Denies paresthesias, weakness  PSYCH: Denies depression, anxiety     VITALS:  T(F): 98.1, Max: 98.7 (05-23-21 @ 21:36)  HR: 96  BP: 156/70  RR: 20Vital Signs Last 24 Hrs  T(C): 36.7 (24 May 2021 08:39), Max: 37.1 (23 May 2021 21:36)  T(F): 98.1 (24 May 2021 05:48), Max: 98.7 (23 May 2021 21:36)  HR: 96 (24 May 2021 08:39) (80 - 96)  BP: 156/70 (24 May 2021 08:39) (156/70 - 186/72)  BP(mean): --  RR: 20 (24 May 2021 08:39) (18 - 20)  SpO2: --    PHYSICAL EXAM:  Gen: NAD, resting in bed  HEENT: Normocephalic, atraumatic  Neck: supple, no lymphadenopathy  CV: Regular rate & regular rhythm  R breast dressings  Lungs: decreased BS at bases, no fremitus  Abdomen: Soft, BS present  Ext: Warm, well perfused  Neuro: non focal, awake  Skin: no rash, no erythema  Lines: no phlebitis     FH: Non-contributory  Social Hx: Non-contributory    TESTS & MEASUREMENTS:                        9.3    15.30 )-----------( 449      ( 23 May 2021 21:00 )             28.7     05-23    135  |  100  |  21<H>  ----------------------------<  132<H>  4.7   |  29  |  0.7    Ca    7.7<L>      23 May 2021 21:00  Phos  2.4     05-23  Mg     1.4     05-23      eGFR if Non African American: 84 mL/min/1.73M2 (05-23-21 @ 21:00)  eGFR if : 97 mL/min/1.73M2 (05-23-21 @ 21:00)          Culture - Tissue with Gram Stain (collected 05-22-21 @ 14:00)  Source: .Tissue None  Gram Stain (05-23-21 @ 02:35):    Few polymorphonuclear leukocytes seen per low power field    Few Gram positive cocci in pairs seen per oil power field    Rare Gram Variable Rods seen per oil power field    Culture - Tissue with Gram Stain (collected 05-20-21 @ 10:44)  Source: .Tissue None  Gram Stain (05-20-21 @ 21:54):    Rare polymorphonuclear leukocytes seen per low power field    Rare Gram Variable Rods seen per oil power field    Rare Gram positive cocci in pairs seen per oil power field  Final Report (05-23-21 @ 15:38):    Growth in fluid media only Coag Negative Staphylococcus    Moderate Anaerobic Gram Negative Rocky Most closely resembling    Prevotella species "Susceptibilities not performed"  Organism: Coag Negative Staphylococcus (05-23-21 @ 15:38)  Organism: Coag Negative Staphylococcus (05-23-21 @ 15:38)      -  Ampicillin/Sulbactam: R <=8/4      -  Cefazolin: R <=4      -  Clindamycin: R >4      -  Erythromycin: R >4      -  Gentamicin: S <=1 Should not be used as monotherapy      -  Oxacillin: R >2      -  Penicillin: R >8      -  RIF- Rifampin: S <=1 Should not be used as monotherapy      -  Tetra/Doxy: S <=1      -  Trimethoprim/Sulfamethoxazole: S <=0.5/9.5      -  Vancomycin: S 2      Method Type: RA    Culture - Urine (collected 05-18-21 @ 13:17)  Source: .Urine Clean Catch (Midstream)  Final Report (05-22-21 @ 09:44):    10,000 - 49,000 CFU/mL Escherichia coli    <10,000 CFU/ml Normal Urogenital johanna present  Organism: Escherichia coli (05-22-21 @ 09:44)  Organism: Escherichia coli (05-22-21 @ 09:44)      -  Amikacin: S <=16      -  Amoxicillin/Clavulanic Acid: S <=8/4      -  Ampicillin: R >16 These ampicillin results predict results for amoxicillin      -  Ampicillin/Sulbactam: I 16/8 Enterobacter, Citrobacter, and Serratia may develop resistance during prolonged therapy (3-4 days)      -  Aztreonam: S <=4      -  Cefazolin: S <=2 (MIC_CL_COM_ENTERIC_CEFAZU) For uncomplicated UTI with K. pneumoniae, E. coli, or P. mirablis: RA <=16 is sensitive and RA >=32 is resistant. This also predicts results for oral agents cefaclor, cefdinir, cefpodoxime, cefprozil, cefuroxime axetil, cephalexin and locarbef for uncomplicated UTI. Note that some isolates may be susceptible to these agents while testing resistant to cefazolin.      -  Cefepime: S <=2      -  Cefoxitin: S <=8      -  Ceftriaxone: S <=1 Enterobacter, Citrobacter, and Serratia may develop resistance during prolonged therapy      -  Ciprofloxacin: S <=0.25      -  Ertapenem: S <=0.5      -  Gentamicin: S <=2      -  Imipenem: S <=1      -  Levofloxacin: S <=0.5      -  Meropenem: S <=1      -  Nitrofurantoin: S <=32 Should not be used to treat pyelonephritis      -  Piperacillin/Tazobactam: S <=8      -  Tigecycline: S <=2      -  Tobramycin: S <=2      -  Trimethoprim/Sulfamethoxazole: R >2/38      Method Type: RA    Culture - Blood (collected 05-18-21 @ 11:00)  Source: .Blood Blood-Peripheral  Gram Stain (05-20-21 @ 21:44):    Growth in anaerobic bottle: Gram Positive Cocci in Clusters  Final Report (05-21-21 @ 18:02):    Growth in anaerobic bottle: Staphylococcus cohnii    Coag Negative Staphylococcus    Single set isolate, possible contaminant. Contact    Microbiology if susceptibility testing clinically    indicated.    ***Blood Panel PCR results on this specimen are available    approximately 3 hours after the Gram stain result.***    Gram stain, PCR, and/or culture results may not always    correspond due to difference in methodologies.    ************************************************************    This PCR assay was performed by multiplex PCR. This    Assay tests for 66 bacterial and resistance gene targets.    Please refer to the [email or web address] test directory    at [email or web address] for details.  Organism: Blood Culture PCR (05-21-21 @ 18:02)  Organism: Blood Culture PCR (05-21-21 @ 18:02)      -  Coagulase negative Staphylococcus: Detec      Method Type: PCR    Culture - Blood (collected 05-18-21 @ 11:00)  Source: .Blood Blood-Peripheral  Final Report (05-23-21 @ 23:00):    No Growth Final            INFECTIOUS DISEASES TESTING  COVID-19 PCR: NotDetec (05-21-21 @ 23:12)  MRSA PCR Result.: Negative (05-19-21 @ 10:40)  COVID-19 PCR: NotDetec (05-18-21 @ 10:14)      INFLAMMATORY MARKERS  Sedimentation Rate, Erythrocyte: 85 mm/Hr (05-21-21 @ 20:40)  C-Reactive Protein, Serum: 80 mg/L (05-21-21 @ 20:40)      RADIOLOGY & ADDITIONAL TESTS:  I have personally reviewed the last available Chest xray  CXR      CT      CARDIOLOGY TESTING  12 Lead ECG:   Ventricular Rate 89 BPM    Atrial Rate 89 BPM    P-R Interval 150 ms    QRS Duration 90 ms    Q-T Interval 366 ms    QTC Calculation(Bazett) 445 ms    P Axis 75 degrees    R Axis 78 degrees    T Axis 70 degrees    Diagnosis Line Normal sinus rhythm  Normal ECG    Confirmed by KENDRA HOLT MD ([id number]) on 5/18/2021 3:22:53 PM (05-18-21 @ 14:27)  12 Lead ECG:   Ventricular Rate 92 BPM    Atrial Rate 92 BPM    P-R Interval 142 ms    QRS Duration 78 ms    Q-T Interval 358 ms    QTC Calculation(Bazett) 442 ms    P Axis 75 degrees    R Axis 65 degrees    T Axis 67 degrees    Diagnosis Line Sinus rhythm with Premature atrial complexes  ST elevation, consider early repolarization  Borderline ECG    Confirmed by KENDRA HOLT MD ([id number]) on 5/18/2021 12:58:02 PM (05-18-21 @ 11:16)      MEDICATIONS  acetaminophen   Tablet .. 650 Oral every 6 hours  chlorhexidine 4% Liquid 1 Topical <User Schedule>  dronabinol 2.5 Oral two times a day  heparin   Injectable 5000 SubCutaneous every 8 hours  lactated ringers. 1000 IV Continuous <Continuous>  lisinopril 20 Oral daily  pantoprazole    Tablet 40 Oral before breakfast  piperacillin/tazobactam IVPB.. 3.375 IV Intermittent every 8 hours  polyethylene glycol 3350 17 Oral daily  senna 2 Oral at bedtime  sodium phosphate IVPB 15 IV Intermittent once  vancomycin  IVPB 1250 IV Intermittent once  vancomycin  IVPB         WEIGHT  Weight (kg): 63.3 (05-24-21 @ 08:39)  Creatinine, Serum: 0.7 mg/dL (05-23-21 @ 21:00)      ANTIBIOTICS:  piperacillin/tazobactam IVPB.. 3.375 Gram(s) IV Intermittent every 8 hours  vancomycin  IVPB 1250 milliGRAM(s) IV Intermittent once  vancomycin  IVPB          All available historical records have been reviewed

## 2021-05-24 NOTE — PROGRESS NOTE ADULT - ASSESSMENT
ASSESSMENT  75F w/ PMH of HTN and b/l lumpectomies (10-15 years ago, reportedly benign pathology) presented to the ED with worsening R breast pain. Pt reports R breast biopsy last week Tuesday, 5/11. She was told to keep the dressing on for 5 days. Since the biopsy, she has been experiencing R breast pain. 2 days ago, she removed the dressing and saw erythema and small amount of necrotic tissue overlying R underside of breast. This morning the abscess ruptured and began putting out pus and necrotic tissue.    IMPRESSION  #Severe Sepsis on admission lactic acidosis due to necrotic R breast abscess    5/22 WCX pending   s/p debridement 5/22 -- necrotizing soft tissue infection    s/p OR WCX 5/20 --   Growth in fluid media only Coag Negative Staphylococcus    Moderate Anaerobic Gram Negative Rocky Most closely resembling    Prevotella species "Susceptibilities not performed"  s/p Exploration, wound, chest 20-May-2021 08:50:47 right Kristy, Michelle Y  Incision and drainage, breast, with debridement 20-May-2021 "extensive necrotizing soft tissue infection occupying more than 2/3rds of her breast, debrided to healthy tissue, packed with kerlex"    UCX   10,000 - 49,000 CFU/mL Escherichia coli (I unasyn, R bactrim)    s/p I&D exudate, purulent drainage, necrotic tissue    MRSA PCR Result.: Negative (05-19-21 @ 10:40)  #CoNS in bcx is a contaminant 1/4 bottles    5/18 1/4 bottles coNS  #Hyponatremia  Creatinine, Serum: 0.8 (05-19-21 @ 05:34)    Weight (kg): 63.3 (05-18-21 @ 16:27)    RECOMMENDATIONS  - REPEAT BCX   - D/C VANC  - f/u final WCX 5/20, 5/22  - continue zosyn 3.375 Gram(s) IV Intermittent every 8 hours     Please call with any questions or send a message on [employer] Teams  Spectra [phone]    ASSESSMENT  75F w/ PMH of HTN and b/l lumpectomies (10-15 years ago, reportedly benign pathology) presented to the ED with worsening R breast pain. Pt reports R breast biopsy last week Tuesday, 5/11. She was told to keep the dressing on for 5 days. Since the biopsy, she has been experiencing R breast pain. 2 days ago, she removed the dressing and saw erythema and small amount of necrotic tissue overlying R underside of breast. This morning the abscess ruptured and began putting out pus and necrotic tissue.    IMPRESSION  #Severe Sepsis on admission lactic acidosis due to necrotic R breast abscess    5/22 WCX pending   s/p debridement 5/22 -- necrotizing soft tissue infection    s/p OR WCX 5/20 --   Growth in fluid media only Coag Negative Staphylococcus- contaminant     Moderate Anaerobic Gram Negative Rocky Most closely resembling    Prevotella species "Susceptibilities not performed"  s/p Exploration, wound, chest 20-May-2021 08:50:47 right Michelle Wagner Y  Incision and drainage, breast, with debridement 20-May-2021 "extensive necrotizing soft tissue infection occupying more than 2/3rds of her breast, debrided to healthy tissue, packed with kerlex"    UCX   10,000 - 49,000 CFU/mL Escherichia coli (I unasyn, R bactrim)    s/p I&D exudate, purulent drainage, necrotic tissue    MRSA PCR Result.: Negative (05-19-21 @ 10:40)  #CoNS in bcx is a contaminant 1/4 bottles    5/18 1/4 bottles coNS  #Hyponatremia  Creatinine, Serum: 0.8 (05-19-21 @ 05:34)    Weight (kg): 63.3 (05-18-21 @ 16:27)    RECOMMENDATIONS  - REPEAT BCX   - D/C VANC, coNS is a likely contaminant as only in 1 BCX bottle and only in growth media from wound  - f/u final WCX 5/20, 5/22  - continue zosyn 3.375 Gram(s) IV Intermittent every 8 hours     Please call with any questions or send a message on [employer] Teams  Spectra [phone]

## 2021-05-24 NOTE — PROGRESS NOTE ADULT - SUBJECTIVE AND OBJECTIVE BOX
Progress Note: General Surgery  Patient: CASSIDY NOE , 75y (1945)Female   MRN: [id number]  Location: [address]  Visit: 05-18-21 Inpatient  Date: 05-24-21 @ 07:54    Admit Diagnosis/Chief Complaint: Necrotizing soft tissue infection        Procedure/Diagnosis: Necrotizing soft tissue infection     S/P Exploration, wound, chest    Incision and drainage, breast, with debridement    Irrigation and excisional debridement of musculofascial tissue        Events/ 24h: Patient seen and examined at bedside. No acute events overnight. Afebrile, VSS.    Vitals: T(F): 98.1 (05-24-21 @ 05:48), Max: 98.7 (05-23-21 @ 21:36)  HR: 96 (05-24-21 @ 05:48)  BP: 156/70 (05-24-21 @ 06:15) (148/67 - 186/72)  RR: 20 (05-24-21 @ 05:48)  SpO2: --    In:   05-23-21 @ 07:01  -  05-24-21 @ 07:00  --------------------------------------------------------  IN: 800 mL    05-24-21 @ 07:01  -  05-24-21 @ 07:54  --------------------------------------------------------  IN: 0 mL      Out:   05-23-21 @ 07:01  -  05-24-21 @ 07:00  --------------------------------------------------------  OUT:    Voided (mL): 1600 mL  Total OUT: 1600 mL      05-24-21 @ 07:01  -  05-24-21 @ 07:54  --------------------------------------------------------  OUT:    Voided (mL): 200 mL  Total OUT: 200 mL        Net:   05-23-21 @ 07:01  -  05-24-21 @ 07:00  --------------------------------------------------------  NET: -800 mL    05-24-21 @ 07:01  -  05-24-21 @ 07:54  --------------------------------------------------------  NET: -200 mL        Diet: Diet, NPO after Midnight:      NPO Start Date: 23-May-2021,   NPO Start Time: 23:59 (05-23-21 @ 11:28)  Diet, Regular:   Arthur(7 Gm Arginine/7 Gm Glut/1.2 Gm HMB     Qty per Day:  3  Supplement Feeding Modality:  Oral  Ensure Enlive Cans or Servings Per Day:  3       Frequency:  Three Times a day (05-22-21 @ 20:22)    IV Fluids: lactated ringers. 1000 milliLiter(s) (100 mL/Hr) IV Continuous <Continuous>  sodium phosphate IVPB 15 milliMole(s) IV Intermittent once      Physical Examination:  General Appearance: NAD   HEENT: EOMI, sclera anicteric.  Heart: RRR   Lungs: Symmetric chest wall expansion, equal rise and fall.  Abdomen:  Soft, nontender, nondistended.   MSK/Extremities: Warm & well-perfused.   Skin: Warm, dry. No jaundice.   breast: right breast abscess cavity dressed and packed. tender      Medications: [Standing]  acetaminophen   Tablet .. 650 milliGRAM(s) Oral every 6 hours  chlorhexidine 4% Liquid 1 Application(s) Topical <User Schedule>  dronabinol 2.5 milliGRAM(s) Oral two times a day  heparin   Injectable 5000 Unit(s) SubCutaneous every 8 hours  lactated ringers. 1000 milliLiter(s) (100 mL/Hr) IV Continuous <Continuous>  lisinopril 20 milliGRAM(s) Oral daily  pantoprazole    Tablet 40 milliGRAM(s) Oral before breakfast  piperacillin/tazobactam IVPB.. 3.375 Gram(s) IV Intermittent every 8 hours  polyethylene glycol 3350 17 Gram(s) Oral daily  senna 2 Tablet(s) Oral at bedtime  sodium phosphate IVPB 15 milliMole(s) IV Intermittent once  vancomycin  IVPB        DVT Prophylaxis: heparin   Injectable 5000 Unit(s) SubCutaneous every 8 hours    GI Prophylaxis: pantoprazole    Tablet 40 milliGRAM(s) Oral before breakfast    Antibiotics: piperacillin/tazobactam IVPB.. 3.375 Gram(s) IV Intermittent every 8 hours  vancomycin  IVPB        Anticoagulation:   Medications:[PRN]  ketorolac   Injectable 15 milliGRAM(s) IV Push every 8 hours PRN  oxyCODONE    IR 5 milliGRAM(s) Oral every 6 hours PRN  traZODone 50 milliGRAM(s) Oral at bedtime PRN      Labs:                        9.3    15.30 )-----------( 449      ( 23 May 2021 21:00 )             28.7     05-23    135  |  100  |  21<H>  ----------------------------<  132<H>  4.7   |  29  |  0.7    Ca    7.7<L>      23 May 2021 21:00  Phos  2.4     05-23  Mg     1.4     05-23        PT/INR - ( 23 May 2021 21:00 )   PT: 11.90 sec;   INR: 1.03 ratio         PTT - ( 23 May 2021 21:00 )  PTT:23.9 sec          Urine/Micro:    Culture - Tissue with Gram Stain (collected 22 May 2021 14:00)  Source: .Tissue None  Gram Stain (23 May 2021 02:35):    Few polymorphonuclear leukocytes seen per low power field    Few Gram positive cocci in pairs seen per oil power field    Rare Gram Variable Rods seen per oil power field          Imaging:

## 2021-05-25 ENCOUNTER — TRANSCRIPTION ENCOUNTER (OUTPATIENT)
Age: 76
End: 2021-05-25

## 2021-05-25 LAB
ANION GAP SERPL CALC-SCNC: 8 MMOL/L — SIGNIFICANT CHANGE UP (ref 7–14)
BASOPHILS # BLD AUTO: 0.03 K/UL — SIGNIFICANT CHANGE UP (ref 0–0.2)
BASOPHILS NFR BLD AUTO: 0.2 % — SIGNIFICANT CHANGE UP (ref 0–1)
BUN SERPL-MCNC: 13 MG/DL — SIGNIFICANT CHANGE UP (ref 10–20)
CALCIUM SERPL-MCNC: 7.5 MG/DL — LOW (ref 8.5–10.1)
CHLORIDE SERPL-SCNC: 102 MMOL/L — SIGNIFICANT CHANGE UP (ref 98–110)
CO2 SERPL-SCNC: 26 MMOL/L — SIGNIFICANT CHANGE UP (ref 17–32)
CREAT SERPL-MCNC: 0.8 MG/DL — SIGNIFICANT CHANGE UP (ref 0.7–1.5)
EOSINOPHIL # BLD AUTO: 0.13 K/UL — SIGNIFICANT CHANGE UP (ref 0–0.7)
EOSINOPHIL NFR BLD AUTO: 1 % — SIGNIFICANT CHANGE UP (ref 0–8)
GLUCOSE BLDC GLUCOMTR-MCNC: 137 MG/DL — HIGH (ref 70–99)
GLUCOSE BLDC GLUCOMTR-MCNC: 142 MG/DL — HIGH (ref 70–99)
GLUCOSE BLDC GLUCOMTR-MCNC: 167 MG/DL — HIGH (ref 70–99)
GLUCOSE BLDC GLUCOMTR-MCNC: 188 MG/DL — HIGH (ref 70–99)
GLUCOSE SERPL-MCNC: 181 MG/DL — HIGH (ref 70–99)
HCT VFR BLD CALC: 26.2 % — LOW (ref 37–47)
HGB BLD-MCNC: 8.2 G/DL — LOW (ref 12–16)
IMM GRANULOCYTES NFR BLD AUTO: 1.1 % — HIGH (ref 0.1–0.3)
LYMPHOCYTES # BLD AUTO: 1.38 K/UL — SIGNIFICANT CHANGE UP (ref 1.2–3.4)
LYMPHOCYTES # BLD AUTO: 10.1 % — LOW (ref 20.5–51.1)
MAGNESIUM SERPL-MCNC: 1.8 MG/DL — SIGNIFICANT CHANGE UP (ref 1.8–2.4)
MCHC RBC-ENTMCNC: 26.8 PG — LOW (ref 27–31)
MCHC RBC-ENTMCNC: 31.3 G/DL — LOW (ref 32–37)
MCV RBC AUTO: 85.6 FL — SIGNIFICANT CHANGE UP (ref 81–99)
MONOCYTES # BLD AUTO: 0.75 K/UL — HIGH (ref 0.1–0.6)
MONOCYTES NFR BLD AUTO: 5.5 % — SIGNIFICANT CHANGE UP (ref 1.7–9.3)
NEUTROPHILS # BLD AUTO: 11.22 K/UL — HIGH (ref 1.4–6.5)
NEUTROPHILS NFR BLD AUTO: 82.1 % — HIGH (ref 42.2–75.2)
NRBC # BLD: 0 /100 WBCS — SIGNIFICANT CHANGE UP (ref 0–0)
PHOSPHATE SERPL-MCNC: 2.4 MG/DL — SIGNIFICANT CHANGE UP (ref 2.1–4.9)
PLATELET # BLD AUTO: 537 K/UL — HIGH (ref 130–400)
POTASSIUM SERPL-MCNC: 3.9 MMOL/L — SIGNIFICANT CHANGE UP (ref 3.5–5)
POTASSIUM SERPL-SCNC: 3.9 MMOL/L — SIGNIFICANT CHANGE UP (ref 3.5–5)
RBC # BLD: 3.06 M/UL — LOW (ref 4.2–5.4)
RBC # FLD: 14.6 % — HIGH (ref 11.5–14.5)
SODIUM SERPL-SCNC: 136 MMOL/L — SIGNIFICANT CHANGE UP (ref 135–146)
WBC # BLD: 13.66 K/UL — HIGH (ref 4.8–10.8)
WBC # FLD AUTO: 13.66 K/UL — HIGH (ref 4.8–10.8)

## 2021-05-25 PROCEDURE — 99223 1ST HOSP IP/OBS HIGH 75: CPT

## 2021-05-25 PROCEDURE — 71260 CT THORAX DX C+: CPT | Mod: 26

## 2021-05-25 RX ORDER — SODIUM CHLORIDE 9 MG/ML
1000 INJECTION, SOLUTION INTRAVENOUS
Refills: 0 | Status: DISCONTINUED | OUTPATIENT
Start: 2021-05-25 | End: 2021-05-26

## 2021-05-25 RX ORDER — MAGNESIUM SULFATE 500 MG/ML
2 VIAL (ML) INJECTION
Refills: 0 | Status: COMPLETED | OUTPATIENT
Start: 2021-05-25 | End: 2021-05-25

## 2021-05-25 RX ORDER — MORPHINE SULFATE 50 MG/1
2 CAPSULE, EXTENDED RELEASE ORAL ONCE
Refills: 0 | Status: DISCONTINUED | OUTPATIENT
Start: 2021-05-25 | End: 2021-05-25

## 2021-05-25 RX ORDER — MAGNESIUM SULFATE 500 MG/ML
2 VIAL (ML) INJECTION ONCE
Refills: 0 | Status: COMPLETED | OUTPATIENT
Start: 2021-05-25 | End: 2021-05-25

## 2021-05-25 RX ADMIN — CHLORHEXIDINE GLUCONATE 1 APPLICATION(S): 213 SOLUTION TOPICAL at 05:40

## 2021-05-25 RX ADMIN — SENNA PLUS 2 TABLET(S): 8.6 TABLET ORAL at 21:23

## 2021-05-25 RX ADMIN — Medication 650 MILLIGRAM(S): at 05:40

## 2021-05-25 RX ADMIN — Medication 650 MILLIGRAM(S): at 00:33

## 2021-05-25 RX ADMIN — Medication 50 GRAM(S): at 05:38

## 2021-05-25 RX ADMIN — HEPARIN SODIUM 5000 UNIT(S): 5000 INJECTION INTRAVENOUS; SUBCUTANEOUS at 16:26

## 2021-05-25 RX ADMIN — Medication 650 MILLIGRAM(S): at 18:51

## 2021-05-25 RX ADMIN — Medication 650 MILLIGRAM(S): at 23:40

## 2021-05-25 RX ADMIN — POLYETHYLENE GLYCOL 3350 17 GRAM(S): 17 POWDER, FOR SOLUTION ORAL at 12:00

## 2021-05-25 RX ADMIN — PIPERACILLIN AND TAZOBACTAM 25 GRAM(S): 4; .5 INJECTION, POWDER, LYOPHILIZED, FOR SOLUTION INTRAVENOUS at 06:43

## 2021-05-25 RX ADMIN — HEPARIN SODIUM 5000 UNIT(S): 5000 INJECTION INTRAVENOUS; SUBCUTANEOUS at 05:39

## 2021-05-25 RX ADMIN — Medication 50 GRAM(S): at 03:45

## 2021-05-25 RX ADMIN — MORPHINE SULFATE 2 MILLIGRAM(S): 50 CAPSULE, EXTENDED RELEASE ORAL at 12:30

## 2021-05-25 RX ADMIN — HEPARIN SODIUM 5000 UNIT(S): 5000 INJECTION INTRAVENOUS; SUBCUTANEOUS at 21:23

## 2021-05-25 RX ADMIN — Medication 650 MILLIGRAM(S): at 12:01

## 2021-05-25 RX ADMIN — Medication 50 MILLIGRAM(S): at 21:23

## 2021-05-25 RX ADMIN — PIPERACILLIN AND TAZOBACTAM 25 GRAM(S): 4; .5 INJECTION, POWDER, LYOPHILIZED, FOR SOLUTION INTRAVENOUS at 21:23

## 2021-05-25 RX ADMIN — OXYCODONE HYDROCHLORIDE 5 MILLIGRAM(S): 5 TABLET ORAL at 05:38

## 2021-05-25 RX ADMIN — PANTOPRAZOLE SODIUM 40 MILLIGRAM(S): 20 TABLET, DELAYED RELEASE ORAL at 05:41

## 2021-05-25 RX ADMIN — PIPERACILLIN AND TAZOBACTAM 25 GRAM(S): 4; .5 INJECTION, POWDER, LYOPHILIZED, FOR SOLUTION INTRAVENOUS at 16:25

## 2021-05-25 RX ADMIN — Medication 166.67 MILLIGRAM(S): at 06:42

## 2021-05-25 RX ADMIN — Medication 50 GRAM(S): at 04:53

## 2021-05-25 RX ADMIN — Medication 16.67 GRAM(S): at 23:40

## 2021-05-25 RX ADMIN — LISINOPRIL 20 MILLIGRAM(S): 2.5 TABLET ORAL at 05:40

## 2021-05-25 RX ADMIN — Medication 650 MILLIGRAM(S): at 00:35

## 2021-05-25 RX ADMIN — MORPHINE SULFATE 2 MILLIGRAM(S): 50 CAPSULE, EXTENDED RELEASE ORAL at 12:00

## 2021-05-25 RX ADMIN — Medication 2.5 MILLIGRAM(S): at 06:43

## 2021-05-25 NOTE — PROGRESS NOTE ADULT - SUBJECTIVE AND OBJECTIVE BOX
CASSIDY NOE  75y, Female  Allergy: No Known Allergies      LOS  7d    CHIEF COMPLAINT: right breast abscess (25 May 2021 00:01)      INTERVAL EVENTS/HPI  - No acute events overnight, pending WBC  - T(F): , Max: 99.8 (05-25-21 @ 05:22)  - Tolerating medication  - WBC Count: 15.58 (05-24-21 @ 21:30)  WBC Count: 15.30 (05-23-21 @ 21:00)     - Creatinine, Serum: 0.7 (05-24-21 @ 21:30)  Creatinine, Serum: 0.7 (05-23-21 @ 21:00)       ROS  ***    VITALS:  T(F): 99.8, Max: 99.8 (05-25-21 @ 05:22)  HR: 94  BP: 155/70  RR: 18Vital Signs Last 24 Hrs  T(C): 37.7 (25 May 2021 05:22), Max: 37.7 (25 May 2021 05:22)  T(F): 99.8 (25 May 2021 05:22), Max: 99.8 (25 May 2021 05:22)  HR: 94 (25 May 2021 05:22) (73 - 96)  BP: 155/70 (25 May 2021 05:22) (137/63 - 179/76)  BP(mean): 102 (24 May 2021 15:34) (102 - 102)  RR: 18 (25 May 2021 05:22) (16 - 20)  SpO2: 96% (24 May 2021 18:15) (95% - 96%)    PHYSICAL EXAM:  ***    FH: Non-contributory  Social Hx: Non-contributory    TESTS & MEASUREMENTS:                        8.1    15.58 )-----------( 432      ( 24 May 2021 21:30 )             25.6     05-24    136  |  99  |  13  ----------------------------<  187<H>  4.0   |  24  |  0.7    Ca    7.6<L>      24 May 2021 21:30  Phos  3.3     05-24  Mg     1.4     05-24      eGFR if Non African American: 84 mL/min/1.73M2 (05-24-21 @ 21:30)  eGFR if : 97 mL/min/1.73M2 (05-24-21 @ 21:30)          Culture - Tissue with Gram Stain (collected 05-22-21 @ 14:00)  Source: .Tissue None  Gram Stain (05-23-21 @ 02:35):    Few polymorphonuclear leukocytes seen per low power field    Few Gram positive cocci in pairs seen per oil power field    Rare Gram Variable Rods seen per oil power field    Culture - Tissue with Gram Stain (collected 05-20-21 @ 10:44)  Source: .Tissue None  Gram Stain (05-20-21 @ 21:54):    Rare polymorphonuclear leukocytes seen per low power field    Rare Gram Variable Rods seen per oil power field    Rare Gram positive cocci in pairs seen per oil power field  Final Report (05-23-21 @ 15:38):    Growth in fluid media only Coag Negative Staphylococcus    Moderate Anaerobic Gram Negative Rocky Most closely resembling    Prevotella species "Susceptibilities not performed"  Organism: Coag Negative Staphylococcus (05-23-21 @ 15:38)  Organism: Coag Negative Staphylococcus (05-23-21 @ 15:38)      -  Ampicillin/Sulbactam: R <=8/4      -  Cefazolin: R <=4      -  Clindamycin: R >4      -  Erythromycin: R >4      -  Gentamicin: S <=1 Should not be used as monotherapy      -  Oxacillin: R >2      -  Penicillin: R >8      -  RIF- Rifampin: S <=1 Should not be used as monotherapy      -  Tetra/Doxy: S <=1      -  Trimethoprim/Sulfamethoxazole: S <=0.5/9.5      -  Vancomycin: S 2      Method Type: RA    Culture - Urine (collected 05-18-21 @ 13:17)  Source: .Urine Clean Catch (Midstream)  Final Report (05-22-21 @ 09:44):    10,000 - 49,000 CFU/mL Escherichia coli    <10,000 CFU/ml Normal Urogenital johanna present  Organism: Escherichia coli (05-22-21 @ 09:44)  Organism: Escherichia coli (05-22-21 @ 09:44)      -  Amikacin: S <=16      -  Amoxicillin/Clavulanic Acid: S <=8/4      -  Ampicillin: R >16 These ampicillin results predict results for amoxicillin      -  Ampicillin/Sulbactam: I 16/8 Enterobacter, Citrobacter, and Serratia may develop resistance during prolonged therapy (3-4 days)      -  Aztreonam: S <=4      -  Cefazolin: S <=2 (MIC_CL_COM_ENTERIC_CEFAZU) For uncomplicated UTI with K. pneumoniae, E. coli, or P. mirablis: RA <=16 is sensitive and RA >=32 is resistant. This also predicts results for oral agents cefaclor, cefdinir, cefpodoxime, cefprozil, cefuroxime axetil, cephalexin and locarbef for uncomplicated UTI. Note that some isolates may be susceptible to these agents while testing resistant to cefazolin.      -  Cefepime: S <=2      -  Cefoxitin: S <=8      -  Ceftriaxone: S <=1 Enterobacter, Citrobacter, and Serratia may develop resistance during prolonged therapy      -  Ciprofloxacin: S <=0.25      -  Ertapenem: S <=0.5      -  Gentamicin: S <=2      -  Imipenem: S <=1      -  Levofloxacin: S <=0.5      -  Meropenem: S <=1      -  Nitrofurantoin: S <=32 Should not be used to treat pyelonephritis      -  Piperacillin/Tazobactam: S <=8      -  Tigecycline: S <=2      -  Tobramycin: S <=2      -  Trimethoprim/Sulfamethoxazole: R >2/38      Method Type: RA    Culture - Blood (collected 05-18-21 @ 11:00)  Source: .Blood Blood-Peripheral  Gram Stain (05-20-21 @ 21:44):    Growth in anaerobic bottle: Gram Positive Cocci in Clusters  Final Report (05-21-21 @ 18:02):    Growth in anaerobic bottle: Staphylococcus cohnii    Coag Negative Staphylococcus    Single set isolate, possible contaminant. Contact    Microbiology if susceptibility testing clinically    indicated.    ***Blood Panel PCR results on this specimen are available    approximately 3 hours after the Gram stain result.***    Gram stain, PCR, and/or culture results may not always    correspond due to difference in methodologies.    ************************************************************    This PCR assay was performed by multiplex PCR. This    Assay tests for 66 bacterial and resistance gene targets.    Please refer to the [facility] [employer] test directory    at [email or web address] for details.  Organism: Blood Culture PCR (05-21-21 @ 18:02)  Organism: Blood Culture PCR (05-21-21 @ 18:02)      -  Coagulase negative Staphylococcus: Detec      Method Type: PCR    Culture - Blood (collected 05-18-21 @ 11:00)  Source: .Blood Blood-Peripheral  Final Report (05-23-21 @ 23:00):    No Growth Final            INFECTIOUS DISEASES TESTING  COVID-19 PCR: NotDetec (05-23-21 @ 21:33)  COVID-19 PCR: NotDetec (05-21-21 @ 23:12)  MRSA PCR Result.: Negative (05-19-21 @ 10:40)  COVID-19 PCR: NotDetec (05-18-21 @ 10:14)      INFLAMMATORY MARKERS  Sedimentation Rate, Erythrocyte: 85 mm/Hr (05-21-21 @ 20:40)  C-Reactive Protein, Serum: 80 mg/L (05-21-21 @ 20:40)      RADIOLOGY & ADDITIONAL TESTS:  I have personally reviewed the last available Chest xray  CXR      CT      CARDIOLOGY TESTING  12 Lead ECG:   Ventricular Rate 89 BPM    Atrial Rate 89 BPM    P-R Interval 150 ms    QRS Duration 90 ms    Q-T Interval 366 ms    QTC Calculation(Bazett) 445 ms    P Axis 75 degrees    R Axis 78 degrees    T Axis 70 degrees    Diagnosis Line Normal sinus rhythm  Normal ECG    Confirmed by KENDRA HOLT MD ([id number]) on 5/18/2021 3:22:53 PM (05-18-21 @ 14:27)  12 Lead ECG:   Ventricular Rate 92 BPM    Atrial Rate 92 BPM    P-R Interval 142 ms    QRS Duration 78 ms    Q-T Interval 358 ms    QTC Calculation(Bazett) 442 ms    P Axis 75 degrees    R Axis 65 degrees    T Axis 67 degrees    Diagnosis Line Sinus rhythm with Premature atrial complexes  ST elevation, consider early repolarization  Borderline ECG    Confirmed by KENDRA HOLT MD ([id number]) on 5/18/2021 12:58:02 PM (05-18-21 @ 11:16)      MEDICATIONS  acetaminophen   Tablet .. 650 Oral every 6 hours  chlorhexidine 4% Liquid 1 Topical <User Schedule>  dronabinol 2.5 Oral two times a day  heparin   Injectable 5000 SubCutaneous every 8 hours  lisinopril 20 Oral daily  pantoprazole    Tablet 40 Oral before breakfast  piperacillin/tazobactam IVPB.. 3.375 IV Intermittent every 8 hours  polyethylene glycol 3350 17 Oral daily  senna 2 Oral at bedtime  vancomycin  IVPB     vancomycin  IVPB 1250 IV Intermittent every 12 hours      WEIGHT  Weight (kg): 63.3 (05-24-21 @ 15:44)  Creatinine, Serum: 0.7 mg/dL (05-24-21 @ 21:30)      ANTIBIOTICS:  piperacillin/tazobactam IVPB.. 3.375 Gram(s) IV Intermittent every 8 hours  vancomycin  IVPB      vancomycin  IVPB 1250 milliGRAM(s) IV Intermittent every 12 hours      All available historical records have been reviewed       CASSIDY NOE  75y, Female  Allergy: No Known Allergies      LOS  7d    CHIEF COMPLAINT: right breast abscess (25 May 2021 00:01)      INTERVAL EVENTS/HPI  - No acute events overnight, pending WBC  - T(F): , Max: 99.8 (05-25-21 @ 05:22)  - Tolerating medication  - WBC Count: 15.58 (05-24-21 @ 21:30)  WBC Count: 15.30 (05-23-21 @ 21:00)     - Creatinine, Serum: 0.7 (05-24-21 @ 21:30)  Creatinine, Serum: 0.7 (05-23-21 @ 21:00)       ROS  General: Denies rigors, nightsweats  HEENT: Denies headache, rhinorrhea, sore throat, eye pain  CV: Denies CP, palpitations  PULM: Denies wheezing, hemoptysis  GI: Denies hematemesis, hematochezia, melena  : Denies discharge, hematuria  MSK: Denies arthralgias, myalgias  SKIN: Denies rash, lesions  NEURO: Denies paresthesias, weakness  PSYCH: Denies depression, anxiety     VITALS:  T(F): 99.8, Max: 99.8 (05-25-21 @ 05:22)  HR: 94  BP: 155/70  RR: 18Vital Signs Last 24 Hrs  T(C): 37.7 (25 May 2021 05:22), Max: 37.7 (25 May 2021 05:22)  T(F): 99.8 (25 May 2021 05:22), Max: 99.8 (25 May 2021 05:22)  HR: 94 (25 May 2021 05:22) (73 - 96)  BP: 155/70 (25 May 2021 05:22) (137/63 - 179/76)  BP(mean): 102 (24 May 2021 15:34) (102 - 102)  RR: 18 (25 May 2021 05:22) (16 - 20)  SpO2: 96% (24 May 2021 18:15) (95% - 96%)    PHYSICAL EXAM:  Gen: NAD, resting in bed  HEENT: Normocephalic, atraumatic  Neck: supple, no lymphadenopathy  CV: Regular rate & regular rhythm  R breast vac  Lungs: decreased BS at bases, no fremitus  Abdomen: Soft, BS present  Ext: Warm, well perfused  Neuro: non focal, awake  Skin: no rash, no erythema  Lines: no phlebitis     FH: Non-contributory  Social Hx: Non-contributory    TESTS & MEASUREMENTS:                        8.1    15.58 )-----------( 432      ( 24 May 2021 21:30 )             25.6     05-24    136  |  99  |  13  ----------------------------<  187<H>  4.0   |  24  |  0.7    Ca    7.6<L>      24 May 2021 21:30  Phos  3.3     05-24  Mg     1.4     05-24      eGFR if Non African American: 84 mL/min/1.73M2 (05-24-21 @ 21:30)  eGFR if : 97 mL/min/1.73M2 (05-24-21 @ 21:30)          Culture - Tissue with Gram Stain (collected 05-22-21 @ 14:00)  Source: .Tissue None  Gram Stain (05-23-21 @ 02:35):    Few polymorphonuclear leukocytes seen per low power field    Few Gram positive cocci in pairs seen per oil power field    Rare Gram Variable Rods seen per oil power field    Culture - Tissue with Gram Stain (collected 05-20-21 @ 10:44)  Source: .Tissue None  Gram Stain (05-20-21 @ 21:54):    Rare polymorphonuclear leukocytes seen per low power field    Rare Gram Variable Rods seen per oil power field    Rare Gram positive cocci in pairs seen per oil power field  Final Report (05-23-21 @ 15:38):    Growth in fluid media only Coag Negative Staphylococcus    Moderate Anaerobic Gram Negative Rocky Most closely resembling    Prevotella species "Susceptibilities not performed"  Organism: Coag Negative Staphylococcus (05-23-21 @ 15:38)  Organism: Coag Negative Staphylococcus (05-23-21 @ 15:38)      -  Ampicillin/Sulbactam: R <=8/4      -  Cefazolin: R <=4      -  Clindamycin: R >4      -  Erythromycin: R >4      -  Gentamicin: S <=1 Should not be used as monotherapy      -  Oxacillin: R >2      -  Penicillin: R >8      -  RIF- Rifampin: S <=1 Should not be used as monotherapy      -  Tetra/Doxy: S <=1      -  Trimethoprim/Sulfamethoxazole: S <=0.5/9.5      -  Vancomycin: S 2      Method Type: RA    Culture - Urine (collected 05-18-21 @ 13:17)  Source: .Urine Clean Catch (Midstream)  Final Report (05-22-21 @ 09:44):    10,000 - 49,000 CFU/mL Escherichia coli    <10,000 CFU/ml Normal Urogenital johanna present  Organism: Escherichia coli (05-22-21 @ 09:44)  Organism: Escherichia coli (05-22-21 @ 09:44)      -  Amikacin: S <=16      -  Amoxicillin/Clavulanic Acid: S <=8/4      -  Ampicillin: R >16 These ampicillin results predict results for amoxicillin      -  Ampicillin/Sulbactam: I 16/8 Enterobacter, Citrobacter, and Serratia may develop resistance during prolonged therapy (3-4 days)      -  Aztreonam: S <=4      -  Cefazolin: S <=2 (MIC_CL_COM_ENTERIC_CEFAZU) For uncomplicated UTI with K. pneumoniae, E. coli, or P. mirablis: RA <=16 is sensitive and RA >=32 is resistant. This also predicts results for oral agents cefaclor, cefdinir, cefpodoxime, cefprozil, cefuroxime axetil, cephalexin and locarbef for uncomplicated UTI. Note that some isolates may be susceptible to these agents while testing resistant to cefazolin.      -  Cefepime: S <=2      -  Cefoxitin: S <=8      -  Ceftriaxone: S <=1 Enterobacter, Citrobacter, and Serratia may develop resistance during prolonged therapy      -  Ciprofloxacin: S <=0.25      -  Ertapenem: S <=0.5      -  Gentamicin: S <=2      -  Imipenem: S <=1      -  Levofloxacin: S <=0.5      -  Meropenem: S <=1      -  Nitrofurantoin: S <=32 Should not be used to treat pyelonephritis      -  Piperacillin/Tazobactam: S <=8      -  Tigecycline: S <=2      -  Tobramycin: S <=2      -  Trimethoprim/Sulfamethoxazole: R >2/38      Method Type: RA    Culture - Blood (collected 05-18-21 @ 11:00)  Source: .Blood Blood-Peripheral  Gram Stain (05-20-21 @ 21:44):    Growth in anaerobic bottle: Gram Positive Cocci in Clusters  Final Report (05-21-21 @ 18:02):    Growth in anaerobic bottle: Staphylococcus cohnii    Coag Negative Staphylococcus    Single set isolate, possible contaminant. Contact    Microbiology if susceptibility testing clinically    indicated.    ***Blood Panel PCR results on this specimen are available    approximately 3 hours after the Gram stain result.***    Gram stain, PCR, and/or culture results may not always    correspond due to difference in methodologies.    ************************************************************    This PCR assay was performed by multiplex PCR. This    Assay tests for 66 bacterial and resistance gene targets.    Please refer to the [facility] [employer] test directory    at [email or web address] for details.  Organism: Blood Culture PCR (05-21-21 @ 18:02)  Organism: Blood Culture PCR (05-21-21 @ 18:02)      -  Coagulase negative Staphylococcus: Detec      Method Type: PCR    Culture - Blood (collected 05-18-21 @ 11:00)  Source: .Blood Blood-Peripheral  Final Report (05-23-21 @ 23:00):    No Growth Final            INFECTIOUS DISEASES TESTING  COVID-19 PCR: NotDetec (05-23-21 @ 21:33)  COVID-19 PCR: NotDetec (05-21-21 @ 23:12)  MRSA PCR Result.: Negative (05-19-21 @ 10:40)  COVID-19 PCR: NotDetec (05-18-21 @ 10:14)      INFLAMMATORY MARKERS  Sedimentation Rate, Erythrocyte: 85 mm/Hr (05-21-21 @ 20:40)  C-Reactive Protein, Serum: 80 mg/L (05-21-21 @ 20:40)      RADIOLOGY & ADDITIONAL TESTS:  I have personally reviewed the last available Chest xray  CXR      CT      CARDIOLOGY TESTING  12 Lead ECG:   Ventricular Rate 89 BPM    Atrial Rate 89 BPM    P-R Interval 150 ms    QRS Duration 90 ms    Q-T Interval 366 ms    QTC Calculation(Bazett) 445 ms    P Axis 75 degrees    R Axis 78 degrees    T Axis 70 degrees    Diagnosis Line Normal sinus rhythm  Normal ECG    Confirmed by KENDRA HOLT MD ([id number]) on 5/18/2021 3:22:53 PM (05-18-21 @ 14:27)  12 Lead ECG:   Ventricular Rate 92 BPM    Atrial Rate 92 BPM    P-R Interval 142 ms    QRS Duration 78 ms    Q-T Interval 358 ms    QTC Calculation(Bazett) 442 ms    P Axis 75 degrees    R Axis 65 degrees    T Axis 67 degrees    Diagnosis Line Sinus rhythm with Premature atrial complexes  ST elevation, consider early repolarization  Borderline ECG    Confirmed by KENDRA HOLT MD ([id number]) on 5/18/2021 12:58:02 PM (05-18-21 @ 11:16)      MEDICATIONS  acetaminophen   Tablet .. 650 Oral every 6 hours  chlorhexidine 4% Liquid 1 Topical <User Schedule>  dronabinol 2.5 Oral two times a day  heparin   Injectable 5000 SubCutaneous every 8 hours  lisinopril 20 Oral daily  pantoprazole    Tablet 40 Oral before breakfast  piperacillin/tazobactam IVPB.. 3.375 IV Intermittent every 8 hours  polyethylene glycol 3350 17 Oral daily  senna 2 Oral at bedtime  vancomycin  IVPB     vancomycin  IVPB 1250 IV Intermittent every 12 hours      WEIGHT  Weight (kg): 63.3 (05-24-21 @ 15:44)  Creatinine, Serum: 0.7 mg/dL (05-24-21 @ 21:30)      ANTIBIOTICS:  piperacillin/tazobactam IVPB.. 3.375 Gram(s) IV Intermittent every 8 hours  vancomycin  IVPB      vancomycin  IVPB 1250 milliGRAM(s) IV Intermittent every 12 hours      All available historical records have been reviewed

## 2021-05-25 NOTE — PROGRESS NOTE ADULT - SUBJECTIVE AND OBJECTIVE BOX
GENERAL SURGERY PROGRESS NOTE     CASSIDY NOE  75y  Female  Hospital day :7d  POD:  Procedure: Exploration, wound, chest    Incision and drainage, breast, with debridement    Irrigation and excisional debridement of musculofascial tissue      OVERNIGHT EVENTS: tolerated procedure well. resting comfortable in bed. WV in place. no complaints    T(F): 98.7 (05-24-21 @ 20:51), Max: 98.7 (05-24-21 @ 20:51)  HR: 91 (05-24-21 @ 20:51) (73 - 96)  BP: 154/65 (05-24-21 @ 20:51) (137/63 - 186/72)  ABP: --  ABP(mean): --  RR: 18 (05-24-21 @ 20:51) (16 - 20)  SpO2: 96% (05-24-21 @ 18:15) (95% - 96%)    DIET/FLUIDS: magnesium sulfate  IVPB 2 Gram(s) IV Intermittent every 1 hour    NG:                                                                                DRAINS:     BM:     EMESIS:     URINE:      GI proph:  pantoprazole    Tablet 40 milliGRAM(s) Oral before breakfast    AC/ proph: heparin   Injectable 5000 Unit(s) SubCutaneous every 8 hours    ABx: piperacillin/tazobactam IVPB.. 3.375 Gram(s) IV Intermittent every 8 hours  vancomycin  IVPB      vancomycin  IVPB 1250 milliGRAM(s) IV Intermittent every 12 hours      PHYSICAL EXAM:  GENERAL: NAD, well-appearing  CHEST/LUNG: Clear to auscultation bilaterally. right brest with wound vac in place. and surgical bra in place  HEART: Regular rate and rhythm  ABDOMEN: Soft, Nontender, Nondistended;   EXTREMITIES:  No clubbing, cyanosis, or edema      LABS  Labs:  CAPILLARY BLOOD GLUCOSE      POCT Blood Glucose.: 226 mg/dL (24 May 2021 20:56)  POCT Blood Glucose.: 136 mg/dL (24 May 2021 11:34)  POCT Blood Glucose.: 137 mg/dL (24 May 2021 08:05)                          8.1    15.58 )-----------( 432      ( 24 May 2021 21:30 )             25.6       Auto Neutrophil %: 82.1 % (05-24-21 @ 21:30)  Auto Immature Granulocyte %: 1.5 % (05-24-21 @ 21:30)    05-24    136  |  99  |  13  ----------------------------<  187<H>  4.0   |  24  |  0.7      Calcium, Total Serum: 7.6 mg/dL (05-24-21 @ 21:30)      LFTs:         Coags:     11.90  ----< 1.03    ( 23 May 2021 21:00 )     23.9                    Culture - Tissue with Gram Stain (collected 22 May 2021 14:00)  Source: .Tissue None  Gram Stain (23 May 2021 02:35):    Few polymorphonuclear leukocytes seen per low power field    Few Gram positive cocci in pairs seen per oil power field    Rare Gram Variable Rods seen per oil power field          RADIOLOGY & ADDITIONAL TESTS:

## 2021-05-25 NOTE — CONSULT NOTE ADULT - ATTENDING COMMENTS
pt seen and examined at bedside with PRS team and Mark  74 yo F with PMHx of HTN and BL breast lumpectomies (benign) who presented to ED for worsening  right breast pain s/p right breast biopsy (5/11/21).  She developed erythema and pain She went to ED after rupture and drainage of purulent and necrotic tissue.  She reports 30 lbs weight loss in last 3 months; denies h/o cancer.  She was admitted to Breast Surgery service for right breast abscess and management of sepsis.  She is now s/p serial debridement of right breast for necrotizing soft tissue infection (5/20, 5/22, 5/24).  She now has NPWT on right breast wound.    Infectious disease pt seen and examined at bedside with PRS team and Mark  74 yo F with PMHx of HTN and BL breast lumpectomies (benign) who presented to ED for worsening  right breast pain s/p right breast biopsy (5/11/21).  She developed erythema and pain She went to ED after rupture and drainage of purulent and necrotic tissue.  She reports 30 lbs weight loss in last 3 months; denies h/o cancer.  She was admitted to Breast Surgery service for right breast abscess and management of sepsis.  She is now s/p serial debridement of right breast for necrotizing soft tissue infection (5/20, 5/22, 5/24).  She now has NPWT on right breast wound (5/24/21)    On zosyn as per ID recommendation.  Patient pre-medicated for VAC dressing takedown and wound evaluation    AVSS  Gen: NAD  Right breast: anterior and inferolateral open breast (~18 cm x 10 cm) with subcutaneous flaps (lateral>superior), distal superior flap with hyperemic NAC with +cap refilll < 2 sec, breast tissue wound bed with no foul odor, wound gutters with gray turbid fluid, scattered dusky fat    Labs reviewed  WBC 15  upward trending platelet count, now 432    cx reviewed CoN Staph, Prevotella; UCx: E. Coli    No CT imaging    A/P: Right breast open wound s/p serial debridement of necrotizing soft tissue infection. Pt HD stable with residual tissue necrosis and ?infection.    -right breast not yet suitable for reconstruction  -consider CT chest r/o sinus tract/abscess  -recommend serial debridement  -recommend Dakins WTD BID  -spoke with son about eventual reconstructive plan pending wound bed preparation (likely outpatient): STSG vs autologous reconstruction. Benefits, risks and alternatives of each were discussed in detail.  Son states mother would consider least invasive/complex reconstruction.  Will discuss further with patient.  -Will follow  -recommendations d/w Dr. Wagner

## 2021-05-25 NOTE — PHYSICAL THERAPY INITIAL EVALUATION ADULT - GAIT TRAINING, PT EVAL
Pt will amb 150'ft with RW and supervision by time of d/c. Pt will ascend/descend 12 steps with 1HR and supervision by time of d/c

## 2021-05-25 NOTE — DISCHARGE NOTE NURSING/CASE MANAGEMENT/SOCIAL WORK - PATIENT PORTAL LINK FT
You can access the FollowMyHealth Patient Portal offered by [facility] by registering at the following website: [email or web address]. By joining [employer]’s FollowMyHealth portal, you will also be able to view your health information using other applications (apps) compatible with our system.

## 2021-05-25 NOTE — PHYSICAL THERAPY INITIAL EVALUATION ADULT - GENERAL OBSERVATIONS, REHAB EVAL
13:10-13:40. Pt encountered semifowlers in bed in NAD. +wound vac R Breast. Pt agreeable to PT IE. PT IE Complete. Pt educated on seated/supine therex. Pt educated to continue ambulation with [facility] staff. PT to f/u.

## 2021-05-25 NOTE — PHYSICAL THERAPY INITIAL EVALUATION ADULT - PERTINENT HX OF CURRENT PROBLEM, REHAB EVAL
75F w/ PMH of HTN and b/l lumpectomies (10-15 years ago, reportedly benign pathology) presented to the ED with worsening R breast pain. Pt reports R breast biopsy last week Tuesday, 5/11. She was told to keep the dressing on for 5 days. Since the biopsy, she has been experiencing R breast pain. 2 days ago, she removed the dressing and saw erythema and small amount of necrotic tissue overlying R underside of breast. This morning the abscess ruptured and began putting out pus and necrotic tissue.

## 2021-05-25 NOTE — CHART NOTE - NSCHARTNOTEFT_GEN_A_CORE
Registered Dietitian Follow-Up     Patient Profile Reviewed                           Yes [x]   No []     Nutrition History Previously Obtained        Yes []  No [x]--was able to obtain today, pt states decreased appetite/po intake x8 months of unknown etiology. Endorses wt loss over the past 3 months, states she used to weigh 168# in February and now weighs ~140#. This indicates 28# wt loss, ~17% of body wt over 3 months. No cultural/Latter-day restrictions and has NKFA. Denies use of oral nutrition supplements. Pt doesn't display any physical signs of malnutrition upon NFPE, however meets criteria for malnutrition 2/2 wt loss and decreased energy intake,      Pertinent Subjective Information: Pt w/ decreased appetite, therefore consuming <25% of all meals. Doesn't drink ordered supplements, however encouraged pt to drink them for adequate nutrition, which pt verbalized that she will. Noted that pt started on appetite stimulant medication yesterday, therefore expect po intake to improve over the next week.      Pertinent Medical Interventions: Pt went to OR on 5/20, 5/22 and 5/24 for debridements and a wound vac was placed on 5/24. Plastic surgery consulted for eventual closure of wound/previous abscess cavity; continue with wound vac therapy will re-evaluate at dressing change tomorrow 5/26 per Vascular.      Diet order: Regular + Arthur TID + Ensure Enlive TID      Anthropometrics:  - Ht. 60"  - Wt. 144#/65.1kg (5/25)--will continue to monitor wt trends closely   (5/23): 155#/70.5kg   (5/18): 140#/63.3kg   - %wt change  - BMI: 28.0 using new dosing wt  - #      Pertinent Lab Data: (5/24): H/H 8.1/25.6, POCT 142, Gluc 187, Mg 1.4      Pertinent Meds: heparin, abx, marinol, lisinopril, protonix, miralax, senna      Physical Findings:  - Appearance: alert and oriented; no edema noted   - GI function: LBM 5/24  - Tubes: N/A   - Oral/Mouth cavity: none reported   - Skin: surgical incision      Nutrition Requirements  Weight Used: lowest wt this adm: 140#/63.3kg; needs readjusted 2/2 severe PCM      Estimated Energy Needs: 4289-6442 kcal/day (MSJ x 1.2-1.5 AF)  Estimated Protein Needs: 76-95 gm/day (1.2-1.5 gm/kg CBW)  Estimated Fluid Needs: 1 ml/kcal      Nutrient Intake: not meeting kcal/pro needs at this time      Previous Nutrition Diagnosis: Inadequate Oral Intake (ongoing)     Nutrition Diagnostic #1  Problem: Severe protein-calorie malnutrition in the context of acute illness   Etiology: unknown etiology   Statement: as evidenced by wt loss of 28#, ~17% of body wt over 3 months and <50% of energy needs met for >5 days      Nutrition Intervention: meals and snacks, medical food supplements, nutrition-related medication management     Recommendations:  1. Continue current diet order and oral supplementation      Goal/Expected Outcome: Pt to consume >50% of meals, snacks, and supplements within 3 days      Indicator/Monitoring: RD to monitor energy intake, body composition, glucose/electrolyte profiles, NFPF

## 2021-05-25 NOTE — PROGRESS NOTE ADULT - ASSESSMENT
75y Female patient admitted POD 2 s/p RTOR for incision and debridement of right breast abscess, tolerated procedure well, afebrile hemodynamically stable, with the above physical exam, labs, and imaging findings.  Patient seen and examined at bedside. NAD.   Tolerating:  Diet, NPO after Midnight:      NPO Start Date: 23-May-2021,   NPO Start Time: 23:59 (05-23-21 @ 11:28)  Diet, Regular:   Arthur(7 Gm Arginine/7 Gm Glut/1.2 Gm HMB     Qty per Day:  3  Supplement Feeding Modality:  Oral  Ensure Enlive Cans or Servings Per Day:  3       Frequency:  Three Times a day (05-22-21 @ 20:22)      Plan:    - f/u cultures  - Monitor vitals  - Monitor labs and replete as necessary  - Monitor for bowel function  - Continue Pain Medications if necessary  - Continue Antibiotics if necessary  - Encourage ambulation as tolerated  - Monitor urine output  - DVT and GI Prophylaxis  - Follow PT/Physiatry if necessary  - Contact social work/case management for necessary patient needs and dispo planning

## 2021-05-25 NOTE — CONSULT NOTE ADULT - SUBJECTIVE AND OBJECTIVE BOX
CASSIDY NOE  75y, Female  Allergy: No Known Allergies      CHIEF COMPLAINT:   right breast abscess (19 May 2021 02:24)      LOS  1d    HPI  HPI:  75F w/ PMH of HTN and b/l lumpectomies (10-15 years ago, reportedly benign pathology) presented to the ED with worsening R breast pain. Pt reports R breast biopsy last week Tuesday, . She was told to keep the dressing on for 5 days. Since the biopsy, she has been experiencing R breast pain. 2 days ago, she removed the dressing and saw erythema and small amount of necrotic tissue overlying R underside of breast. This morning the abscess ruptured and began putting out pus and necrotic tissue. In the ED, T 100.9, tachycardic to 110s.     Of note, pt reports 30 pound weight loss in the past 3 months. Denies PMH of cancer. In the ED, pt tachycardic to 110s, febrile to 100.9F. WBC 16.7, lactate 2.6. Received 1950cc LR, vancomycin, zosyn, and clindamycin.  (18 May 2021 13:26)      INFECTIOUS DISEASE HISTORY:  ID consulted for necrotic breast abscess  pending drainage    PMH  PAST MEDICAL & SURGICAL HISTORY:  Hypertension    Rheumatoid arthritis        FAMILY HISTORY  non-contributory     SOCIAL HISTORY  Social History:        ROS  General: Denies rigors, nightsweats  HEENT: Denies headache, rhinorrhea, sore throat, eye pain  CV: Denies CP, palpitations  PULM: Denies wheezing, hemoptysis  GI: Denies hematemesis, hematochezia, melena  : Denies discharge, hematuria  MSK: Denies arthralgias, myalgias  SKIN: as noted above   NEURO: Denies paresthesias, weakness  PSYCH: Denies depression, anxiety     VITALS:  T(F): 99.3, Max: 100 (21 @ 20:51)  HR: 98  BP: 129/60  RR: 18Vital Signs Last 24 Hrs  T(C): 37.4 (19 May 2021 05:20), Max: 37.8 (18 May 2021 20:51)  T(F): 99.3 (19 May 2021 05:20), Max: 100 (18 May 2021 20:51)  HR: 98 (19 May 2021 05:20) (98 - 99)  BP: 129/60 (19 May 2021 05:20) (119/56 - 130/76)  BP(mean): --  RR: 18 (19 May 2021 05:20) (18 - 18)  SpO2: --    PHYSICAL EXAM:  Gen: NAD, resting in bed  HEENT: Normocephalic, atraumatic  Neck: supple, no lymphadenopathy  CV: Regular rate & regular rhythm  Breast: R breast foul smelling drainage, pt would not allow me to follow remove the dressings  Lungs: decreased BS at bases, no fremitus  Abdomen: Soft, BS present  Ext: Warm, well perfused  Neuro: non focal, awake  Skin: no rash, no erythema  Lines: no phlebitis     TESTS & MEASUREMENTS:                        10.2   12.63 )-----------( 324      ( 18 May 2021 22:00 )             30.1         133<L>  |  103  |  26<H>  ----------------------------<  138<H>  4.0   |  18  |  0.8    Ca    7.7<L>      19 May 2021 05:34  Phos  2.6       Mg     1.8         TPro  5.6<L>  /  Alb  3.0<L>  /  TBili  0.5  /  DBili  x   /  AST  10  /  ALT  14  /  AlkPhos  122<H>      eGFR if : 82 mL/min/1.73M2 (21 @ 05:34)  eGFR if Non African American: 71 mL/min/1.73M2 (21 @ 05:34)  eGFR if African American: 71 mL/min/1.73M2 (21 @ 22:00)  eGFR if Non African American: 62 mL/min/1.73M2 (21 @ 22:00)  eGFR if Non African American: 54 mL/min/1.73M2 (21 @ 18:19)  eGFR if : 63 mL/min/1.73M2 (21 @ 18:19)    LIVER FUNCTIONS - ( 18 May 2021 10:30 )  Alb: 3.0 g/dL / Pro: 5.6 g/dL / ALK PHOS: 122 U/L / ALT: 14 U/L / AST: 10 U/L / GGT: x           Urinalysis Basic - ( 18 May 2021 13:17 )    Color: Yellow / Appearance: Slightly Turbid / S.019 / pH: x  Gluc: x / Ketone: Negative  / Bili: Negative / Urobili: <2 mg/dL   Blood: x / Protein: 100 mg/dL / Nitrite: Negative   Leuk Esterase: Negative / RBC: 8 /HPF / WBC 16 /HPF   Sq Epi: x / Non Sq Epi: 16 /HPF / Bacteria: Few          Lactate, Blood: 1.5 mmol/L (21 @ 22:00)  Lactate, Blood: 1.9 mmol/L (21 @ 18:19)  Lactate, Blood: 1.3 mmol/L (21 @ 12:07)  Lactate, Blood: 2.6 mmol/L (21 @ 10:30)      INFECTIOUS DISEASES TESTING  MRSA PCR Result.: Negative (21 @ 10:40)  COVID-19 PCR: NotDetec (21 @ 10:14)      INFLAMMATORY MARKERS      RADIOLOGY & ADDITIONAL TESTS:  I have personally reviewed the last Chest xray  CXR  Xray Chest 1 View- PORTABLE-Urgent:   EXAM:  XR CHEST PORTABLE URGENT 1V            PROCEDURE DATE:  2021            INTERPRETATION:  Clinical History / Reason for exam: Sepsis.    Comparison : Chest radiograph None.    Technique/Positioning: Frontal view time 10:54 AM.    Findings:    The heart size is within normal limits.    Calcification within the thoracic aorta.    Left basilar opacity.    Diffuse osteopenia.    Impression:    Left basilar opacity.              FIDENCIO BERNABE MD; Attending Radiologist  This document has been electronically signed. May 18 2021  2:55PM (21 @ 11:10)      CT      CARDIOLOGY TESTING  12 Lead ECG:   Ventricular Rate 89 BPM    Atrial Rate 89 BPM    P-R Interval 150 ms    QRS Duration 90 ms    Q-T Interval 366 ms    QTC Calculation(Bazett) 445 ms    P Axis 75 degrees    R Axis 78 degrees    T Axis 70 degrees    Diagnosis Line Normal sinus rhythm  Normal ECG    Confirmed by KENDRA HOLT MD ([id number]) on 2021 3:22:53 PM (21 @ 14:27)  12 Lead ECG:   Ventricular Rate 92 BPM    Atrial Rate 92 BPM    P-R Interval 142 ms    QRS Duration 78 ms    Q-T Interval 358 ms    QTC Calculation(Bazett) 442 ms    P Axis 75 degrees    R Axis 65 degrees    T Axis 67 degrees    Diagnosis Line Sinus rhythm with Premature atrial complexes  ST elevation, consider early repolarization  Borderline ECG    Confirmed by KENDRA HOLT MD ([id number]) on 2021 12:58:02 PM (21 @ 11:16)      MEDICATIONS  amoxicillin  875 milliGRAM(s)/clavulanate 1 Oral every 12 hours  chlorhexidine 4% Liquid 1 Topical <User Schedule>  dextrose 5% + sodium chloride 0.45% with potassium chloride 20 mEq/L 1000 IV Continuous <Continuous>  heparin   Injectable 5000 SubCutaneous every 8 hours  lisinopril 20 Oral daily  pantoprazole    Tablet 40 Oral before breakfast  senna 2 Oral at bedtime  sodium phosphate IVPB 15 IV Intermittent once        ANTIBIOTICS:  amoxicillin  875 milliGRAM(s)/clavulanate 1 Tablet(s) Oral every 12 hours      ALLERGIES:  No Known Allergies        
HPI:  75F w/ PMH of HTN and b/l lumpectomies (10-15 years ago, reportedly benign pathology) presented to the ED with worsening R breast pain. Pt reports R breast biopsy last week Tuesday, 5/11. She was told to keep the dressing on for 5 days. Since the biopsy, she has been experiencing R breast pain. 2 days ago, she removed the dressing and saw erythema and small amount of necrotic tissue overlying R underside of breast. This morning the abscess ruptured and began putting out pus and necrotic tissue. In the ED, T 100.9, tachycardic to 110s.     Of note, pt reports 30 pound weight loss in the past 3 months. Denies PMH of cancer. In the ED, pt tachycardic to 110s, febrile to 100.9F. WBC 16.7, lactate 2.6. Received 1950cc LR, vancomycin, zosyn, and clindamycin. s/p I&D on 5/24      PAST MEDICAL & SURGICAL HISTORY:  Hypertension    Rheumatoid arthritis        Hospital Course:    TODAY'S SUBJECTIVE & REVIEW OF SYMPTOMS:     Constitutional WNL   Cardio WNL   Resp WNL   GI WNL  Heme WNL  Endo WNL  Skin WNL  MSK pain  Neuro WNL  Cognitive WNL  Psych WNL      MEDICATIONS  (STANDING):  acetaminophen   Tablet .. 650 milliGRAM(s) Oral every 6 hours  chlorhexidine 4% Liquid 1 Application(s) Topical <User Schedule>  dronabinol 2.5 milliGRAM(s) Oral two times a day  heparin   Injectable 5000 Unit(s) SubCutaneous every 8 hours  lisinopril 20 milliGRAM(s) Oral daily  pantoprazole    Tablet 40 milliGRAM(s) Oral before breakfast  piperacillin/tazobactam IVPB.. 3.375 Gram(s) IV Intermittent every 8 hours  polyethylene glycol 3350 17 Gram(s) Oral daily  senna 2 Tablet(s) Oral at bedtime    MEDICATIONS  (PRN):  ketorolac   Injectable 15 milliGRAM(s) IV Push every 8 hours PRN Moderate Pain (4 - 6)  oxyCODONE    IR 5 milliGRAM(s) Oral every 6 hours PRN Moderate Pain (4 - 6)  traZODone 50 milliGRAM(s) Oral at bedtime PRN insomnia/anxiety      FAMILY HISTORY:      Allergies    No Known Allergies    Intolerances        SOCIAL HISTORY:    [  ] Etoh  [  ] Smoking  [  ] Substance abuse     Home Environment:  [   ] Home Alone  [ x  ] Lives with Family  [   ] Home Health Aid    Dwelling:  [   ] Apartment  [ x  ] Private House  [   ] Adult Home  [   ] Skilled Nursing Facility      [   ] Short Term  [   ] Long Term  [ x  ] Stairs       Elevator [   ]    FUNCTIONAL STATUS PTA: (Check all that apply)  Ambulation: [  x  ]Independent    [   ] Dependent     [   ] Non-Ambulatory  Assistive Device: [   ] SA Cane  [   ]  Q Cane  [   ] Walker  [   ]  Wheelchair  ADL : [ x  ] Independent  [    ]  Dependent       Vital Signs Last 24 Hrs  T(C): 37.3 (25 May 2021 12:59), Max: 37.7 (25 May 2021 05:22)  T(F): 99.1 (25 May 2021 12:59), Max: 99.8 (25 May 2021 05:22)  HR: 80 (25 May 2021 12:59) (73 - 94)  BP: 140/63 (25 May 2021 12:59) (128/64 - 179/76)  BP(mean): --  RR: 18 (25 May 2021 12:59) (16 - 19)  SpO2: 96% (25 May 2021 14:30) (96% - 96%)      PHYSICAL EXAM: Awake & Alert  GENERAL: NAD  HEAD:  Normocephalic  CHEST/LUNG: Clear   HEART: S1S2+  ABDOMEN: Soft, Nontender  EXTREMITIES:  no calf tenderness    NERVOUS SYSTEM:  Cranial Nerves 2-12 intact [   ] Abnormal  [   ]  ROM: WFL all extremities [ x  ]  Abnormal [   ]  Motor Strength: WFL all extremities  [  x ]  Abnormal [   ]  Sensation: intact to light touch [ x  ] Abnormal [   ]    FUNCTIONAL STATUS:  Bed Mobility: Independent [   ]  Supervision [   ]  Needs Assistance [x   ]  N/A [   ]  Transfers: Independent [   ]  Supervision [   ]  Needs Assistance [ x  ]  N/A [   ]   Ambulation: Independent [   ]  Supervision [   ]  Needs Assistance [x   ]  N/A [   ]  ADL: Independent [   ] Requires Assistance [   ] N/A [   ]      LABS:                        8.1    15.58 )-----------( 432      ( 24 May 2021 21:30 )             25.6     05-24    136  |  99  |  13  ----------------------------<  187<H>  4.0   |  24  |  0.7    Ca    7.6<L>      24 May 2021 21:30  Phos  3.3     05-24  Mg     1.4     05-24      PT/INR - ( 23 May 2021 21:00 )   PT: 11.90 sec;   INR: 1.03 ratio         PTT - ( 23 May 2021 21:00 )  PTT:23.9 sec      RADIOLOGY & ADDITIONAL STUDIES:  
  Patient is a 75y old  Female who presents with a chief complaint of right breast abscess (25 May 2021 07:20)    HPI:  75F w/ PMH of HTN and b/l lumpectomies (10-15 years ago, reportedly benign pathology) presented to the ED with worsening R breast pain. Pt reports R breast biopsy last week Tuesday, 5/11. She was told to keep the dressing on for 5 days. Since the biopsy, she has been experiencing R breast pain. 2 days ago, she removed the dressing and saw erythema and small amount of necrotic tissue overlying R underside of breast. This morning the abscess ruptured and began putting out pus and necrotic tissue. In the ED, T 100.9, tachycardic to 110s.     Of note, pt reports 30 pound weight loss in the past 3 months. Denies PMH of cancer. In the ED, pt tachycardic to 110s, febrile to 100.9F. WBC 16.7, lactate 2.6. Received 1950cc LR, vancomycin, zosyn, and clindamycin.  (18 May 2021 13:26)    Patient went to OR on 5/20, 5/22 and 5/24 for debridements and a wound vac was placed on 5/24.   Plastic surgery consulted for: eventual closure of wound/previous abscess cavity    Patient denies denies lightheadedness/dizziness, denies SOB/chest pain, denies nausea/vomiting, denies constipation/diarrhea.      ROS: 10-system review is otherwise negative except HPI above.      PAST MEDICAL & SURGICAL HISTORY:  Hypertension    Rheumatoid arthritis      FAMILY HISTORY:    [X] Family history not pertinent as reviewed with the patient and family    SOCIAL HISTORY:   +tobacco, cigarette smoker    ALLERGIES: No Known Allergies      HOME MEDICATIONS:    trazodone   lisinopril    CURRENT MEDICATIONS  MEDICATIONS (STANDING): acetaminophen   Tablet .. 650 milliGRAM(s) Oral every 6 hours  dronabinol 2.5 milliGRAM(s) Oral two times a day  heparin   Injectable 5000 Unit(s) SubCutaneous every 8 hours  lisinopril 20 milliGRAM(s) Oral daily  pantoprazole    Tablet 40 milliGRAM(s) Oral before breakfast  piperacillin/tazobactam IVPB.. 3.375 Gram(s) IV Intermittent every 8 hours  polyethylene glycol 3350 17 Gram(s) Oral daily  senna 2 Tablet(s) Oral at bedtime    MEDICATIONS (PRN):ketorolac   Injectable 15 milliGRAM(s) IV Push every 8 hours PRN Moderate Pain (4 - 6)  oxyCODONE    IR 5 milliGRAM(s) Oral every 6 hours PRN Moderate Pain (4 - 6)  traZODone 50 milliGRAM(s) Oral at bedtime PRN insomnia/anxiety    --------------------------------------------------------------------------------------------    Vitals:   T(C): 37.7 (05-25-21 @ 05:22), Max: 37.7 (05-25-21 @ 05:22)  HR: 94 (05-25-21 @ 05:22) (73 - 94)  BP: 155/70 (05-25-21 @ 05:22) (137/63 - 179/76)  RR: 18 (05-25-21 @ 05:22) (16 - 19)  SpO2: 96% (05-24-21 @ 18:15) (95% - 96%)  CAPILLARY BLOOD GLUCOSE  POCT Blood Glucose.: 167 mg/dL (25 May 2021 07:31)  POCT Blood Glucose.: 226 mg/dL (24 May 2021 20:56)  POCT Blood Glucose.: 136 mg/dL (24 May 2021 11:34)      05-24 @ 07:01  -  05-25 @ 07:00  --------------------------------------------------------  IN:    IV PiggyBack: 350 mL  Total IN: 350 mL    OUT:    Voided (mL): 200 mL  Total OUT: 200 mL    Total NET: 150 mL    Height (cm): 152.4 (05-24 @ 15:44)  Weight (kg): 63.3 (05-24 @ 15:44)  BMI (kg/m2): 27.3 (05-24 @ 15:44)  BSA (m2): 1.6 (05-24 @ 15:44)      Physical Examination:  General Appearance: NAD  HEENT: EOMI, sclera non-icteric.  Heart: RRR   Lungs: No increased work of breathing or accessory muscle use. Symmetric chest wall rise and fall.   Abdomen:  Obese Soft, nontender, nondistended.   MSK/Extremities: Warm & well-perfused.   Skin: Warm, dry. No jaundice.   Left breast: Large breasts. No lesions/masses, no drainage  Right breast: patient with large abscess cavity inferior to nipple spanning about 7cm. Currently with wound vac with minimal drainage in canister set to -75mmHg of suction. areas of erythema and dark tissue of surrounding skin with discoloration. Capillary refill <2s, no fluctuance, very tender to palpation  --------------------------------------------------------------------------------------------    LABS  CBC (05-24 @ 21:30)                              8.1<L>                         15.58<H>  )----------------(  432<H>     82.1<H>% Neutrophils, 10.3<L>% Lymphocytes, ANC: 12.78<H>                              25.6<L>    BMP (05-24 @ 21:30)             136     |  99      |  13    		Ca++ --      Ca 7.6<L>             ---------------------------------( 187<H>		Mg 1.4<L>             4.0     |  24      |  0.7   			Ph 3.3         --------------------------------------------------------------------------------------------    MICROBIOLOGY    -> .Tissue None Culture (05-22 @ 14:00)       Few polymorphonuclear leukocytes seen per low power field  Few Gram positive cocci in pairs seen per oil power field  Rare Gram Variable Rods seen per oil power field    NG  NG    -> .Tissue None Culture (05-20 @ 10:44)       Rare polymorphonuclear leukocytes seen per low power field  Rare Gram Variable Rods seen per oil power field  Rare Gram positive cocci in pairs seen per oil power field    Coag Negative Staphylococcus    Growth in fluid media only Coag Negative Staphylococcus  Moderate Anaerobic Gram Negative [place] Most closely resembling  Prevotella species "Susceptibilities not performed"    -> .Urine Clean Catch (Midstream) Culture (05-18 @ 13:17)     NG    Escherichia coli    10,000 - 49,000 CFU/mL Escherichia coli  <10,000 CFU/ml Normal Urogenital johanna present    -> .Blood Blood-Peripheral Culture (05-18 @ 11:00)       Growth in anaerobic bottle: Gram Positive Cocci in Clusters    Blood Culture PCR    No Growth Final      --------------------------------------------------------------------------------------------  
CASSIDY NOE [id number]  75y Female      HPI:  75F w/ PMH of HTN and b/l lumpectomies (10-15 years ago, reportedly benign pathology) presented to the ED with worsening R breast pain. Pt reports R breast biopsy last week Tuesday, 5/11. She was told to keep the dressing on for 5 days. Since the biopsy, she has been experiencing R breast pain. 2 days ago, she removed the dressing and saw erythema and small amount of necrotic tissue overlying R underside of breast. This morning the abscess ruptured and began putting out pus and necrotic tissue. In the ED, T 100.9, tachycardic to 110s.     Of note, pt reports 30 pound weight loss in the past 3 months. Denies PMH of cancer.    PAST MEDICAL & SURGICAL HISTORY:  Hypertension    MEDICATIONS  (STANDING):  ondansetron Injectable 4 milliGRAM(s) IV Push once  piperacillin/tazobactam IVPB. 3.375 Gram(s) IV Intermittent Once  vancomycin  IVPB 1000 milliGRAM(s) IV Intermittent once    Allergies  No Known Allergies    REVIEW OF SYSTEMS  [X] A ten-point review of systems was otherwise negative except as noted.  [ ] Due to altered mental status/intubation, subjective information were not able to be obtained from the patient. History was obtained, to the extent possible, from review of the chart and collateral sources of information.    Vital Signs Last 24 Hrs  T(C): 38.3 (18 May 2021 09:45), Max: 38.3 (18 May 2021 09:45)  T(F): 100.9 (18 May 2021 09:45), Max: 100.9 (18 May 2021 09:45)  HR: 110 (18 May 2021 08:59) (110 - 110)  BP: 120/51 (18 May 2021 08:59) (120/51 - 120/51)  RR: 16 (18 May 2021 08:59) (16 - 16)  SpO2: 97% (18 May 2021 08:59) (97% - 97%)    PHYSICAL EXAM:  GENERAL: NAD  BREAST: R breast erythema/induration from 3 o'clock to 9 o'clock, necrotic tissue overlying, skin sloughing  ABDOMEN: Soft, Nontender, Nondistended;   EXTREMITIES: No clubbing, cyanosis, or edema      LABS:

## 2021-05-25 NOTE — PROGRESS NOTE ADULT - ASSESSMENT
ASSESSMENT  75F w/ PMH of HTN and b/l lumpectomies (10-15 years ago, reportedly benign pathology) presented to the ED with worsening R breast pain. Pt reports R breast biopsy last week Tuesday, 5/11. She was told to keep the dressing on for 5 days. Since the biopsy, she has been experiencing R breast pain. 2 days ago, she removed the dressing and saw erythema and small amount of necrotic tissue overlying R underside of breast. This morning the abscess ruptured and began putting out pus and necrotic tissue.    IMPRESSION  #Severe Sepsis on admission lactic acidosis due to necrotic R breast abscess    5/22 WCX pending   s/p debridement 5/22 -- necrotizing soft tissue infection    s/p OR WCX 5/20 --   Growth in fluid media only Coag Negative Staphylococcus- contaminant     Moderate Anaerobic Gram Negative Rocky Most closely resembling    Prevotella species "Susceptibilities not performed"  s/p Exploration, wound, chest 20-May-2021 08:50:47 right Michelle Wagner Y  Incision and drainage, breast, with debridement 20-May-2021 "extensive necrotizing soft tissue infection occupying more than 2/3rds of her breast, debrided to healthy tissue, packed with kerlex"    UCX   10,000 - 49,000 CFU/mL Escherichia coli (I unasyn, R bactrim)    s/p I&D exudate, purulent drainage, necrotic tissue    MRSA PCR Result.: Negative (05-19-21 @ 10:40)  #CoNS in bcx is a contaminant 1/4 bottles    5/18 1/4 bottles coNS  #Hyponatremia  Creatinine, Serum: 0.8 (05-19-21 @ 05:34)    Weight (kg): 63.3 (05-18-21 @ 16:27)    RECOMMENDATIONS  - F/u 5/24 BCX   - f/u final WCX 5/20, 5/22  - continue zosyn 3.375 Gram(s) IV Intermittent every 8 hours     Please call with any questions or send a message on [employer] Teams  Spectra [phone]    ASSESSMENT  75F w/ PMH of HTN and b/l lumpectomies (10-15 years ago, reportedly benign pathology) presented to the ED with worsening R breast pain. Pt reports R breast biopsy last week Tuesday, 5/11. She was told to keep the dressing on for 5 days. Since the biopsy, she has been experiencing R breast pain. 2 days ago, she removed the dressing and saw erythema and small amount of necrotic tissue overlying R underside of breast. This morning the abscess ruptured and began putting out pus and necrotic tissue.    IMPRESSION  #Severe Sepsis on admission lactic acidosis due to necrotic R breast abscess    5/22 WCX pending   s/p debridement 5/22 -- necrotizing soft tissue infection    s/p OR WCX 5/20 --   Growth in fluid media only Coag Negative Staphylococcus- contaminant     Moderate Anaerobic Gram Negative Rocky Most closely resembling    Prevotella species "Susceptibilities not performed"  s/p Exploration, wound, chest 20-May-2021 08:50:47 right Michelle Wagner Y  Incision and drainage, breast, with debridement 20-May-2021 "extensive necrotizing soft tissue infection occupying more than 2/3rds of her breast, debrided to healthy tissue, packed with kerlex"    UCX   10,000 - 49,000 CFU/mL Escherichia coli (I unasyn, R bactrim)    s/p I&D exudate, purulent drainage, necrotic tissue    MRSA PCR Result.: Negative (05-19-21 @ 10:40)  #CoNS in bcx is a contaminant 1/4 bottles    5/18 1/4 bottles coNS  #Hyponatremia  Creatinine, Serum: 0.8 (05-19-21 @ 05:34)    Weight (kg): 63.3 (05-18-21 @ 16:27)    RECOMMENDATIONS  - F/u 5/24 BCX   - f/u final WCX 5/20, 5/22  - continue zosyn 3.375 Gram(s) IV Intermittent every 8 hours     Please call with any questions or send a message on [employer] Teams  Spectra [phone]

## 2021-05-25 NOTE — CONSULT NOTE ADULT - ASSESSMENT
75F w/ PMH of HTN and b/l lumpectomies (10-15 years ago, reportedly benign pathology) presented to the ED with worsening R breast pain on 5/18. Pt reports R breast biopsy last week Tuesday, 5/11. She was told to keep the dressing on for 5 days. Since the biopsy, she has been experiencing R breast pain. on 5/18 she removed the dressing and saw erythema and small amount of necrotic tissue overlying R underside of breast. 5/18 abscess ruptured and began putting out pus and necrotic tissue. In the ED, T 100.9, tachycardic to 110s.     Patient went to OR on 5/20, 5/22 and 5/24 for debridements and a wound vac was placed on 5/24.  Plastic surgery consulted for eventual closure of wound/previous abscess cavity      PLAN:   - Continue with antibiotics as per infectious disease  - Continue with wound vac therapy will re-evaluate at dressing change tomorrow 5/26  - continue to monitor labs and WBC  - patient to f/u with Dr. Montana  - Plan to be discussed with Attending, Dr. Montana  
75F w/ PMH of HTN and b/l lumpectomies (10-15 years ago, reportedly benign pathology) presented to the ED with worsening R breast pain.     PLAN:   - breast surgeon to evaluate
IMPRESSION: Rehab of gait dysfunction     PRECAUTIONS: [   ] Cardiac  [   ] Respiratory  [   ] Seizures [   ] Contact Isolation  [   ] Droplet Isolation  [   ] Other    Weight Bearing Status:     RECOMMENDATION:    Out of Bed to Chair     DVT/Decubiti Prophylaxis    REHAB PLAN:     [ x   ] Bedside P/T 3-5 times a week   [    ]   Bedside O/T  2-3 times a week             [    ] Speech Therapy               [    ]  No Rehab Therapy Indicated   Conditioning/ROM                                    ADL  Bed Mobility                                               Conditioning/ROM  Transfers                                                     Bed Mobility  Sitting /Standing Balance                         Transfers                                        Gait Training                                               Sitting/Standing Balance  Stair Training [   ]Applicable                    Home equipment Eval                                                                        Splinting  [   ] Only      GOALS:   ADL   [    ]   Independent                    Transfers  [  x  ] Independent                          Ambulation  [ x   ] Independent     [ x    ] With device                            [    ]  CG                                                         [    ]  CG                                                                  [    ] CG                            [    ] Min A                                                   [    ] Min A                                                              [    ] Min  A          DISCHARGE PLAN:   [    ]  Good candidate for Intensive Rehabilitation/Hospital based                                             Will tolerate 3hrs Intensive Rehab Daily                                       [     ]  Short Term Rehab in Skilled Nursing Facility                                       [  x   ]  Home with Outpatient or  services                                         [     ]  Possible Candidate for Intensive Hospital based Rehab                                       
ASSESSMENT  75F w/ PMH of HTN and b/l lumpectomies (10-15 years ago, reportedly benign pathology) presented to the ED with worsening R breast pain. Pt reports R breast biopsy last week Tuesday, 5/11. She was told to keep the dressing on for 5 days. Since the biopsy, she has been experiencing R breast pain. 2 days ago, she removed the dressing and saw erythema and small amount of necrotic tissue overlying R underside of breast. This morning the abscess ruptured and began putting out pus and necrotic tissue.    IMPRESSION  #Severe Sepsis on admission lactic acidosis due to necrotic R breast abscess     s/p I&D exudate, purulent drainage, necrotic tissue    MRSA PCR Result.: Negative (05-19-21 @ 10:40)  #Hyponatremia  Creatinine, Serum: 0.8 (05-19-21 @ 05:34)    Weight (kg): 63.3 (05-18-21 @ 16:27)    RECOMMENDATIONS  - Unasyn 1.5 q6h IV  - no abscess cx sent in ER  - ESR, CRP   - pending OR     If any questions, please call or send a message on [employer] Teams  Spectra [phone]

## 2021-05-26 LAB
ANION GAP SERPL CALC-SCNC: 8 MMOL/L — SIGNIFICANT CHANGE UP (ref 7–14)
APTT BLD: 25.7 SEC — LOW (ref 27–39.2)
BASOPHILS # BLD AUTO: 0.03 K/UL — SIGNIFICANT CHANGE UP (ref 0–0.2)
BASOPHILS NFR BLD AUTO: 0.3 % — SIGNIFICANT CHANGE UP (ref 0–1)
BLD GP AB SCN SERPL QL: SIGNIFICANT CHANGE UP
BUN SERPL-MCNC: 25 MG/DL — HIGH (ref 10–20)
CALCIUM SERPL-MCNC: 7.8 MG/DL — LOW (ref 8.5–10.1)
CHLORIDE SERPL-SCNC: 102 MMOL/L — SIGNIFICANT CHANGE UP (ref 98–110)
CO2 SERPL-SCNC: 27 MMOL/L — SIGNIFICANT CHANGE UP (ref 17–32)
CREAT SERPL-MCNC: 0.9 MG/DL — SIGNIFICANT CHANGE UP (ref 0.7–1.5)
EOSINOPHIL # BLD AUTO: 0.26 K/UL — SIGNIFICANT CHANGE UP (ref 0–0.7)
EOSINOPHIL NFR BLD AUTO: 2.4 % — SIGNIFICANT CHANGE UP (ref 0–8)
GLUCOSE BLDC GLUCOMTR-MCNC: 122 MG/DL — HIGH (ref 70–99)
GLUCOSE BLDC GLUCOMTR-MCNC: 170 MG/DL — HIGH (ref 70–99)
GLUCOSE SERPL-MCNC: 103 MG/DL — HIGH (ref 70–99)
HCT VFR BLD CALC: 22.6 % — LOW (ref 37–47)
HGB BLD-MCNC: 7.3 G/DL — LOW (ref 12–16)
IMM GRANULOCYTES NFR BLD AUTO: 1.1 % — HIGH (ref 0.1–0.3)
INR BLD: 1.11 RATIO — SIGNIFICANT CHANGE UP (ref 0.65–1.3)
LYMPHOCYTES # BLD AUTO: 18.8 % — LOW (ref 20.5–51.1)
LYMPHOCYTES # BLD AUTO: 2 K/UL — SIGNIFICANT CHANGE UP (ref 1.2–3.4)
MAGNESIUM SERPL-MCNC: 1.6 MG/DL — LOW (ref 1.8–2.4)
MCHC RBC-ENTMCNC: 27.8 PG — SIGNIFICANT CHANGE UP (ref 27–31)
MCHC RBC-ENTMCNC: 32.3 G/DL — SIGNIFICANT CHANGE UP (ref 32–37)
MCV RBC AUTO: 85.9 FL — SIGNIFICANT CHANGE UP (ref 81–99)
MONOCYTES # BLD AUTO: 0.8 K/UL — HIGH (ref 0.1–0.6)
MONOCYTES NFR BLD AUTO: 7.5 % — SIGNIFICANT CHANGE UP (ref 1.7–9.3)
NEUTROPHILS # BLD AUTO: 7.44 K/UL — HIGH (ref 1.4–6.5)
NEUTROPHILS NFR BLD AUTO: 69.9 % — SIGNIFICANT CHANGE UP (ref 42.2–75.2)
NRBC # BLD: 0 /100 WBCS — SIGNIFICANT CHANGE UP (ref 0–0)
PHOSPHATE SERPL-MCNC: 2.6 MG/DL — SIGNIFICANT CHANGE UP (ref 2.1–4.9)
PLATELET # BLD AUTO: 522 K/UL — HIGH (ref 130–400)
POTASSIUM SERPL-MCNC: 4.1 MMOL/L — SIGNIFICANT CHANGE UP (ref 3.5–5)
POTASSIUM SERPL-SCNC: 4.1 MMOL/L — SIGNIFICANT CHANGE UP (ref 3.5–5)
PROTHROM AB SERPL-ACNC: 12.8 SEC — SIGNIFICANT CHANGE UP (ref 9.95–12.87)
RBC # BLD: 2.63 M/UL — LOW (ref 4.2–5.4)
RBC # FLD: 14.7 % — HIGH (ref 11.5–14.5)
SARS-COV-2 RNA SPEC QL NAA+PROBE: SIGNIFICANT CHANGE UP
SODIUM SERPL-SCNC: 137 MMOL/L — SIGNIFICANT CHANGE UP (ref 135–146)
SURGICAL PATHOLOGY STUDY: SIGNIFICANT CHANGE UP
WBC # BLD: 10.65 K/UL — SIGNIFICANT CHANGE UP (ref 4.8–10.8)
WBC # FLD AUTO: 10.65 K/UL — SIGNIFICANT CHANGE UP (ref 4.8–10.8)

## 2021-05-26 RX ADMIN — PIPERACILLIN AND TAZOBACTAM 25 GRAM(S): 4; .5 INJECTION, POWDER, LYOPHILIZED, FOR SOLUTION INTRAVENOUS at 21:38

## 2021-05-26 RX ADMIN — HEPARIN SODIUM 5000 UNIT(S): 5000 INJECTION INTRAVENOUS; SUBCUTANEOUS at 21:38

## 2021-05-26 RX ADMIN — PIPERACILLIN AND TAZOBACTAM 25 GRAM(S): 4; .5 INJECTION, POWDER, LYOPHILIZED, FOR SOLUTION INTRAVENOUS at 13:18

## 2021-05-26 RX ADMIN — PIPERACILLIN AND TAZOBACTAM 25 GRAM(S): 4; .5 INJECTION, POWDER, LYOPHILIZED, FOR SOLUTION INTRAVENOUS at 05:45

## 2021-05-26 RX ADMIN — PANTOPRAZOLE SODIUM 40 MILLIGRAM(S): 20 TABLET, DELAYED RELEASE ORAL at 05:19

## 2021-05-26 RX ADMIN — HEPARIN SODIUM 5000 UNIT(S): 5000 INJECTION INTRAVENOUS; SUBCUTANEOUS at 05:20

## 2021-05-26 RX ADMIN — Medication 15 MILLIGRAM(S): at 18:38

## 2021-05-26 RX ADMIN — Medication 650 MILLIGRAM(S): at 18:38

## 2021-05-26 RX ADMIN — Medication 2.5 MILLIGRAM(S): at 05:20

## 2021-05-26 RX ADMIN — HEPARIN SODIUM 5000 UNIT(S): 5000 INJECTION INTRAVENOUS; SUBCUTANEOUS at 13:18

## 2021-05-26 RX ADMIN — OXYCODONE HYDROCHLORIDE 5 MILLIGRAM(S): 5 TABLET ORAL at 11:45

## 2021-05-26 RX ADMIN — Medication 15 MILLIGRAM(S): at 01:05

## 2021-05-26 RX ADMIN — Medication 650 MILLIGRAM(S): at 11:45

## 2021-05-26 RX ADMIN — SENNA PLUS 2 TABLET(S): 8.6 TABLET ORAL at 21:38

## 2021-05-26 RX ADMIN — OXYCODONE HYDROCHLORIDE 5 MILLIGRAM(S): 5 TABLET ORAL at 15:06

## 2021-05-26 RX ADMIN — POLYETHYLENE GLYCOL 3350 17 GRAM(S): 17 POWDER, FOR SOLUTION ORAL at 11:45

## 2021-05-26 RX ADMIN — CHLORHEXIDINE GLUCONATE 1 APPLICATION(S): 213 SOLUTION TOPICAL at 05:20

## 2021-05-26 RX ADMIN — Medication 15 MILLIGRAM(S): at 16:14

## 2021-05-26 RX ADMIN — Medication 650 MILLIGRAM(S): at 05:19

## 2021-05-26 RX ADMIN — Medication 650 MILLIGRAM(S): at 17:28

## 2021-05-26 RX ADMIN — LISINOPRIL 20 MILLIGRAM(S): 2.5 TABLET ORAL at 05:20

## 2021-05-26 RX ADMIN — Medication 650 MILLIGRAM(S): at 05:20

## 2021-05-26 RX ADMIN — Medication 50 MILLIGRAM(S): at 21:38

## 2021-05-26 RX ADMIN — Medication 2.5 MILLIGRAM(S): at 17:28

## 2021-05-26 RX ADMIN — Medication 650 MILLIGRAM(S): at 15:06

## 2021-05-26 NOTE — PROGRESS NOTE ADULT - ASSESSMENT
Assessment:  75y Female patient s/p multiple debridements of R breast in OR. 5/20, 5/22, 5/24  Patient seen and examined at bedside. NAD.   Tolerating:  Diet, NPO after Midnight:      NPO Start Date: 25-May-2021,   NPO Start Time: 23:59 (05-25-21 @ 20:54)  Diet, Regular:   Arthur(7 Gm Arginine/7 Gm Glut/1.2 Gm HMB     Qty per Day:  3  Supplement Feeding Modality:  Oral  Ensure Enlive Cans or Servings Per Day:  3       Frequency:  Three Times a day (05-24-21 @ 17:54)    ambulating+  BM/g+  voiding+  pain controlled     Plan:  - encourage PO intake  - possible OR today for further debridement  - WBC downtrending, possible dc planning on ABX  - Monitor vitals  - Monitor labs and replete as necessary  - Monitor for bowel function  - Continue Pain Medications if necessary  - Continue Antibiotics if necessary  - Encourage ambulation as tolerated  - Monitor urine output  - DVT and GI Prophylaxis  - Follow PT/Physiatry if necessary  - Contact social work/case management for necessary patient needs and dispo planning      Date/Time: 05-26-21 @ 02:55

## 2021-05-26 NOTE — PRE-ANESTHESIA EVALUATION ADULT - NSANTHPMHFT_GEN_ALL_CORE
awake, alert  lungs clear, no murmur
74 yo F with PMHx of essential hypertension, dyslipidemia, mild anxiety, hx of sciatica, thalassemia trait, L rotator cuff tear, L pubic ramus fracture presents with right hip pain s/p mechanical fall.    # Acute intertrochanteric fracture of the right hip  - hemodynamically stable  - Leucocytosis likely reactive   - Xray Hip 2-3 Views, Right (05.25.21): Acute intertrochanteric fracture right hip with varus angulation and displacement of lesser tuberosity. Chronic fracture deformity left inferior pubic ramus.  - RLE: NWB  - possible OR today as an add on but in the event of cancelation, NPO MN, OR tmrw   - continue NS@75 while NPO  - Pain Control  - RCRI = 0  - METS >4  - patient at moderate risk for moderate risk procedure   - Follow-up Ortho  - continue DVT prophylaxis perioperatively    # Essential hypertension  - c/w Losartan, HCTZ, Atenolol - can hold diuretic on day of surgery    # Dyslipidemia  - c/w Simvastatin    # Chronic Anemia 2/2 thalassemia trait - baseline in the 9 range per chart review  - Hgb 8.6 today - likely due to acute blood loss from fracture  - monitor H/H and maintain active T&S even postop  - transfuse for Hgb <7  - outpt hematology f/u    # DVT ppx  -  Lovenox and SCD    # Full code
PMHx  Sepsis  Hypokalemia ( On 5/18/21 K+ 3.2, now 4.0)  Mild Hyponatremia ( Na+ 1330    PSHx  B/L lumpectomies (10-15 years ago benign pathology)    Pt s/p Right breast biopsy on 5/11, reports to ED with  c/o Right breast pain. Right breast abscess ruptured & began putting out pus & necrotic tissue. In the ED, pt's temp 100.9, tachycardic to 110's, WBC 16.7 & lactate 2.6 ( Received 1950ml Lr, Vancomycin, zosyn, Clindamycin & Augmentin)

## 2021-05-26 NOTE — PROGRESS NOTE ADULT - ASSESSMENT
ASSESSMENT  75F w/ PMH of HTN and b/l lumpectomies (10-15 years ago, reportedly benign pathology) presented to the ED with worsening R breast pain. Pt reports R breast biopsy last week Tuesday, 5/11. She was told to keep the dressing on for 5 days. Since the biopsy, she has been experiencing R breast pain. 2 days ago, she removed the dressing and saw erythema and small amount of necrotic tissue overlying R underside of breast. This morning the abscess ruptured and began putting out pus and necrotic tissue.    IMPRESSION  #Severe Sepsis on admission lactic acidosis due to necrotic R breast abscess    5/24 WCX NG    5/24 BCX NGTD     5/22 WCX pending   s/p debridement 5/22 -- necrotizing soft tissue infection    s/p OR WCX 5/20 --   Growth in fluid media only Coag Negative Staphylococcus- contaminant     Moderate Anaerobic Gram Negative Rocky Most closely resembling    Prevotella species "Susceptibilities not performed"  s/p Exploration, wound, chest 20-May-2021 08:50:47 right Michelle Wagner  Incision and drainage, breast, with debridement 20-May-2021 "extensive necrotizing soft tissue infection occupying more than 2/3rds of her breast, debrided to healthy tissue, packed with kerlex"    UCX   10,000 - 49,000 CFU/mL Escherichia coli (I unasyn, R bactrim)    s/p I&D exudate, purulent drainage, necrotic tissue    MRSA PCR Result.: Negative (05-19-21 @ 10:40)  #CoNS in bcx is a contaminant 1/4 bottles    5/18 1/4 bottles coNS  #Hyponatremia  Creatinine, Serum: 0.8 (05-19-21 @ 05:34)    Weight (kg): 63.3 (05-18-21 @ 16:27)    RECOMMENDATIONS  - f/u final WCX 5/20, 5/22  - continue zosyn 3.375 Gram(s) IV Intermittent every 8 hours , likely D/C on augmentin 875mg BID x 14 days post-op       Please call with any questions or send a message on [employer] Teams  Spectra [phone]

## 2021-05-26 NOTE — PROGRESS NOTE ADULT - ATTENDING COMMENTS
d/w Dr. Slade- senior resident  Pt AF VSS.  Pt NPO for possible serial I&D (Dr. Wagner)  Right breast NPWT in place  WBC 13 down from 15  CT chest reviewed:  -Right breast changes consistent with clinical diagnosis of breast infection. No drainable collections are identified.  -Bilateral pleural effusions with adjacent compressive atelectasis, both lower lobes.. Patent central tracheobronchial tree. platelike atelectasis, lingular segment left upper lobe (4/126).  -5 mm solid subpleural nodule, posterior right apex (4/46). Consider follow-up CT scan 12 months time to assess stability.  -Right-sided diaphragmatic hernia, described above.    -5 mm solid subpleural nodule posterior right apex (4/46).    Recommend Dakins WTD packing BID  c/w abx per ID  pulmonary toilet  DVT ppx  pain control

## 2021-05-26 NOTE — PROGRESS NOTE ADULT - ASSESSMENT
Keep wound vac in place  Pain management  IV Antibiotics  Will need further debridement follow up outpt and future reconstruction  CT chest r/o sinus tract/abscess

## 2021-05-26 NOTE — PROGRESS NOTE ADULT - SUBJECTIVE AND OBJECTIVE BOX
CASSIDY NOE  75y Female   [id number]    Hospital Day: 9  Post Operative Day:2  Procedure:s/p  bedside breast i and d , s/p right breats debridement ,,   Patient is a 75y old  Female who presents with a chief complaint of right breast abscess (25 May 2021 15:46)    PAST MEDICAL & SURGICAL HISTORY:  Hypertension    Rheumatoid arthritis        Events of the Last 24h:  Vital Signs Last 24 Hrs  T(C): 36.3 (25 May 2021 20:42), Max: 37.7 (25 May 2021 05:22)  T(F): 97.3 (25 May 2021 20:42), Max: 99.8 (25 May 2021 05:22)  HR: 83 (25 May 2021 20:42) (65 - 94)  BP: 138/59 (25 May 2021 20:42) (113/56 - 155/70)  BP(mean): --  RR: 18 (25 May 2021 20:42) (18 - 18)  SpO2: 96% (25 May 2021 14:30) (96% - 96%)        Diet, NPO after Midnight:      NPO Start Date: 25-May-2021,   NPO Start Time: 23:59 (21 @ 20:54)  Diet, Regular:   Arthur(7 Gm Arginine/7 Gm Glut/1.2 Gm HMB     Qty per Day:  3  Supplement Feeding Modality:  Oral  Ensure Enlive Cans or Servings Per Day:  3       Frequency:  Three Times a day (21 @ 17:54)      I&O's Summary    24 May 2021 07:01  -  25 May 2021 07:00  --------------------------------------------------------  IN: 350 mL / OUT: 200 mL / NET: 150 mL    25 May 2021 07:01  -  26 May 2021 00:30  --------------------------------------------------------  IN: 350 mL / OUT: 850 mL / NET: -500 mL     I&O's Detail    24 May 2021 07:01  -  25 May 2021 07:00  --------------------------------------------------------  IN:    IV PiggyBack: 350 mL  Total IN: 350 mL    OUT:    Voided (mL): 200 mL  Total OUT: 200 mL    Total NET: 150 mL      25 May 2021 07:01  -  26 May 2021 00:30  --------------------------------------------------------  IN:    IV PiggyBack: 100 mL    Oral Fluid: 250 mL  Total IN: 350 mL    OUT:    Voided (mL): 850 mL  Total OUT: 850 mL    Total NET: -500 mL          MEDICATIONS  (STANDING):  acetaminophen   Tablet .. 650 milliGRAM(s) Oral every 6 hours  chlorhexidine 4% Liquid 1 Application(s) Topical <User Schedule>  dextrose 5% + sodium chloride 0.9%. 1000 milliLiter(s) (75 mL/Hr) IV Continuous <Continuous>  dronabinol 2.5 milliGRAM(s) Oral two times a day  heparin   Injectable 5000 Unit(s) SubCutaneous every 8 hours  lisinopril 20 milliGRAM(s) Oral daily  pantoprazole    Tablet 40 milliGRAM(s) Oral before breakfast  piperacillin/tazobactam IVPB.. 3.375 Gram(s) IV Intermittent every 8 hours  polyethylene glycol 3350 17 Gram(s) Oral daily  senna 2 Tablet(s) Oral at bedtime    MEDICATIONS  (PRN):  ketorolac   Injectable 15 milliGRAM(s) IV Push every 8 hours PRN Moderate Pain (4 - 6)  oxyCODONE    IR 5 milliGRAM(s) Oral every 6 hours PRN Moderate Pain (4 - 6)  traZODone 50 milliGRAM(s) Oral at bedtime PRN insomnia/anxiety      PHYSICAL EXAM:    GENERAL: NAD    HEENT: NCAT    CHEST/LUNGS: CTAB    HEART: RRR,  No murmurs, rubs, or gallops    Left breast: Large breasts. No lesions/masses, no drainage  Right breast: patient with large abscess cavity inferior to nipple spanning about 7cm. Currently with wound vac with minimal drainage in canister set to -75mmHg of suction. areas of erythema and dark tissue of surrounding skin with discoloration. Capillary refill <2s, no fluctuance, very tender to palpation,     ABDOMEN: SNTND +BS    EXTREMITIES:  FROM, No clubbing, cyanosis, or edema, palpable pulse    NEURO: No focal neurological deficits    SKIN: No rashes or lesions    INCISION/WOUNDS:                          8.2    13.66 )-----------( 537      ( 25 May 2021 20:36 )             26.2        CBC Full  -  ( 25 May 2021 20:36 )  WBC Count : 13.66 K/uL  RBC Count : 3.06 M/uL  Hemoglobin : 8.2 g/dL  Hematocrit : 26.2 %  Platelet Count - Automated : 537 K/uL  Mean Cell Volume : 85.6 fL  Mean Cell Hemoglobin : 26.8 pg  Mean Cell Hemoglobin Concentration : 31.3 g/dL  Auto Neutrophil # : 11.22 K/uL  Auto Lymphocyte # : 1.38 K/uL  Auto Monocyte # : 0.75 K/uL  Auto Eosinophil # : 0.13 K/uL  Auto Basophil # : 0.03 K/uL  Auto Neutrophil % : 82.1 %  Auto Lymphocyte % : 10.1 %  Auto Monocyte % : 5.5 %  Auto Eosinophil % : 1.0 %  Auto Basophil % : 0.2 %               136   |  102   |  13                 Ca: 7.5    BMP:   ----------------------------< 181    M.8   (21 @ 20:36)             3.9    |  26    | 0.8                Ph: 2.4      LFT:     TPro: 5.6 / Alb: 3.0 / TBili: 0.5 / DBili: x / AST: 10 / ALT: 14 / AlkPhos: 122   (21 @ 10:30)                Culture - Tissue with Gram Stain (collected 24 May 2021 23:00)  Source: .Tissue None  Gram Stain (25 May 2021 11:48):    Rare polymorphonuclear leukocytes per low power field    No organisms seen per oil power field

## 2021-05-26 NOTE — PROGRESS NOTE ADULT - SUBJECTIVE AND OBJECTIVE BOX
Progress Note: General Surgery  Patient: CASSIDY NOE , 75y (1945)Female   MRN: [id number]  Location: [address]  Visit: 05-18-21 Inpatient  Date: 05-26-21 @ 02:55    Admit Diagnosis/Chief Complaint: Necrotizing soft tissue infection        Procedure/Diagnosis: Necrotizing soft tissue infection     S/P Exploration, wound, chest    Incision and drainage, breast, with debridement    Irrigation and excisional debridement of musculofascial tissue        Events/ 24h: Patient seen and examined at bedside. No acute events overnight. Afebrile, VSS.  poor PO intake, seen by plastic surgery yesterday and therefore wound vac had to be taken down and replaced.      Vitals: T(F): 96.4 (05-26-21 @ 01:11), Max: 99.8 (05-25-21 @ 05:22)  HR: 80 (05-26-21 @ 01:11)  BP: 141/65 (05-26-21 @ 01:11) (113/56 - 155/70)  RR: 18 (05-26-21 @ 01:11)  SpO2: 96% (05-25-21 @ 14:30)    In:   05-24-21 @ 07:01  -  05-25-21 @ 07:00  --------------------------------------------------------  IN: 350 mL    05-25-21 @ 07:01  -  05-26-21 @ 02:55  --------------------------------------------------------  IN: 725 mL      Out:   05-24-21 @ 07:01  -  05-25-21 @ 07:00  --------------------------------------------------------  OUT:    Voided (mL): 200 mL  Total OUT: 200 mL      05-25-21 @ 07:01  -  05-26-21 @ 02:55  --------------------------------------------------------  OUT:    Voided (mL): 1100 mL  Total OUT: 1100 mL        Net:   05-24-21 @ 07:01  -  05-25-21 @ 07:00  --------------------------------------------------------  NET: 150 mL    05-25-21 @ 07:01  -  05-26-21 @ 02:55  --------------------------------------------------------  NET: -375 mL        Diet: Diet, NPO after Midnight:      NPO Start Date: 25-May-2021,   NPO Start Time: 23:59 (05-25-21 @ 20:54)  Diet, Regular:   Arthur(7 Gm Arginine/7 Gm Glut/1.2 Gm HMB     Qty per Day:  3  Supplement Feeding Modality:  Oral  Ensure Enlive Cans or Servings Per Day:  3       Frequency:  Three Times a day (05-24-21 @ 17:54)    IV Fluids: dextrose 5% + sodium chloride 0.9%. 1000 milliLiter(s) (75 mL/Hr) IV Continuous <Continuous>      Physical Examination:  General Appearance: NAD   HEENT: EOMI, sclera anicteric.  Heart: RRR   Lungs: Symmetric chest wall expansion, equal rise and fall.  Abdomen:  Soft, nontender, nondistended.   MSK/Extremities: Warm & well-perfused.   Skin: Warm, dry. No jaundice.   right breast: wound vac in place      Medications: [Standing]  acetaminophen   Tablet .. 650 milliGRAM(s) Oral every 6 hours  chlorhexidine 4% Liquid 1 Application(s) Topical <User Schedule>  dextrose 5% + sodium chloride 0.9%. 1000 milliLiter(s) (75 mL/Hr) IV Continuous <Continuous>  dronabinol 2.5 milliGRAM(s) Oral two times a day  heparin   Injectable 5000 Unit(s) SubCutaneous every 8 hours  lisinopril 20 milliGRAM(s) Oral daily  pantoprazole    Tablet 40 milliGRAM(s) Oral before breakfast  piperacillin/tazobactam IVPB.. 3.375 Gram(s) IV Intermittent every 8 hours  polyethylene glycol 3350 17 Gram(s) Oral daily  senna 2 Tablet(s) Oral at bedtime    DVT Prophylaxis: heparin   Injectable 5000 Unit(s) SubCutaneous every 8 hours    GI Prophylaxis: pantoprazole    Tablet 40 milliGRAM(s) Oral before breakfast    Antibiotics: piperacillin/tazobactam IVPB.. 3.375 Gram(s) IV Intermittent every 8 hours    Anticoagulation:   Medications:[PRN]  ketorolac   Injectable 15 milliGRAM(s) IV Push every 8 hours PRN  oxyCODONE    IR 5 milliGRAM(s) Oral every 6 hours PRN  traZODone 50 milliGRAM(s) Oral at bedtime PRN      Labs:                        8.2    13.66 )-----------( 537      ( 25 May 2021 20:36 )             26.2     05-25    136  |  102  |  13  ----------------------------<  181<H>  3.9   |  26  |  0.8    Ca    7.5<L>      25 May 2021 20:36  Phos  2.4     05-25  Mg     1.8     05-25                  Urine/Micro:    Culture - Tissue with Gram Stain (collected 24 May 2021 23:00)  Source: .Tissue None  Gram Stain (25 May 2021 11:48):    Rare polymorphonuclear leukocytes per low power field    No organisms seen per oil power field

## 2021-05-27 ENCOUNTER — RESULT REVIEW (OUTPATIENT)
Age: 76
End: 2021-05-27

## 2021-05-27 LAB
ANION GAP SERPL CALC-SCNC: 9 MMOL/L — SIGNIFICANT CHANGE UP (ref 7–14)
BASOPHILS # BLD AUTO: 0.03 K/UL — SIGNIFICANT CHANGE UP (ref 0–0.2)
BASOPHILS NFR BLD AUTO: 0.3 % — SIGNIFICANT CHANGE UP (ref 0–1)
BUN SERPL-MCNC: 15 MG/DL — SIGNIFICANT CHANGE UP (ref 10–20)
CALCIUM SERPL-MCNC: 7.9 MG/DL — LOW (ref 8.5–10.1)
CHLORIDE SERPL-SCNC: 102 MMOL/L — SIGNIFICANT CHANGE UP (ref 98–110)
CO2 SERPL-SCNC: 25 MMOL/L — SIGNIFICANT CHANGE UP (ref 17–32)
CREAT SERPL-MCNC: 0.7 MG/DL — SIGNIFICANT CHANGE UP (ref 0.7–1.5)
EOSINOPHIL # BLD AUTO: 0.16 K/UL — SIGNIFICANT CHANGE UP (ref 0–0.7)
EOSINOPHIL NFR BLD AUTO: 1.4 % — SIGNIFICANT CHANGE UP (ref 0–8)
GLUCOSE BLDC GLUCOMTR-MCNC: 127 MG/DL — HIGH (ref 70–99)
GLUCOSE BLDC GLUCOMTR-MCNC: 131 MG/DL — HIGH (ref 70–99)
GLUCOSE BLDC GLUCOMTR-MCNC: 82 MG/DL — SIGNIFICANT CHANGE UP (ref 70–99)
GLUCOSE BLDC GLUCOMTR-MCNC: 99 MG/DL — SIGNIFICANT CHANGE UP (ref 70–99)
GLUCOSE SERPL-MCNC: 79 MG/DL — SIGNIFICANT CHANGE UP (ref 70–99)
HCT VFR BLD CALC: 24.8 % — LOW (ref 37–47)
HGB BLD-MCNC: 7.9 G/DL — LOW (ref 12–16)
IMM GRANULOCYTES NFR BLD AUTO: 0.5 % — HIGH (ref 0.1–0.3)
LYMPHOCYTES # BLD AUTO: 1.88 K/UL — SIGNIFICANT CHANGE UP (ref 1.2–3.4)
LYMPHOCYTES # BLD AUTO: 17 % — LOW (ref 20.5–51.1)
MAGNESIUM SERPL-MCNC: 1.7 MG/DL — LOW (ref 1.8–2.4)
MCHC RBC-ENTMCNC: 27.9 PG — SIGNIFICANT CHANGE UP (ref 27–31)
MCHC RBC-ENTMCNC: 31.9 G/DL — LOW (ref 32–37)
MCV RBC AUTO: 87.6 FL — SIGNIFICANT CHANGE UP (ref 81–99)
MONOCYTES # BLD AUTO: 0.81 K/UL — HIGH (ref 0.1–0.6)
MONOCYTES NFR BLD AUTO: 7.3 % — SIGNIFICANT CHANGE UP (ref 1.7–9.3)
NEUTROPHILS # BLD AUTO: 8.11 K/UL — HIGH (ref 1.4–6.5)
NEUTROPHILS NFR BLD AUTO: 73.5 % — SIGNIFICANT CHANGE UP (ref 42.2–75.2)
NRBC # BLD: 0 /100 WBCS — SIGNIFICANT CHANGE UP (ref 0–0)
PHOSPHATE SERPL-MCNC: 3.1 MG/DL — SIGNIFICANT CHANGE UP (ref 2.1–4.9)
PLATELET # BLD AUTO: 660 K/UL — HIGH (ref 130–400)
POTASSIUM SERPL-MCNC: 4.4 MMOL/L — SIGNIFICANT CHANGE UP (ref 3.5–5)
POTASSIUM SERPL-SCNC: 4.4 MMOL/L — SIGNIFICANT CHANGE UP (ref 3.5–5)
RBC # BLD: 2.83 M/UL — LOW (ref 4.2–5.4)
RBC # FLD: 15.4 % — HIGH (ref 11.5–14.5)
SODIUM SERPL-SCNC: 136 MMOL/L — SIGNIFICANT CHANGE UP (ref 135–146)
WBC # BLD: 11.04 K/UL — HIGH (ref 4.8–10.8)
WBC # FLD AUTO: 11.04 K/UL — HIGH (ref 4.8–10.8)

## 2021-05-27 PROCEDURE — 97605 NEG PRS WND THER DME<=50SQCM: CPT | Mod: 59

## 2021-05-27 PROCEDURE — 88304 TISSUE EXAM BY PATHOLOGIST: CPT | Mod: 26

## 2021-05-27 PROCEDURE — 11042 DBRDMT SUBQ TIS 1ST 20SQCM/<: CPT

## 2021-05-27 PROCEDURE — 97597 DBRDMT OPN WND 1ST 20 CM/<: CPT | Mod: 59

## 2021-05-27 RX ORDER — CHLORHEXIDINE GLUCONATE 213 G/1000ML
1 SOLUTION TOPICAL
Refills: 0 | Status: DISCONTINUED | OUTPATIENT
Start: 2021-05-27 | End: 2021-06-03

## 2021-05-27 RX ORDER — SENNA PLUS 8.6 MG/1
2 TABLET ORAL AT BEDTIME
Refills: 0 | Status: DISCONTINUED | OUTPATIENT
Start: 2021-05-27 | End: 2021-06-03

## 2021-05-27 RX ORDER — PANTOPRAZOLE SODIUM 20 MG/1
40 TABLET, DELAYED RELEASE ORAL
Refills: 0 | Status: DISCONTINUED | OUTPATIENT
Start: 2021-05-27 | End: 2021-06-03

## 2021-05-27 RX ORDER — HYDROMORPHONE HYDROCHLORIDE 2 MG/ML
0.5 INJECTION INTRAMUSCULAR; INTRAVENOUS; SUBCUTANEOUS
Refills: 0 | Status: DISCONTINUED | OUTPATIENT
Start: 2021-05-27 | End: 2021-05-27

## 2021-05-27 RX ORDER — DRONABINOL 2.5 MG
2.5 CAPSULE ORAL
Refills: 0 | Status: COMPLETED | OUTPATIENT
Start: 2021-05-27 | End: 2021-06-03

## 2021-05-27 RX ORDER — PIPERACILLIN AND TAZOBACTAM 4; .5 G/20ML; G/20ML
3.38 INJECTION, POWDER, LYOPHILIZED, FOR SOLUTION INTRAVENOUS EVERY 8 HOURS
Refills: 0 | Status: DISCONTINUED | OUTPATIENT
Start: 2021-05-27 | End: 2021-06-03

## 2021-05-27 RX ORDER — ACETAMINOPHEN 500 MG
650 TABLET ORAL EVERY 6 HOURS
Refills: 0 | Status: DISCONTINUED | OUTPATIENT
Start: 2021-05-27 | End: 2021-06-03

## 2021-05-27 RX ORDER — LISINOPRIL 2.5 MG/1
20 TABLET ORAL DAILY
Refills: 0 | Status: DISCONTINUED | OUTPATIENT
Start: 2021-05-27 | End: 2021-06-03

## 2021-05-27 RX ORDER — MAGNESIUM SULFATE 500 MG/ML
2 VIAL (ML) INJECTION ONCE
Refills: 0 | Status: COMPLETED | OUTPATIENT
Start: 2021-05-27 | End: 2021-05-27

## 2021-05-27 RX ORDER — HEPARIN SODIUM 5000 [USP'U]/ML
5000 INJECTION INTRAVENOUS; SUBCUTANEOUS EVERY 8 HOURS
Refills: 0 | Status: DISCONTINUED | OUTPATIENT
Start: 2021-05-27 | End: 2021-06-03

## 2021-05-27 RX ORDER — POLYETHYLENE GLYCOL 3350 17 G/17G
17 POWDER, FOR SOLUTION ORAL DAILY
Refills: 0 | Status: DISCONTINUED | OUTPATIENT
Start: 2021-05-27 | End: 2021-06-03

## 2021-05-27 RX ORDER — OXYCODONE HYDROCHLORIDE 5 MG/1
5 TABLET ORAL EVERY 6 HOURS
Refills: 0 | Status: DISCONTINUED | OUTPATIENT
Start: 2021-05-27 | End: 2021-06-03

## 2021-05-27 RX ORDER — ONDANSETRON 8 MG/1
4 TABLET, FILM COATED ORAL ONCE
Refills: 0 | Status: DISCONTINUED | OUTPATIENT
Start: 2021-05-27 | End: 2021-05-27

## 2021-05-27 RX ORDER — TRAZODONE HCL 50 MG
50 TABLET ORAL AT BEDTIME
Refills: 0 | Status: DISCONTINUED | OUTPATIENT
Start: 2021-05-27 | End: 2021-06-03

## 2021-05-27 RX ORDER — SODIUM CHLORIDE 9 MG/ML
1000 INJECTION, SOLUTION INTRAVENOUS
Refills: 0 | Status: DISCONTINUED | OUTPATIENT
Start: 2021-05-27 | End: 2021-05-27

## 2021-05-27 RX ADMIN — HYDROMORPHONE HYDROCHLORIDE 0.5 MILLIGRAM(S): 2 INJECTION INTRAMUSCULAR; INTRAVENOUS; SUBCUTANEOUS at 21:34

## 2021-05-27 RX ADMIN — OXYCODONE HYDROCHLORIDE 5 MILLIGRAM(S): 5 TABLET ORAL at 03:19

## 2021-05-27 RX ADMIN — OXYCODONE HYDROCHLORIDE 5 MILLIGRAM(S): 5 TABLET ORAL at 18:48

## 2021-05-27 RX ADMIN — Medication 650 MILLIGRAM(S): at 05:10

## 2021-05-27 RX ADMIN — Medication 15 MILLIGRAM(S): at 04:49

## 2021-05-27 RX ADMIN — PIPERACILLIN AND TAZOBACTAM 25 GRAM(S): 4; .5 INJECTION, POWDER, LYOPHILIZED, FOR SOLUTION INTRAVENOUS at 23:20

## 2021-05-27 RX ADMIN — HYDROMORPHONE HYDROCHLORIDE 0.5 MILLIGRAM(S): 2 INJECTION INTRAMUSCULAR; INTRAVENOUS; SUBCUTANEOUS at 21:22

## 2021-05-27 RX ADMIN — Medication 650 MILLIGRAM(S): at 12:25

## 2021-05-27 RX ADMIN — PIPERACILLIN AND TAZOBACTAM 25 GRAM(S): 4; .5 INJECTION, POWDER, LYOPHILIZED, FOR SOLUTION INTRAVENOUS at 14:57

## 2021-05-27 RX ADMIN — Medication 650 MILLIGRAM(S): at 00:05

## 2021-05-27 RX ADMIN — HEPARIN SODIUM 5000 UNIT(S): 5000 INJECTION INTRAVENOUS; SUBCUTANEOUS at 05:10

## 2021-05-27 RX ADMIN — LISINOPRIL 20 MILLIGRAM(S): 2.5 TABLET ORAL at 05:09

## 2021-05-27 RX ADMIN — PANTOPRAZOLE SODIUM 40 MILLIGRAM(S): 20 TABLET, DELAYED RELEASE ORAL at 05:09

## 2021-05-27 RX ADMIN — OXYCODONE HYDROCHLORIDE 5 MILLIGRAM(S): 5 TABLET ORAL at 01:44

## 2021-05-27 RX ADMIN — Medication 2.5 MILLIGRAM(S): at 05:10

## 2021-05-27 RX ADMIN — SENNA PLUS 2 TABLET(S): 8.6 TABLET ORAL at 23:19

## 2021-05-27 RX ADMIN — OXYCODONE HYDROCHLORIDE 5 MILLIGRAM(S): 5 TABLET ORAL at 12:25

## 2021-05-27 RX ADMIN — Medication 650 MILLIGRAM(S): at 11:25

## 2021-05-27 RX ADMIN — Medication 12.5 GRAM(S): at 04:49

## 2021-05-27 RX ADMIN — Medication 15 MILLIGRAM(S): at 05:45

## 2021-05-27 RX ADMIN — Medication 650 MILLIGRAM(S): at 23:19

## 2021-05-27 RX ADMIN — HEPARIN SODIUM 5000 UNIT(S): 5000 INJECTION INTRAVENOUS; SUBCUTANEOUS at 21:35

## 2021-05-27 RX ADMIN — HEPARIN SODIUM 5000 UNIT(S): 5000 INJECTION INTRAVENOUS; SUBCUTANEOUS at 14:48

## 2021-05-27 RX ADMIN — POLYETHYLENE GLYCOL 3350 17 GRAM(S): 17 POWDER, FOR SOLUTION ORAL at 12:25

## 2021-05-27 RX ADMIN — PIPERACILLIN AND TAZOBACTAM 25 GRAM(S): 4; .5 INJECTION, POWDER, LYOPHILIZED, FOR SOLUTION INTRAVENOUS at 05:10

## 2021-05-27 RX ADMIN — OXYCODONE HYDROCHLORIDE 5 MILLIGRAM(S): 5 TABLET ORAL at 11:28

## 2021-05-27 RX ADMIN — OXYCODONE HYDROCHLORIDE 5 MILLIGRAM(S): 5 TABLET ORAL at 17:54

## 2021-05-27 RX ADMIN — Medication 650 MILLIGRAM(S): at 05:09

## 2021-05-27 NOTE — PRE-ANESTHESIA EVALUATION ADULT - NSANTHRISKNONERD_GEN_ALL_CORE
No risk alerts present

## 2021-05-27 NOTE — PRE-ANESTHESIA EVALUATION ADULT - NSPROPOSEDPROCEDFT_GEN_ALL_CORE
Right Breast Washout
right breast I&D
Right breast wound exploration and debridement wound vac placement
Right breast abscess Incision and Drainage
I & D right breast
Rt breast I&D

## 2021-05-27 NOTE — PRE-ANESTHESIA EVALUATION ADULT - MALLAMPATI CLASS
Class II - visualization of the soft palate, fauces, and uvula
Class I (easy) - visualization of the soft palate, fauces, uvula, and both anterior and posterior pillars
Class II - visualization of the soft palate, fauces, and uvula
Class II - visualization of the soft palate, fauces, and uvula
Class III - visualization of the soft palate and the base of the uvula
Class II - visualization of the soft palate, fauces, and uvula

## 2021-05-27 NOTE — BRIEF OPERATIVE NOTE - NSICDXBRIEFPOSTOP_GEN_ALL_CORE_FT
POST-OP DIAGNOSIS:  Necrotizing soft tissue infection 20-May-2021 08:51:25  Michelle Wagner  

## 2021-05-27 NOTE — PROGRESS NOTE ADULT - SUBJECTIVE AND OBJECTIVE BOX
CASSIDY NOE  75y, Female  Allergy: No Known Allergies      LOS  9d    CHIEF COMPLAINT: right breast abscess (27 May 2021 09:35)      INTERVAL EVENTS/HPI  - No acute events overnight, path with + AFB and filamentous forms  - T(F): , Max: 98.6 (05-26-21 @ 20:57)  - Denies any worsening symptoms  - Tolerating medication  - WBC Count: 10.65 (05-26-21 @ 21:21) downtrending   WBC Count: 13.66 (05-25-21 @ 20:36)  - Creatinine, Serum: 0.9 (05-26-21 @ 21:21)  Creatinine, Serum: 0.8 (05-25-21 @ 20:36)       ROS  General: Denies rigors, nightsweats  HEENT: Denies headache, rhinorrhea, sore throat, eye pain  CV: Denies CP, palpitations  PULM: Denies wheezing, hemoptysis  GI: Denies hematemesis, hematochezia, melena  : Denies discharge, hematuria  MSK: Denies arthralgias, myalgias  SKIN: Denies rash, lesions  NEURO: Denies paresthesias, weakness  PSYCH: Denies depression, anxiety    VITALS:  T(F): 98.5, Max: 98.6 (05-26-21 @ 20:57)  HR: 79  BP: 137/60  RR: 18Vital Signs Last 24 Hrs  T(C): 36.9 (27 May 2021 13:00), Max: 37 (26 May 2021 20:57)  T(F): 98.5 (27 May 2021 13:00), Max: 98.6 (26 May 2021 20:57)  HR: 79 (27 May 2021 13:00) (79 - 86)  BP: 137/60 (27 May 2021 13:00) (118/51 - 140/64)  BP(mean): --  RR: 18 (27 May 2021 13:00) (18 - 18)  SpO2: --    PHYSICAL EXAM:  Gen: NAD, resting in bed  HEENT: Normocephalic, atraumatic  Neck: supple, no lymphadenopathy  CV: Regular rate & regular rhythm  Lungs: decreased BS at bases, no fremitus  R breast vac  Abdomen: Soft, BS present  Ext: Warm, well perfused  Neuro: non focal, awake  Skin: no rash, no erythema  Lines: no phlebitis    FH: Non-contributory  Social Hx: Non-contributory    TESTS & MEASUREMENTS:                        7.3    10.65 )-----------( 522      ( 26 May 2021 21:21 )             22.6     05-26    137  |  102  |  25<H>  ----------------------------<  103<H>  4.1   |  27  |  0.9    Ca    7.8<L>      26 May 2021 21:21  Phos  2.6     05-26  Mg     1.6     05-26      eGFR if Non African American: 62 mL/min/1.73M2 (05-26-21 @ 21:21)  eGFR if : 71 mL/min/1.73M2 (05-26-21 @ 21:21)          Culture - Tissue with Gram Stain (collected 05-24-21 @ 23:00)  Source: .Tissue None  Gram Stain (05-25-21 @ 11:48):    Rare polymorphonuclear leukocytes per low power field    No organisms seen per oil power field  Preliminary Report (05-26-21 @ 12:36):    No growth to date.    Culture - Blood (collected 05-24-21 @ 21:30)  Source: .Blood None  Preliminary Report (05-26-21 @ 07:01):    No growth to date.    Culture - Tissue with Gram Stain (collected 05-22-21 @ 14:00)  Source: .Tissue None  Gram Stain (05-23-21 @ 02:35):    Few polymorphonuclear leukocytes seen per low power field    Few Gram positive cocci in pairs seen per oil power field    Rare Gram Variable Rods seen per oil power field    Culture - Tissue with Gram Stain (collected 05-20-21 @ 10:44)  Source: .Tissue None  Gram Stain (05-20-21 @ 21:54):    Rare polymorphonuclear leukocytes seen per low power field    Rare Gram Variable Rods seen per oil power field    Rare Gram positive cocci in pairs seen per oil power field  Final Report (05-23-21 @ 15:38):    Growth in fluid media only Coag Negative Staphylococcus    Moderate Anaerobic Gram Negative Rocky Most closely resembling    Prevotella species "Susceptibilities not performed"  Organism: Coag Negative Staphylococcus (05-23-21 @ 15:38)  Organism: Coag Negative Staphylococcus (05-23-21 @ 15:38)      -  Ampicillin/Sulbactam: R <=8/4      -  Cefazolin: R <=4      -  Clindamycin: R >4      -  Erythromycin: R >4      -  Gentamicin: S <=1 Should not be used as monotherapy      -  Oxacillin: R >2      -  Penicillin: R >8      -  RIF- Rifampin: S <=1 Should not be used as monotherapy      -  Tetra/Doxy: S <=1      -  Trimethoprim/Sulfamethoxazole: S <=0.5/9.5      -  Vancomycin: S 2      Method Type: RA    Culture - Urine (collected 05-18-21 @ 13:17)  Source: .Urine Clean Catch (Midstream)  Final Report (05-22-21 @ 09:44):    10,000 - 49,000 CFU/mL Escherichia coli    <10,000 CFU/ml Normal Urogenital johanna present  Organism: Escherichia coli (05-22-21 @ 09:44)  Organism: Escherichia coli (05-22-21 @ 09:44)      -  Amikacin: S <=16      -  Amoxicillin/Clavulanic Acid: S <=8/4      -  Ampicillin: R >16 These ampicillin results predict results for amoxicillin      -  Ampicillin/Sulbactam: I 16/8 Enterobacter, Citrobacter, and Serratia may develop resistance during prolonged therapy (3-4 days)      -  Aztreonam: S <=4      -  Cefazolin: S <=2 (MIC_CL_COM_ENTERIC_CEFAZU) For uncomplicated UTI with K. pneumoniae, E. coli, or P. mirablis: RA <=16 is sensitive and RA >=32 is resistant. This also predicts results for oral agents cefaclor, cefdinir, cefpodoxime, cefprozil, cefuroxime axetil, cephalexin and locarbef for uncomplicated UTI. Note that some isolates may be susceptible to these agents while testing resistant to cefazolin.      -  Cefepime: S <=2      -  Cefoxitin: S <=8      -  Ceftriaxone: S <=1 Enterobacter, Citrobacter, and Serratia may develop resistance during prolonged therapy      -  Ciprofloxacin: S <=0.25      -  Ertapenem: S <=0.5      -  Gentamicin: S <=2      -  Imipenem: S <=1      -  Levofloxacin: S <=0.5      -  Meropenem: S <=1      -  Nitrofurantoin: S <=32 Should not be used to treat pyelonephritis      -  Piperacillin/Tazobactam: S <=8      -  Tigecycline: S <=2      -  Tobramycin: S <=2      -  Trimethoprim/Sulfamethoxazole: R >2/38      Method Type: RA    Culture - Blood (collected 05-18-21 @ 11:00)  Source: .Blood Blood-Peripheral  Gram Stain (05-20-21 @ 21:44):    Growth in anaerobic bottle: Gram Positive Cocci in Clusters  Final Report (05-21-21 @ 18:02):    Growth in anaerobic bottle: Staphylococcus cohnii    Coag Negative Staphylococcus    Single set isolate, possible contaminant. Contact    Microbiology if susceptibility testing clinically    indicated.    ***Blood Panel PCR results on this specimen are available    approximately 3 hours after the Gram stain result.***    Gram stain, PCR, and/or culture results may not always    correspond due to difference in methodologies.    ************************************************************    This PCR assay was performed by multiplex PCR. This    Assay tests for 66 bacterial and resistance gene targets.    Please refer to the [facility] [employer] test directory    at [email or web address] for details.  Organism: Blood Culture PCR (05-21-21 @ 18:02)  Organism: Blood Culture PCR (05-21-21 @ 18:02)      -  Coagulase negative Staphylococcus: Detec      Method Type: PCR    Culture - Blood (collected 05-18-21 @ 11:00)  Source: .Blood Blood-Peripheral  Final Report (05-23-21 @ 23:00):    No Growth Final            INFECTIOUS DISEASES TESTING  COVID-19 PCR: NotDetec (05-26-21 @ 15:26)  COVID-19 PCR: NotDetec (05-23-21 @ 21:33)  COVID-19 PCR: NotDetec (05-21-21 @ 23:12)  MRSA PCR Result.: Negative (05-19-21 @ 10:40)  COVID-19 PCR: NotDetec (05-18-21 @ 10:14)      INFLAMMATORY MARKERS  Sedimentation Rate, Erythrocyte: 85 mm/Hr (05-21-21 @ 20:40)  C-Reactive Protein, Serum: 80 mg/L (05-21-21 @ 20:40)      RADIOLOGY & ADDITIONAL TESTS:  I have personally reviewed the last available Chest xray  CXR      CT  CT Chest w/ IV Cont:   EXAM:  CT CHEST IC            PROCEDURE DATE:  05/25/2021            INTERPRETATION:  Reason for study: Breast infection    Technique: On the day of this examination, an IV cannula was placed in the radiology department.  Approximately 100 cc of Optiray 320 was administered intravenously and multiple transaxial images of the thorax was obtained.  0 cc of contrast material was discarded.    Coronal and sagittal reformatted images were performed to better evaluate anatomic relationships and for possible preoperative planning.        Comparison: CT thorax none.    Findings:  Tubes/lines:none    Central airways/ Lung parenchyma/ pleura :  Patent central tracheobronchial tree. bilateral small pleural effusions with adjacent compressive atelectasis, both lower lobes. platelike atelectasis, lingular segment left upper lobe (4/126). no parenchymal mass, bronchiectasis or honeycombing..    Pulmonary Nodules:  5 mm solid subpleural nodule posterior right apex (4/46).    Chest wall/axilla: Extensive skin thickening with soft tissue defect, right breast. Scattered air bubbles within the breast parenchyma is noted consistent with clinical diagnosis of breast infection. No drainable breast collections are identified.    No axillary adenopathy.    Mediastinum/Great vesselsno adenopathy or mass. Great vessels are normal in caliber. Coronary artery and aortic calcifications are present. No pulmonary emboli.    Upper abdomen : Liver herniation through anterior right diaphragm is noted (603/58, 4/170). Right fat-containing Bochdalek hernia (4/196).    Osseous structures:Degenerative changes thoracic spine. No destructive osseous lesions.      Impression:    Right breast changes consistent with clinical diagnosis of breast infection. No drainable collections are identified.    Bilateral pleural effusions with adjacent compressive atelectasis, both lower lobes.. Patent central tracheobronchial tree.  platelike atelectasis, lingular segment left upper lobe (4/126).      5 mm solid subpleural nodule, posterior right apex (4/46). Consider follow-up CT scan 12 months time to assess stability.      Right-sided diaphragmatic hernia, described above...      5 mm solid subpleural nodule posterior right apex (4/46).                    KAROLINA ESPITIA MD; Attending Radiologist  This document has been electronically signed. May 26 2021  8:43AM (05-25-21 @ 17:39)      CARDIOLOGY TESTING  12 Lead ECG:   Ventricular Rate 89 BPM    Atrial Rate 89 BPM    P-R Interval 150 ms    QRS Duration 90 ms    Q-T Interval 366 ms    QTC Calculation(Bazett) 445 ms    P Axis 75 degrees    R Axis 78 degrees    T Axis 70 degrees    Diagnosis Line Normal sinus rhythm  Normal ECG    Confirmed by KENDRA HOLT MD ([id number]) on 5/18/2021 3:22:53 PM (05-18-21 @ 14:27)  12 Lead ECG:   Ventricular Rate 92 BPM    Atrial Rate 92 BPM    P-R Interval 142 ms    QRS Duration 78 ms    Q-T Interval 358 ms    QTC Calculation(Bazett) 442 ms    P Axis 75 degrees    R Axis 65 degrees    T Axis 67 degrees    Diagnosis Line Sinus rhythm with Premature atrial complexes  ST elevation, consider early repolarization  Borderline ECG    Confirmed by KENDRA HOLT MD ([id number]) on 5/18/2021 12:58:02 PM (05-18-21 @ 11:16)      MEDICATIONS  acetaminophen   Tablet .. 650 Oral every 6 hours  chlorhexidine 4% Liquid 1 Topical <User Schedule>  dronabinol 2.5 Oral two times a day  heparin   Injectable 5000 SubCutaneous every 8 hours  lisinopril 20 Oral daily  pantoprazole    Tablet 40 Oral before breakfast  piperacillin/tazobactam IVPB.. 3.375 IV Intermittent every 8 hours  polyethylene glycol 3350 17 Oral daily  senna 2 Oral at bedtime      WEIGHT  Weight (kg): 65.1 (05-26-21 @ 18:06)  Creatinine, Serum: 0.9 mg/dL (05-26-21 @ 21:21)      ANTIBIOTICS:  piperacillin/tazobactam IVPB.. 3.375 Gram(s) IV Intermittent every 8 hours      All available historical records have been reviewed

## 2021-05-27 NOTE — PRE-OP CHECKLIST - SELECT TESTS ORDERED
Results in MD note
BMP/CBC/Type and Screen/COVID-19
BMP/CBC/Type and Screen/COVID-19
BMP/CBC/PT/PTT/INR/Urinalysis/EKG/COVID-19

## 2021-05-27 NOTE — PRE-ANESTHESIA EVALUATION ADULT - NSANTHOSAYNRD_GEN_A_CORE
No. NESTOR screening performed.  STOP BANG Legend: 0-2 = LOW Risk; 3-4 = INTERMEDIATE Risk; 5-8 = HIGH Risk

## 2021-05-27 NOTE — BRIEF OPERATIVE NOTE - OPERATION/FINDINGS
necrotizing soft tissue infection, tissue culture sent
extensive necrotizing soft tissue infection occupying more than 2/3rds of her breast, debrided to healthy tissue, packed with kerlex; appearnace suspicious for psuedomonas infection
right breast wound debridement, tissue cultures sent, wound vac applied (75mmHg negative pressure)
AFB cultures sent, wound debrided to healthy tissue

## 2021-05-27 NOTE — BRIEF OPERATIVE NOTE - NSICDXBRIEFPROCEDURE_GEN_ALL_CORE_FT
PROCEDURES:  Exploration, wound, chest 20-May-2021 08:50:47 right Michelle Wagner  Incision and drainage, breast, with debridement 20-May-2021 08:51:08  Michelle Wagner  
PROCEDURES:  Exploration, wound, chest 20-May-2021 08:50:47 right breast Michelle Wagner  Incision and drainage, breast, with debridement 20-May-2021 08:51:08  Michelle Wagner  Irrigation and excisional debridement of musculofascial tissue 22-May-2021 09:04:56 right breast Michelle Wagner  

## 2021-05-27 NOTE — PROGRESS NOTE ADULT - ASSESSMENT
ASSESSMENT  75F w/ PMH of HTN and b/l lumpectomies (10-15 years ago, reportedly benign pathology) presented to the ED with worsening R breast pain. Pt reports R breast biopsy last week Tuesday, 5/11. She was told to keep the dressing on for 5 days. Since the biopsy, she has been experiencing R breast pain. 2 days ago, she removed the dressing and saw erythema and small amount of necrotic tissue overlying R underside of breast. This morning the abscess ruptured and began putting out pus and necrotic tissue.    IMPRESSION  #Resolved, Severe Sepsis on admission lactic acidosis due to necrotic/necrotizing fasciitis R breast abscess after biopsy, Pathology with + AFB and filamentous forms ?actinomyces ?nocardia ?rapid growing mycobacteria     5/24 WCX NG    5/24 BCX NGTD     5/22 WCX pending   s/p debridement 5/22 -- necrotizing soft tissue infection    s/p OR WCX 5/20 --   Growth in fluid media only Coag Negative Staphylococcus- contaminant     Moderate Anaerobic Gram Negative Rocky Most closely resembling    Prevotella species "Susceptibilities not performed"  s/p Exploration, wound, chest 20-May-2021 08:50:47 right Michelle Wagner  Incision and drainage, breast, with debridement 20-May-2021 "extensive necrotizing soft tissue infection occupying more than 2/3rds of her breast, debrided to healthy tissue, packed with kerlex"    UCX   10,000 - 49,000 CFU/mL Escherichia coli (I unasyn, R bactrim)    s/p I&D exudate, purulent drainage, necrotic tissue    MRSA PCR Result.: Negative (05-19-21 @ 10:40)  #CoNS in bcx is a contaminant 1/4 bottles    5/18 1/4 bottles coNS  #Hyponatremia  Creatinine, Serum: 0.8 (05-19-21 @ 05:34)    Weight (kg): 63.3 (05-18-21 @ 16:27)    RECOMMENDATIONS  - PLAN for OR today- please send AFB stain/culture and MODIFIED acid fast stain and culture for NOCARDIA stain  - f/u final WCX 5/20, 5/22  - continue zosyn 3.375 Gram(s) IV Intermittent every 8 hours, pending final cultures will decide regimen  - Surgical Pathology Report:  Benign ulcerated/denuded skin and subcutaneous adipose tissue with coagulative necrosis, marked congestion, and acute  inflammation with abscess formation.- A mixed bacterial and mycobacterial colonization is present consisting of Gram (+) cocci in clusters, both Gram (+) and Gram  (-) bacilli seen singly and in filamentous forms, and scattered AFB (+) bacillus.(05.20.21 @ 08:22)  - No OR AFB sent initially  - Need to f/u final cultures- if actinomyces, needs longer treatment as well  - Will discuss trying to perform PCR testing on AFB with Pathology if OR cx today NG    Please call with any questions or send a message on [employer] Teams  Spectra [phone]

## 2021-05-27 NOTE — BRIEF OPERATIVE NOTE - NSICDXBRIEFPREOP_GEN_ALL_CORE_FT
PRE-OP DIAGNOSIS:  Necrotizing soft tissue infection 20-May-2021 08:51:21  Michelle Wagner  

## 2021-05-27 NOTE — CHART NOTE - NSCHARTNOTEFT_GEN_A_CORE
PACU ANESTHESIA ADMISSION NOTE      Procedure: Exploration, wound, chest  right breast    Incision and drainage, breast, with debridement    Irrigation and excisional debridement of musculofascial tissue  right breast      Post op diagnosis:  Necrotizing soft tissue infection      _x___  Patent Airway    _x___  Full return of protective reflexes    _x___  Full recovery from anesthesia / back to baseline status    Vitals:  T(C): 97.2F  HR: 80  BP: 140/93  RR: 14  SpO2: 100%    Mental Status:  _x___ Awake   ___x__ Alert   _____ Drowsy   _____ Sedated    Nausea/Vomiting:  _x___  NO       ______Yes,   See Post - Op Orders         Pain Scale (0-10):  _____    Treatment: ____ None    __x__ See Post - Op/PCA Orders    Post - Operative Fluids:   ___ Oral   __x__ See Post - Op Orders    Plan: Discharge:   ____Home       ___x__Floor     _____Critical Care    _____  Other:_________________    Comments:  No anesthesia issues or complications noted.  Discharge when criteria met.

## 2021-05-27 NOTE — PRE-OP CHECKLIST - BP NONINVASIVE SYSTOLIC (MM HG)
"Chief Complaint   Patient presents with     Derm Problem     Pt present to clinic today for a rash on his armpits and waste that he has had for two days.    Initial /78 (BP Location: Right arm, Patient Position: Sitting, Cuff Size: Adult Regular)  Pulse 72  Temp 98.2  F (36.8  C) (Tympanic)  Wt 187 lb 14.4 oz (85.2 kg)  BMI 26.21 kg/m2 Estimated body mass index is 26.21 kg/(m^2) as calculated from the following:    Height as of 8/16/15: 5' 11\" (1.803 m).    Weight as of this encounter: 187 lb 14.4 oz (85.2 kg).  Medication Reconciliation: complete    Tea Pulliam LPN  "
131
132
178
139

## 2021-05-27 NOTE — PROGRESS NOTE ADULT - SUBJECTIVE AND OBJECTIVE BOX
Progress Note: General Surgery  Patient: CASSIDY NOE , 75y (1945)Female   MRN: [id number]  Location: [address]  Visit: 05-18-21 Inpatient  Date: 05-27-21 @ 09:36    Admit Diagnosis/Chief Complaint: Necrotizing soft tissue infection        Procedure/Diagnosis: Necrotizing soft tissue infection     S/P Exploration, wound, chest    Incision and drainage, breast, with debridement    Irrigation and excisional debridement of musculofascial tissue        Events/ 24h: Patient seen and examined at bedside. No acute events overnight. Afebrile, VSS.    Vitals: T(F): 97 (05-27-21 @ 09:00), Max: 98.6 (05-26-21 @ 13:00)  HR: 84 (05-27-21 @ 09:00)  BP: 134/62 (05-27-21 @ 09:00) (117/58 - 140/64)  RR: 18 (05-27-21 @ 09:00)  SpO2: --    In:   05-26-21 @ 07:01  -  05-27-21 @ 07:00  --------------------------------------------------------  IN: 626 mL    05-27-21 @ 07:01  -  05-27-21 @ 09:36  --------------------------------------------------------  IN: 0 mL      Out:   05-26-21 @ 07:01  -  05-27-21 @ 07:00  --------------------------------------------------------  OUT:    Voided (mL): 1250 mL  Total OUT: 1250 mL      05-27-21 @ 07:01  -  05-27-21 @ 09:36  --------------------------------------------------------  OUT:    Voided (mL): 250 mL  Total OUT: 250 mL        Net:   05-26-21 @ 07:01  -  05-27-21 @ 07:00  --------------------------------------------------------  NET: -624 mL    05-27-21 @ 07:01  -  05-27-21 @ 09:36  --------------------------------------------------------  NET: -250 mL        Diet: Diet, NPO after Midnight:      NPO Start Date: 26-May-2021,   NPO Start Time: 23:59 (05-26-21 @ 10:23)  Diet, Regular:   Arthur(7 Gm Arginine/7 Gm Glut/1.2 Gm HMB     Qty per Day:  3  Supplement Feeding Modality:  Oral  Ensure Enlive Cans or Servings Per Day:  3       Frequency:  Three Times a day (05-24-21 @ 17:54)        Physical Examination:  General Appearance: NAD   HEENT: EOMI, sclera anicteric.  Heart: RRR   Lungs: Symmetric chest wall expansion, equal rise and fall.  Abdomen:  Soft, nontender, nondistended.   MSK/Extremities: Warm & well-perfused.   Skin: Warm, dry. No jaundice.   Right breast: wound vac in place      Medications: [Standing]  acetaminophen   Tablet .. 650 milliGRAM(s) Oral every 6 hours  chlorhexidine 4% Liquid 1 Application(s) Topical <User Schedule>  dronabinol 2.5 milliGRAM(s) Oral two times a day  heparin   Injectable 5000 Unit(s) SubCutaneous every 8 hours  lisinopril 20 milliGRAM(s) Oral daily  pantoprazole    Tablet 40 milliGRAM(s) Oral before breakfast  piperacillin/tazobactam IVPB.. 3.375 Gram(s) IV Intermittent every 8 hours  polyethylene glycol 3350 17 Gram(s) Oral daily  senna 2 Tablet(s) Oral at bedtime    DVT Prophylaxis: heparin   Injectable 5000 Unit(s) SubCutaneous every 8 hours    GI Prophylaxis: pantoprazole    Tablet 40 milliGRAM(s) Oral before breakfast    Antibiotics: piperacillin/tazobactam IVPB.. 3.375 Gram(s) IV Intermittent every 8 hours    Anticoagulation:   Medications:[PRN]  ketorolac   Injectable 15 milliGRAM(s) IV Push every 8 hours PRN  oxyCODONE    IR 5 milliGRAM(s) Oral every 6 hours PRN  traZODone 50 milliGRAM(s) Oral at bedtime PRN      Labs:                        7.3    10.65 )-----------( 522      ( 26 May 2021 21:21 )             22.6     05-26    137  |  102  |  25<H>  ----------------------------<  103<H>  4.1   |  27  |  0.9    Ca    7.8<L>      26 May 2021 21:21  Phos  2.6     05-26  Mg     1.6     05-26        PT/INR - ( 26 May 2021 21:21 )   PT: 12.80 sec;   INR: 1.11 ratio         PTT - ( 26 May 2021 21:21 )  PTT:25.7 sec          Urine/Micro:    Culture - Tissue with Gram Stain (collected 24 May 2021 23:00)  Source: .Tissue None  Gram Stain (25 May 2021 11:48):    Rare polymorphonuclear leukocytes per low power field    No organisms seen per oil power field  Preliminary Report (26 May 2021 12:36):    No growth to date.    Culture - Blood (collected 24 May 2021 21:30)  Source: .Blood None  Preliminary Report (26 May 2021 07:01):    No growth to date.          Imaging:   < from: CT Chest w/ IV Cont (05.25.21 @ 17:39) >  Impression:    Right breast changes consistent with clinical diagnosis of breast infection. No drainable collections are identified.    Bilateral pleural effusions with adjacent compressive atelectasis, both lower lobes.. Patent central tracheobronchial tree.  platelike atelectasis, lingular segment left upper lobe (4/126).      5 mm solid subpleural nodule, posterior right apex (4/46). Consider follow-up CT scan 12 months time to assess stability.      Right-sided diaphragmatic hernia, described above...    5 mm solid subpleural nodule posterior right apex (4/46).    < end of copied text >

## 2021-05-27 NOTE — PROGRESS NOTE ADULT - ASSESSMENT
75y Female patient s/p multiple debridements of R breast in OR. 5/20, 5/22, 5/24  Patient seen and examined at bedside. NAD.   Tolerating:  Diet, NPO after Midnight:      NPO Start Date: 26-May-2021,   NPO Start Time: 23:59 (05-26-21 @ 10:23)  Diet, Regular:   Arthur(7 Gm Arginine/7 Gm Glut/1.2 Gm HMB     Qty per Day:  3  Supplement Feeding Modality:  Oral  Ensure Enlive Cans or Servings Per Day:  3       Frequency:  Three Times a day (05-24-21 @ 17:54)    Plan:  - OR today for debridement and replacement of wound vac  - Monitor vitals  - Monitor labs and replete as necessary  - Monitor for bowel function  - Continue Pain Medications if necessary  - Continue Antibiotics if necessary, WBC continues to downtrend  - Encourage ambulation as tolerated  - Monitor urine output  - DVT and GI Prophylaxis  - dispo planning to include home wound vac  - patient should f/u with pulmonology after D/c for pulmonary nodule seen on CT chest      Date/Time: 05-27-21 @ 09:36

## 2021-05-28 LAB
ANION GAP SERPL CALC-SCNC: 7 MMOL/L — SIGNIFICANT CHANGE UP (ref 7–14)
BASOPHILS # BLD AUTO: 0.02 K/UL — SIGNIFICANT CHANGE UP (ref 0–0.2)
BASOPHILS NFR BLD AUTO: 0.2 % — SIGNIFICANT CHANGE UP (ref 0–1)
BUN SERPL-MCNC: 17 MG/DL — SIGNIFICANT CHANGE UP (ref 10–20)
CALCIUM SERPL-MCNC: 7.9 MG/DL — LOW (ref 8.5–10.1)
CHLORIDE SERPL-SCNC: 103 MMOL/L — SIGNIFICANT CHANGE UP (ref 98–110)
CO2 SERPL-SCNC: 26 MMOL/L — SIGNIFICANT CHANGE UP (ref 17–32)
CREAT SERPL-MCNC: 1 MG/DL — SIGNIFICANT CHANGE UP (ref 0.7–1.5)
EOSINOPHIL # BLD AUTO: 0.08 K/UL — SIGNIFICANT CHANGE UP (ref 0–0.7)
EOSINOPHIL NFR BLD AUTO: 0.7 % — SIGNIFICANT CHANGE UP (ref 0–8)
GLUCOSE BLDC GLUCOMTR-MCNC: 111 MG/DL — HIGH (ref 70–99)
GLUCOSE BLDC GLUCOMTR-MCNC: 116 MG/DL — HIGH (ref 70–99)
GLUCOSE BLDC GLUCOMTR-MCNC: 143 MG/DL — HIGH (ref 70–99)
GLUCOSE SERPL-MCNC: 129 MG/DL — HIGH (ref 70–99)
HCT VFR BLD CALC: 22.7 % — LOW (ref 37–47)
HGB BLD-MCNC: 7.2 G/DL — LOW (ref 12–16)
IMM GRANULOCYTES NFR BLD AUTO: 0.6 % — HIGH (ref 0.1–0.3)
LYMPHOCYTES # BLD AUTO: 1.95 K/UL — SIGNIFICANT CHANGE UP (ref 1.2–3.4)
LYMPHOCYTES # BLD AUTO: 18 % — LOW (ref 20.5–51.1)
MAGNESIUM SERPL-MCNC: 1.7 MG/DL — LOW (ref 1.8–2.4)
MCHC RBC-ENTMCNC: 27.9 PG — SIGNIFICANT CHANGE UP (ref 27–31)
MCHC RBC-ENTMCNC: 31.7 G/DL — LOW (ref 32–37)
MCV RBC AUTO: 88 FL — SIGNIFICANT CHANGE UP (ref 81–99)
MONOCYTES # BLD AUTO: 0.9 K/UL — HIGH (ref 0.1–0.6)
MONOCYTES NFR BLD AUTO: 8.3 % — SIGNIFICANT CHANGE UP (ref 1.7–9.3)
NEUTROPHILS # BLD AUTO: 7.84 K/UL — HIGH (ref 1.4–6.5)
NEUTROPHILS NFR BLD AUTO: 72.2 % — SIGNIFICANT CHANGE UP (ref 42.2–75.2)
NRBC # BLD: 0 /100 WBCS — SIGNIFICANT CHANGE UP (ref 0–0)
PHOSPHATE SERPL-MCNC: 3.1 MG/DL — SIGNIFICANT CHANGE UP (ref 2.1–4.9)
PLATELET # BLD AUTO: 673 K/UL — HIGH (ref 130–400)
POTASSIUM SERPL-MCNC: 4.4 MMOL/L — SIGNIFICANT CHANGE UP (ref 3.5–5)
POTASSIUM SERPL-SCNC: 4.4 MMOL/L — SIGNIFICANT CHANGE UP (ref 3.5–5)
RBC # BLD: 2.58 M/UL — LOW (ref 4.2–5.4)
RBC # FLD: 15.7 % — HIGH (ref 11.5–14.5)
SODIUM SERPL-SCNC: 136 MMOL/L — SIGNIFICANT CHANGE UP (ref 135–146)
WBC # BLD: 10.86 K/UL — HIGH (ref 4.8–10.8)
WBC # FLD AUTO: 10.86 K/UL — HIGH (ref 4.8–10.8)

## 2021-05-28 RX ORDER — MAGNESIUM SULFATE 500 MG/ML
2 VIAL (ML) INJECTION ONCE
Refills: 0 | Status: COMPLETED | OUTPATIENT
Start: 2021-05-28 | End: 2021-05-28

## 2021-05-28 RX ADMIN — OXYCODONE HYDROCHLORIDE 5 MILLIGRAM(S): 5 TABLET ORAL at 13:18

## 2021-05-28 RX ADMIN — Medication 2.5 MILLIGRAM(S): at 09:43

## 2021-05-28 RX ADMIN — PIPERACILLIN AND TAZOBACTAM 25 GRAM(S): 4; .5 INJECTION, POWDER, LYOPHILIZED, FOR SOLUTION INTRAVENOUS at 13:17

## 2021-05-28 RX ADMIN — Medication 2.5 MILLIGRAM(S): at 16:31

## 2021-05-28 RX ADMIN — PIPERACILLIN AND TAZOBACTAM 25 GRAM(S): 4; .5 INJECTION, POWDER, LYOPHILIZED, FOR SOLUTION INTRAVENOUS at 21:10

## 2021-05-28 RX ADMIN — SENNA PLUS 2 TABLET(S): 8.6 TABLET ORAL at 21:11

## 2021-05-28 RX ADMIN — PANTOPRAZOLE SODIUM 40 MILLIGRAM(S): 20 TABLET, DELAYED RELEASE ORAL at 09:43

## 2021-05-28 RX ADMIN — OXYCODONE HYDROCHLORIDE 5 MILLIGRAM(S): 5 TABLET ORAL at 20:49

## 2021-05-28 RX ADMIN — Medication 650 MILLIGRAM(S): at 12:00

## 2021-05-28 RX ADMIN — Medication 650 MILLIGRAM(S): at 18:04

## 2021-05-28 RX ADMIN — HEPARIN SODIUM 5000 UNIT(S): 5000 INJECTION INTRAVENOUS; SUBCUTANEOUS at 21:11

## 2021-05-28 RX ADMIN — PIPERACILLIN AND TAZOBACTAM 25 GRAM(S): 4; .5 INJECTION, POWDER, LYOPHILIZED, FOR SOLUTION INTRAVENOUS at 05:13

## 2021-05-28 RX ADMIN — Medication 650 MILLIGRAM(S): at 05:12

## 2021-05-28 RX ADMIN — HEPARIN SODIUM 5000 UNIT(S): 5000 INJECTION INTRAVENOUS; SUBCUTANEOUS at 13:19

## 2021-05-28 RX ADMIN — OXYCODONE HYDROCHLORIDE 5 MILLIGRAM(S): 5 TABLET ORAL at 13:50

## 2021-05-28 RX ADMIN — OXYCODONE HYDROCHLORIDE 5 MILLIGRAM(S): 5 TABLET ORAL at 04:38

## 2021-05-28 RX ADMIN — Medication 650 MILLIGRAM(S): at 11:30

## 2021-05-28 RX ADMIN — Medication 650 MILLIGRAM(S): at 17:12

## 2021-05-28 RX ADMIN — Medication 50 GRAM(S): at 05:15

## 2021-05-28 RX ADMIN — HEPARIN SODIUM 5000 UNIT(S): 5000 INJECTION INTRAVENOUS; SUBCUTANEOUS at 05:13

## 2021-05-28 RX ADMIN — CHLORHEXIDINE GLUCONATE 1 APPLICATION(S): 213 SOLUTION TOPICAL at 05:13

## 2021-05-28 RX ADMIN — LISINOPRIL 20 MILLIGRAM(S): 2.5 TABLET ORAL at 05:12

## 2021-05-28 RX ADMIN — OXYCODONE HYDROCHLORIDE 5 MILLIGRAM(S): 5 TABLET ORAL at 06:52

## 2021-05-28 RX ADMIN — POLYETHYLENE GLYCOL 3350 17 GRAM(S): 17 POWDER, FOR SOLUTION ORAL at 11:31

## 2021-05-28 NOTE — PROGRESS NOTE ADULT - SUBJECTIVE AND OBJECTIVE BOX
Progress Note: Surgery  Patient: CASSIDY NOE , 75y (1945)Female   MRN: [id number]  Location: [address]  Visit: 05-18-21 Inpatient  Date: 05-28-21 @ 07:52    Events over 24h: POD 1 s/p RTOR for redebridement. No acute event overnight. No new complaint. Pt is hemodynamically stable. On diet, tolerating well. Denies nausea, vomiting, and/or abdominal pain.     Vitals: T(F): 96.6 (05-28-21 @ 04:40), Max: 98.5 (05-27-21 @ 13:00)  HR: 74 (05-28-21 @ 04:40)  BP: 129/60 (05-28-21 @ 04:40) (116/58 - 158/66)  RR: 18 (05-28-21 @ 04:40)  SpO2: 95% (05-28-21 @ 05:35)      Diet: Diet, Regular:   DASH/TLC Sodium & Cholesterol Restricted (05-27-21 @ 21:53)    IV Fluid:     In:   05-27-21 @ 07:01  -  05-28-21 @ 07:00  --------------------------------------------------------  IN: 518 mL    Out:   05-27-21 @ 07:01  -  05-28-21 @ 07:00  --------------------------------------------------------  OUT:    Voided (mL): 1450 mL  Total OUT: 1450 mL    Net:   05-27-21 @ 07:01  -  05-28-21 @ 07:00  --------------------------------------------------------  NET: -932 mL    Physical Examination:  General Appearance: NAD, alert and cooperative  Heart: S1 and S2. No murmurs. Rhythm is regular.  Lungs: Clear to auscultation BL without rales, rhonchi, wheezing, crackles or diminished breath sounds.  Abdomen: Soft, nondistended, nontender. No rigidity, guarding, or rebound tenderness.     Medications: [Standing]  acetaminophen   Tablet .. 650 milliGRAM(s) Oral every 6 hours  chlorhexidine 4% Liquid 1 Application(s) Topical <User Schedule>  dronabinol 2.5 milliGRAM(s) Oral two times a day  heparin   Injectable 5000 Unit(s) SubCutaneous every 8 hours  lisinopril 20 milliGRAM(s) Oral daily  pantoprazole    Tablet 40 milliGRAM(s) Oral before breakfast  piperacillin/tazobactam IVPB.. 3.375 Gram(s) IV Intermittent every 8 hours  polyethylene glycol 3350 17 Gram(s) Oral daily  senna 2 Tablet(s) Oral at bedtime    Medications:[PRN]  oxyCODONE    IR 5 milliGRAM(s) Oral every 6 hours PRN  traZODone 50 milliGRAM(s) Oral at bedtime PRN    Labs:                        7.9    11.04 )-----------( 660      ( 27 May 2021 20:22 )             24.8   05-27    136  |  102  |  15  ----------------------------<  79  4.4   |  25  |  0.7    Ca    7.9<L>      27 May 2021 20:22  Phos  3.1     05-27  Mg     1.7     05-27    PT/INR - ( 26 May 2021 21:21 )   PT: 12.80 sec;   INR: 1.11 ratio      PTT - ( 26 May 2021 21:21 )  PTT:25.7 sec    Micro/Urine:    Imaging:  None/24h

## 2021-05-28 NOTE — PROGRESS NOTE ADULT - SUBJECTIVE AND OBJECTIVE BOX
CASSIDY NOE  75y, Female  Allergy: No Known Allergies      LOS  10d    CHIEF COMPLAINT: right breast abscess (27 May 2021 14:31)      INTERVAL EVENTS/HPI  - No acute events overnight s/p OR  - WCX  Actinomyces turicensis "Susceptibilities not performed"  - T(F): , Max: 98.5 (05-27-21 @ 13:00)  - Tolerating medication  - WBC Count: 11.04 (05-27-21 @ 20:22)  WBC Count: 10.65 (05-26-21 @ 21:21)     - Creatinine, Serum: 0.7 (05-27-21 @ 20:22)  Creatinine, Serum: 0.9 (05-26-21 @ 21:21)       ROS  ***    VITALS:  T(F): 96.6, Max: 98.5 (05-27-21 @ 13:00)  HR: 74  BP: 129/60  RR: 18Vital Signs Last 24 Hrs  T(C): 35.9 (28 May 2021 04:40), Max: 36.9 (27 May 2021 13:00)  T(F): 96.6 (28 May 2021 04:40), Max: 98.5 (27 May 2021 13:00)  HR: 74 (28 May 2021 04:40) (74 - 84)  BP: 129/60 (28 May 2021 04:40) (116/58 - 158/66)  BP(mean): --  RR: 18 (28 May 2021 04:40) (18 - 19)  SpO2: 95% (28 May 2021 05:35) (95% - 98%)    PHYSICAL EXAM:  ***    FH: Non-contributory  Social Hx: Non-contributory    TESTS & MEASUREMENTS:                        7.9    11.04 )-----------( 660      ( 27 May 2021 20:22 )             24.8     05-27    136  |  102  |  15  ----------------------------<  79  4.4   |  25  |  0.7    Ca    7.9<L>      27 May 2021 20:22  Phos  3.1     05-27  Mg     1.7     05-27      eGFR if Non African American: 84 mL/min/1.73M2 (05-27-21 @ 20:22)  eGFR if : 97 mL/min/1.73M2 (05-27-21 @ 20:22)          Culture - Tissue with Gram Stain (collected 05-24-21 @ 23:00)  Source: .Tissue None  Gram Stain (05-25-21 @ 11:48):    Rare polymorphonuclear leukocytes per low power field    No organisms seen per oil power field  Preliminary Report (05-26-21 @ 12:36):    No growth to date.    Culture - Blood (collected 05-24-21 @ 21:30)  Source: .Blood None  Preliminary Report (05-26-21 @ 07:01):    No growth to date.    Culture - Tissue with Gram Stain (collected 05-22-21 @ 14:00)  Source: .Tissue None  Gram Stain (05-23-21 @ 02:35):    Few polymorphonuclear leukocytes seen per low power field    Few Gram positive cocci in pairs seen per oil power field    Rare Gram Variable Rods seen per oil power field  Preliminary Report (05-27-21 @ 21:33):    Few Actinomyces turicensis "Susceptibilities not performed"    Culture - Tissue with Gram Stain (collected 05-20-21 @ 10:44)  Source: .Tissue None  Gram Stain (05-20-21 @ 21:54):    Rare polymorphonuclear leukocytes seen per low power field    Rare Gram Variable Rods seen per oil power field    Rare Gram positive cocci in pairs seen per oil power field  Final Report (05-23-21 @ 15:38):    Growth in fluid media only Coag Negative Staphylococcus    Moderate Anaerobic Gram Negative Rocky Most closely resembling    Prevotella species "Susceptibilities not performed"  Organism: Coag Negative Staphylococcus (05-23-21 @ 15:38)  Organism: Coag Negative Staphylococcus (05-23-21 @ 15:38)      -  Ampicillin/Sulbactam: R <=8/4      -  Cefazolin: R <=4      -  Clindamycin: R >4      -  Erythromycin: R >4      -  Gentamicin: S <=1 Should not be used as monotherapy      -  Oxacillin: R >2      -  Penicillin: R >8      -  RIF- Rifampin: S <=1 Should not be used as monotherapy      -  Tetra/Doxy: S <=1      -  Trimethoprim/Sulfamethoxazole: S <=0.5/9.5      -  Vancomycin: S 2      Method Type: RA    Culture - Urine (collected 05-18-21 @ 13:17)  Source: .Urine Clean Catch (Midstream)  Final Report (05-22-21 @ 09:44):    10,000 - 49,000 CFU/mL Escherichia coli    <10,000 CFU/ml Normal Urogenital johanna present  Organism: Escherichia coli (05-22-21 @ 09:44)  Organism: Escherichia coli (05-22-21 @ 09:44)      -  Amikacin: S <=16      -  Amoxicillin/Clavulanic Acid: S <=8/4      -  Ampicillin: R >16 These ampicillin results predict results for amoxicillin      -  Ampicillin/Sulbactam: I 16/8 Enterobacter, Citrobacter, and Serratia may develop resistance during prolonged therapy (3-4 days)      -  Aztreonam: S <=4      -  Cefazolin: S <=2 (MIC_CL_COM_ENTERIC_CEFAZU) For uncomplicated UTI with K. pneumoniae, E. coli, or P. mirablis: RA <=16 is sensitive and RA >=32 is resistant. This also predicts results for oral agents cefaclor, cefdinir, cefpodoxime, cefprozil, cefuroxime axetil, cephalexin and locarbef for uncomplicated UTI. Note that some isolates may be susceptible to these agents while testing resistant to cefazolin.      -  Cefepime: S <=2      -  Cefoxitin: S <=8      -  Ceftriaxone: S <=1 Enterobacter, Citrobacter, and Serratia may develop resistance during prolonged therapy      -  Ciprofloxacin: S <=0.25      -  Ertapenem: S <=0.5      -  Gentamicin: S <=2      -  Imipenem: S <=1      -  Levofloxacin: S <=0.5      -  Meropenem: S <=1      -  Nitrofurantoin: S <=32 Should not be used to treat pyelonephritis      -  Piperacillin/Tazobactam: S <=8      -  Tigecycline: S <=2      -  Tobramycin: S <=2      -  Trimethoprim/Sulfamethoxazole: R >2/38      Method Type: RA    Culture - Blood (collected 05-18-21 @ 11:00)  Source: .Blood Blood-Peripheral  Gram Stain (05-20-21 @ 21:44):    Growth in anaerobic bottle: Gram Positive Cocci in Clusters  Final Report (05-21-21 @ 18:02):    Growth in anaerobic bottle: Staphylococcus cohnii    Coag Negative Staphylococcus    Single set isolate, possible contaminant. Contact    Microbiology if susceptibility testing clinically    indicated.    ***Blood Panel PCR results on this specimen are available    approximately 3 hours after the Gram stain result.***    Gram stain, PCR, and/or culture results may not always    correspond due to difference in methodologies.    ************************************************************    This PCR assay was performed by multiplex PCR. This    Assay tests for 66 bacterial and resistance gene targets.    Please refer to the [facility] test directory    at [email or web address] for details.  Organism: Blood Culture PCR (05-21-21 @ 18:02)  Organism: Blood Culture PCR (05-21-21 @ 18:02)      -  Coagulase negative Staphylococcus: Detec      Method Type: PCR    Culture - Blood (collected 05-18-21 @ 11:00)  Source: .Blood Blood-Peripheral  Final Report (05-23-21 @ 23:00):    No Growth Final            INFECTIOUS DISEASES TESTING  COVID-19 PCR: NotDetec (05-26-21 @ 15:26)  COVID-19 PCR: NotDetec (05-23-21 @ 21:33)  COVID-19 PCR: NotDetec (05-21-21 @ 23:12)  MRSA PCR Result.: Negative (05-19-21 @ 10:40)  COVID-19 PCR: NotDetec (05-18-21 @ 10:14)      INFLAMMATORY MARKERS  Sedimentation Rate, Erythrocyte: 85 mm/Hr (05-21-21 @ 20:40)  C-Reactive Protein, Serum: 80 mg/L (05-21-21 @ 20:40)      RADIOLOGY & ADDITIONAL TESTS:  I have personally reviewed the last available Chest xray  CXR      CT  CT Chest w/ IV Cont:   EXAM:  CT CHEST IC            PROCEDURE DATE:  05/25/2021            INTERPRETATION:  Reason for study: Breast infection    Technique: On the day of this examination, an IV cannula was placed in the radiology department.  Approximately 100 cc of Optiray 320 was administered intravenously and multiple transaxial images of the thorax was obtained.  0 cc of contrast material was discarded.    Coronal and sagittal reformatted images were performed to better evaluate anatomic relationships and for possible preoperative planning.        Comparison: CT thorax none.    Findings:  Tubes/lines:none    Central airways/ Lung parenchyma/ pleura :  Patent central tracheobronchial tree. bilateral small pleural effusions with adjacent compressive atelectasis, both lower lobes. platelike atelectasis, lingular segment left upper lobe (4/126). no parenchymal mass, bronchiectasis or honeycombing..    Pulmonary Nodules:  5 mm solid subpleural nodule posterior right apex (4/46).    Chest wall/axilla: Extensive skin thickening with soft tissue defect, right breast. Scattered air bubbles within the breast parenchyma is noted consistent with clinical diagnosis of breast infection. No drainable breast collections are identified.    No axillary adenopathy.    Mediastinum/Great vesselsno adenopathy or mass. Great vessels are normal in caliber. Coronary artery and aortic calcifications are present. No pulmonary emboli.    Upper abdomen : Liver herniation through anterior right diaphragm is noted (603/58, 4/170). Right fat-containing Bochdalek hernia (4/196).    Osseous structures:Degenerative changes thoracic spine. No destructive osseous lesions.      Impression:    Right breast changes consistent with clinical diagnosis of breast infection. No drainable collections are identified.    Bilateral pleural effusions with adjacent compressive atelectasis, both lower lobes.. Patent central tracheobronchial tree.  platelike atelectasis, lingular segment left upper lobe (4/126).      5 mm solid subpleural nodule, posterior right apex (4/46). Consider follow-up CT scan 12 months time to assess stability.      Right-sided diaphragmatic hernia, described above...      5 mm solid subpleural nodule posterior right apex (4/46).                    KAROLINA ESPITIA MD; Attending Radiologist  This document has been electronically signed. May 26 2021  8:43AM (05-25-21 @ 17:39)      CARDIOLOGY TESTING  12 Lead ECG:   Ventricular Rate 89 BPM    Atrial Rate 89 BPM    P-R Interval 150 ms    QRS Duration 90 ms    Q-T Interval 366 ms    QTC Calculation(Bazett) 445 ms    P Axis 75 degrees    R Axis 78 degrees    T Axis 70 degrees    Diagnosis Line Normal sinus rhythm  Normal ECG    Confirmed by KENDRA HOLT MD ([id number]) on 5/18/2021 3:22:53 PM (05-18-21 @ 14:27)  12 Lead ECG:   Ventricular Rate 92 BPM    Atrial Rate 92 BPM    P-R Interval 142 ms    QRS Duration 78 ms    Q-T Interval 358 ms    QTC Calculation(Bazett) 442 ms    P Axis 75 degrees    R Axis 65 degrees    T Axis 67 degrees    Diagnosis Line Sinus rhythm with Premature atrial complexes  ST elevation, consider early repolarization  Borderline ECG    Confirmed by KENDRA HOLT MD ([id number]) on 5/18/2021 12:58:02 PM (05-18-21 @ 11:16)      MEDICATIONS  acetaminophen   Tablet .. 650 Oral every 6 hours  chlorhexidine 4% Liquid 1 Topical <User Schedule>  dronabinol 2.5 Oral two times a day  heparin   Injectable 5000 SubCutaneous every 8 hours  lisinopril 20 Oral daily  pantoprazole    Tablet 40 Oral before breakfast  piperacillin/tazobactam IVPB.. 3.375 IV Intermittent every 8 hours  polyethylene glycol 3350 17 Oral daily  senna 2 Oral at bedtime      WEIGHT  Weight (kg): 65.1 (05-27-21 @ 19:37)  Creatinine, Serum: 0.7 mg/dL (05-27-21 @ 20:22)      ANTIBIOTICS:  piperacillin/tazobactam IVPB.. 3.375 Gram(s) IV Intermittent every 8 hours      All available historical records have been reviewed       CASSIDY NOE  75y, Female  Allergy: No Known Allergies      LOS  10d    CHIEF COMPLAINT: right breast abscess (27 May 2021 14:31)      INTERVAL EVENTS/HPI  - No acute events overnight s/p OR  - WCX  Actinomyces turicensis "Susceptibilities not performed"  - T(F): , Max: 98.5 (05-27-21 @ 13:00)  - Tolerating medication  - WBC Count: 11.04 (05-27-21 @ 20:22)  WBC Count: 10.65 (05-26-21 @ 21:21)     - Creatinine, Serum: 0.7 (05-27-21 @ 20:22)  Creatinine, Serum: 0.9 (05-26-21 @ 21:21)       ROS  General: Denies rigors, nightsweats  HEENT: Denies headache, rhinorrhea, sore throat, eye pain  CV: Denies CP, palpitations  PULM: Denies wheezing, hemoptysis  GI: Denies hematemesis, hematochezia, melena  : Denies discharge, hematuria  MSK: Denies arthralgias, myalgias  SKIN: Denies rash, lesions  NEURO: Denies paresthesias, weakness  PSYCH: Denies depression, anxiety     VITALS:  T(F): 96.6, Max: 98.5 (05-27-21 @ 13:00)  HR: 74  BP: 129/60  RR: 18Vital Signs Last 24 Hrs  T(C): 35.9 (28 May 2021 04:40), Max: 36.9 (27 May 2021 13:00)  T(F): 96.6 (28 May 2021 04:40), Max: 98.5 (27 May 2021 13:00)  HR: 74 (28 May 2021 04:40) (74 - 84)  BP: 129/60 (28 May 2021 04:40) (116/58 - 158/66)  BP(mean): --  RR: 18 (28 May 2021 04:40) (18 - 19)  SpO2: 95% (28 May 2021 05:35) (95% - 98%)    PHYSICAL EXAM:  Gen: NAD, resting in bed  HEENT: Normocephalic, atraumatic  Neck: supple, no lymphadenopathy  CV: Regular rate & regular rhythm  Lungs: decreased BS at bases, no fremitus  Abdomen: Soft, BS present  Ext: Warm, well perfused  R breast vac  Neuro: non focal, awake  Skin: no rash, no erythema  Lines: no phlebitis     FH: Non-contributory  Social Hx: Non-contributory    TESTS & MEASUREMENTS:                        7.9    11.04 )-----------( 660      ( 27 May 2021 20:22 )             24.8     05-27    136  |  102  |  15  ----------------------------<  79  4.4   |  25  |  0.7    Ca    7.9<L>      27 May 2021 20:22  Phos  3.1     05-27  Mg     1.7     05-27      eGFR if Non African American: 84 mL/min/1.73M2 (05-27-21 @ 20:22)  eGFR if : 97 mL/min/1.73M2 (05-27-21 @ 20:22)          Culture - Tissue with Gram Stain (collected 05-24-21 @ 23:00)  Source: .Tissue None  Gram Stain (05-25-21 @ 11:48):    Rare polymorphonuclear leukocytes per low power field    No organisms seen per oil power field  Preliminary Report (05-26-21 @ 12:36):    No growth to date.    Culture - Blood (collected 05-24-21 @ 21:30)  Source: .Blood None  Preliminary Report (05-26-21 @ 07:01):    No growth to date.    Culture - Tissue with Gram Stain (collected 05-22-21 @ 14:00)  Source: .Tissue None  Gram Stain (05-23-21 @ 02:35):    Few polymorphonuclear leukocytes seen per low power field    Few Gram positive cocci in pairs seen per oil power field    Rare Gram Variable Rods seen per oil power field  Preliminary Report (05-27-21 @ 21:33):    Few Actinomyces turicensis "Susceptibilities not performed"    Culture - Tissue with Gram Stain (collected 05-20-21 @ 10:44)  Source: .Tissue None  Gram Stain (05-20-21 @ 21:54):    Rare polymorphonuclear leukocytes seen per low power field    Rare Gram Variable Rods seen per oil power field    Rare Gram positive cocci in pairs seen per oil power field  Final Report (05-23-21 @ 15:38):    Growth in fluid media only Coag Negative Staphylococcus    Moderate Anaerobic Gram Negative Rocky Most closely resembling    Prevotella species "Susceptibilities not performed"  Organism: Coag Negative Staphylococcus (05-23-21 @ 15:38)  Organism: Coag Negative Staphylococcus (05-23-21 @ 15:38)      -  Ampicillin/Sulbactam: R <=8/4      -  Cefazolin: R <=4      -  Clindamycin: R >4      -  Erythromycin: R >4      -  Gentamicin: S <=1 Should not be used as monotherapy      -  Oxacillin: R >2      -  Penicillin: R >8      -  RIF- Rifampin: S <=1 Should not be used as monotherapy      -  Tetra/Doxy: S <=1      -  Trimethoprim/Sulfamethoxazole: S <=0.5/9.5      -  Vancomycin: S 2      Method Type: RA    Culture - Urine (collected 05-18-21 @ 13:17)  Source: .Urine Clean Catch (Midstream)  Final Report (05-22-21 @ 09:44):    10,000 - 49,000 CFU/mL Escherichia coli    <10,000 CFU/ml Normal Urogenital johanna present  Organism: Escherichia coli (05-22-21 @ 09:44)  Organism: Escherichia coli (05-22-21 @ 09:44)      -  Amikacin: S <=16      -  Amoxicillin/Clavulanic Acid: S <=8/4      -  Ampicillin: R >16 These ampicillin results predict results for amoxicillin      -  Ampicillin/Sulbactam: I 16/8 Enterobacter, Citrobacter, and Serratia may develop resistance during prolonged therapy (3-4 days)      -  Aztreonam: S <=4      -  Cefazolin: S <=2 (MIC_CL_COM_ENTERIC_CEFAZU) For uncomplicated UTI with K. pneumoniae, E. coli, or P. mirablis: RA <=16 is sensitive and RA >=32 is resistant. This also predicts results for oral agents cefaclor, cefdinir, cefpodoxime, cefprozil, cefuroxime axetil, cephalexin and locarbef for uncomplicated UTI. Note that some isolates may be susceptible to these agents while testing resistant to cefazolin.      -  Cefepime: S <=2      -  Cefoxitin: S <=8      -  Ceftriaxone: S <=1 Enterobacter, Citrobacter, and Serratia may develop resistance during prolonged therapy      -  Ciprofloxacin: S <=0.25      -  Ertapenem: S <=0.5      -  Gentamicin: S <=2      -  Imipenem: S <=1      -  Levofloxacin: S <=0.5      -  Meropenem: S <=1      -  Nitrofurantoin: S <=32 Should not be used to treat pyelonephritis      -  Piperacillin/Tazobactam: S <=8      -  Tigecycline: S <=2      -  Tobramycin: S <=2      -  Trimethoprim/Sulfamethoxazole: R >2/38      Method Type: RA    Culture - Blood (collected 05-18-21 @ 11:00)  Source: .Blood Blood-Peripheral  Gram Stain (05-20-21 @ 21:44):    Growth in anaerobic bottle: Gram Positive Cocci in Clusters  Final Report (05-21-21 @ 18:02):    Growth in anaerobic bottle: Staphylococcus cohnii    Coag Negative Staphylococcus    Single set isolate, possible contaminant. Contact    Microbiology if susceptibility testing clinically    indicated.    ***Blood Panel PCR results on this specimen are available    approximately 3 hours after the Gram stain result.***    Gram stain, PCR, and/or culture results may not always    correspond due to difference in methodologies.    ************************************************************    This PCR assay was performed by multiplex PCR. This    Assay tests for 66 bacterial and resistance gene targets.    Please refer to the [facility] test directory    at [email or web address] for details.  Organism: Blood Culture PCR (05-21-21 @ 18:02)  Organism: Blood Culture PCR (05-21-21 @ 18:02)      -  Coagulase negative Staphylococcus: Detec      Method Type: PCR    Culture - Blood (collected 05-18-21 @ 11:00)  Source: .Blood Blood-Peripheral  Final Report (05-23-21 @ 23:00):    No Growth Final            INFECTIOUS DISEASES TESTING  COVID-19 PCR: NotDetec (05-26-21 @ 15:26)  COVID-19 PCR: NotDetec (05-23-21 @ 21:33)  COVID-19 PCR: NotDetec (05-21-21 @ 23:12)  MRSA PCR Result.: Negative (05-19-21 @ 10:40)  COVID-19 PCR: NotDetec (05-18-21 @ 10:14)      INFLAMMATORY MARKERS  Sedimentation Rate, Erythrocyte: 85 mm/Hr (05-21-21 @ 20:40)  C-Reactive Protein, Serum: 80 mg/L (05-21-21 @ 20:40)      RADIOLOGY & ADDITIONAL TESTS:  I have personally reviewed the last available Chest xray  CXR      CT  CT Chest w/ IV Cont:   EXAM:  CT CHEST IC            PROCEDURE DATE:  05/25/2021            INTERPRETATION:  Reason for study: Breast infection    Technique: On the day of this examination, an IV cannula was placed in the radiology department.  Approximately 100 cc of Optiray 320 was administered intravenously and multiple transaxial images of the thorax was obtained.  0 cc of contrast material was discarded.    Coronal and sagittal reformatted images were performed to better evaluate anatomic relationships and for possible preoperative planning.        Comparison: CT thorax none.    Findings:  Tubes/lines:none    Central airways/ Lung parenchyma/ pleura :  Patent central tracheobronchial tree. bilateral small pleural effusions with adjacent compressive atelectasis, both lower lobes. platelike atelectasis, lingular segment left upper lobe (4/126). no parenchymal mass, bronchiectasis or honeycombing..    Pulmonary Nodules:  5 mm solid subpleural nodule posterior right apex (4/46).    Chest wall/axilla: Extensive skin thickening with soft tissue defect, right breast. Scattered air bubbles within the breast parenchyma is noted consistent with clinical diagnosis of breast infection. No drainable breast collections are identified.    No axillary adenopathy.    Mediastinum/Great vesselsno adenopathy or mass. Great vessels are normal in caliber. Coronary artery and aortic calcifications are present. No pulmonary emboli.    Upper abdomen : Liver herniation through anterior right diaphragm is noted (603/58, 4/170). Right fat-containing Bochdalek hernia (4/196).    Osseous structures:Degenerative changes thoracic spine. No destructive osseous lesions.      Impression:    Right breast changes consistent with clinical diagnosis of breast infection. No drainable collections are identified.    Bilateral pleural effusions with adjacent compressive atelectasis, both lower lobes.. Patent central tracheobronchial tree.  platelike atelectasis, lingular segment left upper lobe (4/126).      5 mm solid subpleural nodule, posterior right apex (4/46). Consider follow-up CT scan 12 months time to assess stability.      Right-sided diaphragmatic hernia, described above...      5 mm solid subpleural nodule posterior right apex (4/46).                    KAROLINA ESPITIA MD; Attending Radiologist  This document has been electronically signed. May 26 2021  8:43AM (05-25-21 @ 17:39)      CARDIOLOGY TESTING  12 Lead ECG:   Ventricular Rate 89 BPM    Atrial Rate 89 BPM    P-R Interval 150 ms    QRS Duration 90 ms    Q-T Interval 366 ms    QTC Calculation(Bazett) 445 ms    P Axis 75 degrees    R Axis 78 degrees    T Axis 70 degrees    Diagnosis Line Normal sinus rhythm  Normal ECG    Confirmed by KENDRA HOLT MD ([id number]) on 5/18/2021 3:22:53 PM (05-18-21 @ 14:27)  12 Lead ECG:   Ventricular Rate 92 BPM    Atrial Rate 92 BPM    P-R Interval 142 ms    QRS Duration 78 ms    Q-T Interval 358 ms    QTC Calculation(Bazett) 442 ms    P Axis 75 degrees    R Axis 65 degrees    T Axis 67 degrees    Diagnosis Line Sinus rhythm with Premature atrial complexes  ST elevation, consider early repolarization  Borderline ECG    Confirmed by KENDRA HOLT MD ([id number]) on 5/18/2021 12:58:02 PM (05-18-21 @ 11:16)      MEDICATIONS  acetaminophen   Tablet .. 650 Oral every 6 hours  chlorhexidine 4% Liquid 1 Topical <User Schedule>  dronabinol 2.5 Oral two times a day  heparin   Injectable 5000 SubCutaneous every 8 hours  lisinopril 20 Oral daily  pantoprazole    Tablet 40 Oral before breakfast  piperacillin/tazobactam IVPB.. 3.375 IV Intermittent every 8 hours  polyethylene glycol 3350 17 Oral daily  senna 2 Oral at bedtime      WEIGHT  Weight (kg): 65.1 (05-27-21 @ 19:37)  Creatinine, Serum: 0.7 mg/dL (05-27-21 @ 20:22)      ANTIBIOTICS:  piperacillin/tazobactam IVPB.. 3.375 Gram(s) IV Intermittent every 8 hours      All available historical records have been reviewed

## 2021-05-28 NOTE — CHART NOTE - NSCHARTNOTEFT_GEN_A_CORE
Registered Dietitian Follow-Up     Patient Profile Reviewed                           Yes [x]   No []     Nutrition History Previously Obtained        Yes [x]  No []     Pertinent Subjective Information: Pt on the phone at time of RD visit, therefore only minimal conversation; states that her appetite is slightly better and that she is a eating a little bit more than she was. States that since last f/u, she drank 2 bottles of Ensure Enlive. Unsure why oral supplements D/C'd yesterday; likely done in error when diet advanced from NPO.      Pertinent Medical Interventions: Pt went to OR on 5/20, 5/22 and 5/24 for debridements and a wound vac was placed on 5/24. Plastic surgery consulted for eventual closure of wound/previous abscess cavity; continue with wound vac therapy will re-evaluate at dressing change tomorrow 5/26 per Vascular.      Diet order: Regular + Arthur TID + Ensure Enlive TID      Anthropometrics:  - Ht. 60"  - Wt. 144#/65.1kg (5/25)--will continue to monitor wt trends closely   (5/23): 155#/70.5kg   (5/18): 140#/63.3kg   - %wt change  - BMI: 28.0 using new dosing wt  - #      Pertinent Lab Data: (5/24): H/H 8.1/25.6, POCT 142, Gluc 187, Mg 1.4      Pertinent Meds: heparin, abx, marinol, lisinopril, protonix, miralax, senna      Physical Findings:  - Appearance: alert and oriented; no edema noted   - GI function: LBM 5/24  - Tubes: N/A   - Oral/Mouth cavity: none reported   - Skin: surgical incision      Nutrition Requirements  Weight Used: lowest wt this adm: 140#/63.3kg; needs readjusted 2/2 severe PCM      Estimated Energy Needs: 9621-3323 kcal/day (MSJ x 1.2-1.5 AF)  Estimated Protein Needs: 76-95 gm/day (1.2-1.5 gm/kg CBW)  Estimated Fluid Needs: 1 ml/kcal      Nutrient Intake: not meeting kcal/pro needs at this time      Previous Nutrition Diagnosis: Inadequate Oral Intake (ongoing)     Nutrition Diagnostic #1  Problem: Severe protein-calorie malnutrition in the context of acute illness   Etiology: unknown etiology   Statement: as evidenced by wt loss of 28#, ~17% of body wt over 3 months and <50% of energy needs met for >5 days      Nutrition Intervention: meals and snacks, medical food supplements, nutrition-related medication management     Recommendations:  1. Continue current diet order and oral supplementation      Goal/Expected Outcome: Pt to consume >50% of meals, snacks, and supplements within 3 days      Indicator/Monitoring: RD to monitor energy intake, body composition, glucose/electrolyte profiles, NFPF. Registered Dietitian Follow-Up     Patient Profile Reviewed                           Yes [x]   No []     Nutrition History Previously Obtained        Yes [x]  No []     Pertinent Subjective Information: Pt on the phone at time of RD visit, therefore minimal conversation; states that her appetite is slightly better and that she is a eating a little bit more than she was. States that since last f/u, she drank 2 bottles of Ensure Enlive. Unsure why oral supplements D/C'd yesterday; likely done in error when diet advanced from NPO. Will communicate with LIP to reorder.      Pertinent Medical Interventions: POD#1 s/p redebridement of soft tissue infection of right breast, overall patient doing well per progress notes. Set up VNS/home care with wound vac; f/u with ID re antibiotic choice given + AFB cultures.     Diet order: Regular, DASH/TLC      Anthropometrics:  - Ht. 60"  - Wt. 139#/63.1kg (5/28)--will continue to monitor wt trends closely   (5/25): 144#/65.1kg   (5/23): 155#/70.5kg   (5/18): 140#/63.3kg   - %wt change  - BMI: 28.0 using new dosing wt  - #      Pertinent Lab Data: (5/27): H/H 7.9/24.8, POCT 116, Mg 1.7      Pertinent Meds: heparin, abx, marinol, lisinopril, protonix, miralax, senna      Physical Findings:  - Appearance: alert and oriented; no edema noted   - GI function: LBM 5/24  - Tubes: N/A   - Oral/Mouth cavity: none reported   - Skin: surgical incision      Nutrition Requirements  Weight Used: lowest wt this adm: 140#/63.3kg; needs readjusted 2/2 severe PCM      Estimated Energy Needs: 3314-2441 kcal/day (MSJ x 1.2-1.5 AF)  Estimated Protein Needs: 76-95 gm/day (1.2-1.5 gm/kg CBW)  Estimated Fluid Needs: 1 ml/kcal      Nutrient Intake: not meeting kcal/pro needs at this time      Previous Nutrition Diagnosis: 1) Inadequate Oral Intake, 2) Severe PCM (ongoing)     Nutrition Intervention: meals and snacks, medical food supplements, nutrition-related medication management     Recommendations:  1. Order Ensure Enlive TID--all recs discussed with LIP ([id number])     Goal/Expected Outcome: Pt to consume >50% of meals, snacks, and supplements within 4 days      Indicator/Monitoring: RD to monitor energy intake, body composition, glucose/electrolyte profiles, NFPF.

## 2021-05-28 NOTE — PROGRESS NOTE ADULT - ASSESSMENT
ASSESSMENT  75F w/ PMH of HTN and b/l lumpectomies (10-15 years ago, reportedly benign pathology) presented to the ED with worsening R breast pain. Pt reports R breast biopsy last week Tuesday, 5/11. She was told to keep the dressing on for 5 days. Since the biopsy, she has been experiencing R breast pain. 2 days ago, she removed the dressing and saw erythema and small amount of necrotic tissue overlying R underside of breast. This morning the abscess ruptured and began putting out pus and necrotic tissue.    IMPRESSION  #Resolved, Severe Sepsis on admission lactic acidosis due to necrotic/necrotizing fasciitis R breast abscess after biopsy, Pathology with + AFB and filamentous forms +  Actinomyces turicensis "Susceptibilities not performed"    5/24 WCX NG    5/24 BCX NGTD     5/22 WCX pending   s/p debridement 5/22 -- necrotizing soft tissue infection    s/p OR Exploration, wound, chest 20-May-2021 08:50:47 right breast Michelle Wagner  Incision and drainage, breast, with debridement 20-May-2021 08:51:08  Michelel Wagner  Irrigation and excisional debridement of musculofascial tissue 22-May-2021 09:04:56 right breast "AFB cultures sent, wound debrided to healthy tissue"    s/p OR WCX 5/20 --   Growth in fluid media only Coag Negative Staphylococcus- contaminant     Moderate Anaerobic Gram Negative Rocky Most closely resembling    Prevotella species "Susceptibilities not performed"  s/p Exploration, wound, chest 20-May-2021 08:50:47 right Michelle Wagner  Incision and drainage, breast, with debridement 20-May-2021 "extensive necrotizing soft tissue infection occupying more than 2/3rds of her breast, debrided to healthy tissue, packed with kerlex"    UCX   10,000 - 49,000 CFU/mL Escherichia coli (I unasyn, R bactrim)    s/p I&D exudate, purulent drainage, necrotic tissue    MRSA PCR Result.: Negative (05-19-21 @ 10:40)  #CoNS in bcx is a contaminant 1/4 bottles    5/18 1/4 bottles coNS  #Hyponatremia  Creatinine, Serum: 0.8 (05-19-21 @ 05:34)    Weight (kg): 63.3 (05-18-21 @ 16:27)    RECOMMENDATIONS  - f/u AFB stain/culture and MODIFIED acid fast stain and culture for NOCARDIA stain  - Actinomyces turicensis should NOT stain acid fast  - For actinomyces- Therapy often is needed for 6 months to a year- oral amoxicillin 500 mg three times daily on D/C  - Will need to f/u ID of AFB + organism and it is difficult to create an empiric regimen without known the species   - f/u final WCX 5/20, 5/22  - continue zosyn 3.375 Gram(s) IV Intermittent every 8 hours  - Surgical Pathology Report:  Benign ulcerated/denuded skin and subcutaneous adipose tissue with coagulative necrosis, marked congestion, and acute  inflammation with abscess formation.- A mixed bacterial and mycobacterial colonization is present consisting of Gram (+) cocci in clusters, both Gram (+) and Gram  (-) bacilli seen singly and in filamentous forms, and scattered AFB (+) bacillus.(05.20.21 @ 08:22)  - No OR AFB sent initially  - Will discuss trying to perform PCR testing on AFB and sending to [place] with Pathology if OR cx today NG    Please call with any questions or send a message on [employer] Teams  Spectra [phone]    ASSESSMENT  75F w/ PMH of HTN and b/l lumpectomies (10-15 years ago, reportedly benign pathology) presented to the ED with worsening R breast pain. Pt reports R breast biopsy last week Tuesday, 5/11. She was told to keep the dressing on for 5 days. Since the biopsy, she has been experiencing R breast pain. 2 days ago, she removed the dressing and saw erythema and small amount of necrotic tissue overlying R underside of breast. This morning the abscess ruptured and began putting out pus and necrotic tissue.    IMPRESSION  #Resolved, Severe Sepsis on admission lactic acidosis due to necrotic/necrotizing fasciitis R breast abscess after biopsy, Pathology with + AFB and filamentous forms +  Actinomyces turicensis "Susceptibilities not performed"    5/24 WCX NG    5/24 BCX NGTD     5/22 WCX pending   s/p debridement 5/22 -- necrotizing soft tissue infection    s/p OR Exploration, wound, chest 20-May-2021 08:50:47 right breast Mcihelle Wagner  Incision and drainage, breast, with debridement 20-May-2021 08:51:08  Michelle Wagner  Irrigation and excisional debridement of musculofascial tissue 22-May-2021 09:04:56 right breast "AFB cultures sent, wound debrided to healthy tissue"    s/p OR WCX 5/20 --   Growth in fluid media only Coag Negative Staphylococcus- contaminant     Moderate Anaerobic Gram Negative Rocky Most closely resembling    Prevotella species "Susceptibilities not performed"  s/p Exploration, wound, chest 20-May-2021 08:50:47 right Michelle Wagner  Incision and drainage, breast, with debridement 20-May-2021 "extensive necrotizing soft tissue infection occupying more than 2/3rds of her breast, debrided to healthy tissue, packed with kerlex"    UCX   10,000 - 49,000 CFU/mL Escherichia coli (I unasyn, R bactrim)    s/p I&D exudate, purulent drainage, necrotic tissue    MRSA PCR Result.: Negative (05-19-21 @ 10:40)  #CoNS in bcx is a contaminant 1/4 bottles    5/18 1/4 bottles coNS  #Hyponatremia  Creatinine, Serum: 0.8 (05-19-21 @ 05:34)    Weight (kg): 63.3 (05-18-21 @ 16:27)    RECOMMENDATIONS  - f/u AFB stain/culture and MODIFIED acid fast stain and culture for NOCARDIA stain  - Actinomyces turicensis should NOT stain acid fast  - For actinomyces- Therapy often is needed for 6 months to a year- oral amoxicillin 500 mg three times daily on D/C for unknown duration 6-12mo   - Will need to f/u ID of AFB + organism and it is difficult to create an empiric regimen without known the species - may need to be decided as an outpatient   - f/u final WCX 5/20, 5/22  - continue zosyn 3.375 Gram(s) IV Intermittent every 8 hours  - Surgical Pathology Report:  Benign ulcerated/denuded skin and subcutaneous adipose tissue with coagulative necrosis, marked congestion, and acute  inflammation with abscess formation.- A mixed bacterial and mycobacterial colonization is present consisting of Gram (+) cocci in clusters, both Gram (+) and Gram  (-) bacilli seen singly and in filamentous forms, and scattered AFB (+) bacillus.(05.20.21 @ 08:22)  - No OR AFB sent initially  - Will discuss trying to perform PCR testing on AFB and sending to [place] with Pathology if OR cx today NG  - ID follow-up with Dr. Keegan Evangelista for Telehealth. We will call the patient between 10:30-6:30      [id number]       [phone]       Fax [phone]     Please call with any questions or send a message on [employer] Teams  Spectra [phone]

## 2021-05-29 LAB
ANION GAP SERPL CALC-SCNC: 6 MMOL/L — LOW (ref 7–14)
BASOPHILS # BLD AUTO: 0.04 K/UL — SIGNIFICANT CHANGE UP (ref 0–0.2)
BASOPHILS NFR BLD AUTO: 0.4 % — SIGNIFICANT CHANGE UP (ref 0–1)
BUN SERPL-MCNC: 15 MG/DL — SIGNIFICANT CHANGE UP (ref 10–20)
CALCIUM SERPL-MCNC: 7.9 MG/DL — LOW (ref 8.5–10.1)
CHLORIDE SERPL-SCNC: 106 MMOL/L — SIGNIFICANT CHANGE UP (ref 98–110)
CO2 SERPL-SCNC: 27 MMOL/L — SIGNIFICANT CHANGE UP (ref 17–32)
CREAT SERPL-MCNC: 0.7 MG/DL — SIGNIFICANT CHANGE UP (ref 0.7–1.5)
EOSINOPHIL # BLD AUTO: 0.17 K/UL — SIGNIFICANT CHANGE UP (ref 0–0.7)
EOSINOPHIL NFR BLD AUTO: 1.8 % — SIGNIFICANT CHANGE UP (ref 0–8)
GLUCOSE SERPL-MCNC: 93 MG/DL — SIGNIFICANT CHANGE UP (ref 70–99)
HCT VFR BLD CALC: 24.2 % — LOW (ref 37–47)
HGB BLD-MCNC: 7.4 G/DL — LOW (ref 12–16)
IMM GRANULOCYTES NFR BLD AUTO: 0.4 % — HIGH (ref 0.1–0.3)
LYMPHOCYTES # BLD AUTO: 1.84 K/UL — SIGNIFICANT CHANGE UP (ref 1.2–3.4)
LYMPHOCYTES # BLD AUTO: 19.2 % — LOW (ref 20.5–51.1)
MAGNESIUM SERPL-MCNC: 1.7 MG/DL — LOW (ref 1.8–2.4)
MCHC RBC-ENTMCNC: 27.3 PG — SIGNIFICANT CHANGE UP (ref 27–31)
MCHC RBC-ENTMCNC: 30.6 G/DL — LOW (ref 32–37)
MCV RBC AUTO: 89.3 FL — SIGNIFICANT CHANGE UP (ref 81–99)
MONOCYTES # BLD AUTO: 0.97 K/UL — HIGH (ref 0.1–0.6)
MONOCYTES NFR BLD AUTO: 10.1 % — HIGH (ref 1.7–9.3)
NEUTROPHILS # BLD AUTO: 6.5 K/UL — SIGNIFICANT CHANGE UP (ref 1.4–6.5)
NEUTROPHILS NFR BLD AUTO: 68.1 % — SIGNIFICANT CHANGE UP (ref 42.2–75.2)
NRBC # BLD: 0 /100 WBCS — SIGNIFICANT CHANGE UP (ref 0–0)
PHOSPHATE SERPL-MCNC: 2.9 MG/DL — SIGNIFICANT CHANGE UP (ref 2.1–4.9)
PLATELET # BLD AUTO: 699 K/UL — HIGH (ref 130–400)
POTASSIUM SERPL-MCNC: 4.5 MMOL/L — SIGNIFICANT CHANGE UP (ref 3.5–5)
POTASSIUM SERPL-SCNC: 4.5 MMOL/L — SIGNIFICANT CHANGE UP (ref 3.5–5)
RBC # BLD: 2.71 M/UL — LOW (ref 4.2–5.4)
RBC # FLD: 16.2 % — HIGH (ref 11.5–14.5)
SODIUM SERPL-SCNC: 139 MMOL/L — SIGNIFICANT CHANGE UP (ref 135–146)
WBC # BLD: 9.56 K/UL — SIGNIFICANT CHANGE UP (ref 4.8–10.8)
WBC # FLD AUTO: 9.56 K/UL — SIGNIFICANT CHANGE UP (ref 4.8–10.8)

## 2021-05-29 RX ORDER — MAGNESIUM SULFATE 500 MG/ML
2 VIAL (ML) INJECTION ONCE
Refills: 0 | Status: COMPLETED | OUTPATIENT
Start: 2021-05-29 | End: 2021-05-29

## 2021-05-29 RX ADMIN — Medication 650 MILLIGRAM(S): at 06:12

## 2021-05-29 RX ADMIN — Medication 650 MILLIGRAM(S): at 17:38

## 2021-05-29 RX ADMIN — Medication 2.5 MILLIGRAM(S): at 06:11

## 2021-05-29 RX ADMIN — OXYCODONE HYDROCHLORIDE 5 MILLIGRAM(S): 5 TABLET ORAL at 02:53

## 2021-05-29 RX ADMIN — PANTOPRAZOLE SODIUM 40 MILLIGRAM(S): 20 TABLET, DELAYED RELEASE ORAL at 06:12

## 2021-05-29 RX ADMIN — HEPARIN SODIUM 5000 UNIT(S): 5000 INJECTION INTRAVENOUS; SUBCUTANEOUS at 21:07

## 2021-05-29 RX ADMIN — PIPERACILLIN AND TAZOBACTAM 25 GRAM(S): 4; .5 INJECTION, POWDER, LYOPHILIZED, FOR SOLUTION INTRAVENOUS at 06:12

## 2021-05-29 RX ADMIN — Medication 50 GRAM(S): at 06:12

## 2021-05-29 RX ADMIN — HEPARIN SODIUM 5000 UNIT(S): 5000 INJECTION INTRAVENOUS; SUBCUTANEOUS at 06:12

## 2021-05-29 RX ADMIN — Medication 650 MILLIGRAM(S): at 17:26

## 2021-05-29 RX ADMIN — CHLORHEXIDINE GLUCONATE 1 APPLICATION(S): 213 SOLUTION TOPICAL at 06:11

## 2021-05-29 RX ADMIN — OXYCODONE HYDROCHLORIDE 5 MILLIGRAM(S): 5 TABLET ORAL at 18:05

## 2021-05-29 RX ADMIN — Medication 2.5 MILLIGRAM(S): at 17:26

## 2021-05-29 RX ADMIN — Medication 50 MILLIGRAM(S): at 06:11

## 2021-05-29 RX ADMIN — Medication 650 MILLIGRAM(S): at 12:00

## 2021-05-29 RX ADMIN — PIPERACILLIN AND TAZOBACTAM 25 GRAM(S): 4; .5 INJECTION, POWDER, LYOPHILIZED, FOR SOLUTION INTRAVENOUS at 13:21

## 2021-05-29 RX ADMIN — LISINOPRIL 20 MILLIGRAM(S): 2.5 TABLET ORAL at 06:12

## 2021-05-29 RX ADMIN — HEPARIN SODIUM 5000 UNIT(S): 5000 INJECTION INTRAVENOUS; SUBCUTANEOUS at 13:21

## 2021-05-29 RX ADMIN — OXYCODONE HYDROCHLORIDE 5 MILLIGRAM(S): 5 TABLET ORAL at 17:36

## 2021-05-29 RX ADMIN — Medication 650 MILLIGRAM(S): at 00:08

## 2021-05-29 RX ADMIN — Medication 650 MILLIGRAM(S): at 11:23

## 2021-05-29 RX ADMIN — PIPERACILLIN AND TAZOBACTAM 25 GRAM(S): 4; .5 INJECTION, POWDER, LYOPHILIZED, FOR SOLUTION INTRAVENOUS at 21:06

## 2021-05-29 NOTE — PROGRESS NOTE ADULT - SUBJECTIVE AND OBJECTIVE BOX
GENERAL SURGERY PROGRESS NOTE    Patient: CASSIDY NOE , 75y ([birth year])Female   MRN: [id number]  Location: [address]  Visit: 05-18-21 Inpatient  Date: 05-29-21 @ 09:21    Events of past 24 hours: no acute events    PAST MEDICAL & SURGICAL HISTORY:  Hypertension    Rheumatoid arthritis        Vitals:   T(F): 96.5 (05-29-21 @ 05:05), Max: 97.7 (05-28-21 @ 10:42)  HR: 73 (05-29-21 @ 05:05)  BP: 164/74 (05-29-21 @ 05:05)  RR: 18 (05-29-21 @ 05:05)  SpO2: --      Diet, Regular:   DASH/TLC Sodium & Cholesterol Restricted  Supplement Feeding Modality:  Oral  Ensure Enlive Cans or Servings Per Day:  1       Frequency:  Three Times a day      Fluids:     I & O's:    05-28-21 @ 07:01  -  05-29-21 @ 07:00  --------------------------------------------------------  IN:  Total IN: 0 mL    OUT:    Voided (mL): 250 mL  Total OUT: 250 mL    Total NET: -250 mL        Bowel Movement: : [] YES [] NO  Flatus: : [] YES [] NO    PHYSICAL EXAM:  General: NAD, AAOx3, calm and cooperative  HEENT: NCAT, ELODIA, EOMI, Trachea ML, Neck supple  Cardiac: RRR S1, S2, no Murmurs, rubs or gallops  Cheat: wound vac in place  Respiratory: CTAB, normal respiratory effort, breath sounds equal BL, no wheeze, rhonchi or crackles  Abdomen: Soft, non-distended, non-tender, no rebound, no guarding. +BS.  Rectal: Good tone, +stool, no blood, no lee ann-anal masses/lesions, no fistulas, fissures, hemorrhoids  Musculoskeletal: Strength 5/5 BL UE/LE, ROM intact, compartments soft  Neuro: Sensation grossly intact and equal throughout, no focal deficits  Vascular: Pulses 2+ throughout, extremities well perfused  Skin: Warm/dry, normal color, no jaundice  Incision/wound: healing well, dressings in place, clean, dry and intact    MEDICATIONS  (STANDING):  acetaminophen   Tablet .. 650 milliGRAM(s) Oral every 6 hours  chlorhexidine 4% Liquid 1 Application(s) Topical <User Schedule>  dronabinol 2.5 milliGRAM(s) Oral two times a day  heparin   Injectable 5000 Unit(s) SubCutaneous every 8 hours  lisinopril 20 milliGRAM(s) Oral daily  pantoprazole    Tablet 40 milliGRAM(s) Oral before breakfast  piperacillin/tazobactam IVPB.. 3.375 Gram(s) IV Intermittent every 8 hours  polyethylene glycol 3350 17 Gram(s) Oral daily  senna 2 Tablet(s) Oral at bedtime    MEDICATIONS  (PRN):  oxyCODONE    IR 5 milliGRAM(s) Oral every 6 hours PRN Moderate Pain (4 - 6)  traZODone 50 milliGRAM(s) Oral at bedtime PRN insomnia/anxiety      DVT PROPHYLAXIS: heparin   Injectable 5000 Unit(s) SubCutaneous every 8 hours    GI PROPHYLAXIS: pantoprazole    Tablet 40 milliGRAM(s) Oral before breakfast    ANTICOAGULATION:   ANTIBIOTICS:  piperacillin/tazobactam IVPB.. 3.375 Gram(s)            LAB/STUDIES:  Labs:  CAPILLARY BLOOD GLUCOSE      POCT Blood Glucose.: 111 mg/dL (28 May 2021 16:27)  POCT Blood Glucose.: 116 mg/dL (28 May 2021 11:28)                          7.2    10.86 )-----------( 673      ( 28 May 2021 21:08 )             22.7       Auto Neutrophil %: 72.2 % (05-28-21 @ 21:08)  Auto Immature Granulocyte %: 0.6 % (05-28-21 @ 21:08)    05-28    136  |  103  |  17  ----------------------------<  129<H>  4.4   |  26  |  1.0      Calcium, Total Serum: 7.9 mg/dL (05-28-21 @ 21:08)      LFTs:         Coags:                Culture - Acid Fast - Other w/Smear (collected 27 May 2021 20:00)  Source: .Other RIGHT BREAST    Culture - Surgical Swab (collected 27 May 2021 20:00)  Source: .Surgical Swab RIGHT BREAST  Preliminary Report (29 May 2021 08:10):    No growth    Culture - Acid Fast - Other w/Smear (collected 27 May 2021 20:00)  Source: .Other RIGHT BREAST    Culture - Acid Fast - Tissue w/Smear (collected 27 May 2021 20:00)  Source: .Tissue RT BREAST TISSUE    Culture - Tissue with Gram Stain (collected 27 May 2021 20:00)  Source: .Tissue RIGHT BREAST TISSUE  Gram Stain (28 May 2021 13:43):    No polymorphonuclear leukocytes per low power field    No organisms seen per oil power field  Preliminary Report (29 May 2021 07:35):    No growth              IMAGING:

## 2021-05-29 NOTE — PROGRESS NOTE ADULT - ASSESSMENT
75y Female patient admitted POD 2 s/p redebridement of soft tissue infection of right breast, overall patient doing well, NPWT in place to -75 of suction , with the above physical exam, labs, and imaging findings.    Plan:  - set up VNS/home care with wound vac  - f/u with ID re antibiotic choice given + AFB cultures  -Pain control as needed  -Hemodynamic monitoring as per routine  -Encourage ambulation and incentive spirometer use (10x/hr when awake)  -GI and DVT prophylaxis  -Check and replete CBC and BMP q daily  -Strict input and output monitoring  -Continue current management

## 2021-05-30 LAB
ANION GAP SERPL CALC-SCNC: 8 MMOL/L — SIGNIFICANT CHANGE UP (ref 7–14)
BASOPHILS # BLD AUTO: 0.04 K/UL — SIGNIFICANT CHANGE UP (ref 0–0.2)
BASOPHILS NFR BLD AUTO: 0.5 % — SIGNIFICANT CHANGE UP (ref 0–1)
BUN SERPL-MCNC: 16 MG/DL — SIGNIFICANT CHANGE UP (ref 10–20)
CALCIUM SERPL-MCNC: 8 MG/DL — LOW (ref 8.5–10.1)
CHLORIDE SERPL-SCNC: 105 MMOL/L — SIGNIFICANT CHANGE UP (ref 98–110)
CO2 SERPL-SCNC: 25 MMOL/L — SIGNIFICANT CHANGE UP (ref 17–32)
CREAT SERPL-MCNC: 0.8 MG/DL — SIGNIFICANT CHANGE UP (ref 0.7–1.5)
CULTURE RESULTS: SIGNIFICANT CHANGE UP
EOSINOPHIL # BLD AUTO: 0.16 K/UL — SIGNIFICANT CHANGE UP (ref 0–0.7)
EOSINOPHIL NFR BLD AUTO: 1.9 % — SIGNIFICANT CHANGE UP (ref 0–8)
GLUCOSE SERPL-MCNC: 161 MG/DL — HIGH (ref 70–99)
HCT VFR BLD CALC: 24.8 % — LOW (ref 37–47)
HGB BLD-MCNC: 7.6 G/DL — LOW (ref 12–16)
IMM GRANULOCYTES NFR BLD AUTO: 0.2 % — SIGNIFICANT CHANGE UP (ref 0.1–0.3)
LYMPHOCYTES # BLD AUTO: 1.45 K/UL — SIGNIFICANT CHANGE UP (ref 1.2–3.4)
LYMPHOCYTES # BLD AUTO: 17.5 % — LOW (ref 20.5–51.1)
MAGNESIUM SERPL-MCNC: 1.7 MG/DL — LOW (ref 1.8–2.4)
MCHC RBC-ENTMCNC: 27.8 PG — SIGNIFICANT CHANGE UP (ref 27–31)
MCHC RBC-ENTMCNC: 30.6 G/DL — LOW (ref 32–37)
MCV RBC AUTO: 90.8 FL — SIGNIFICANT CHANGE UP (ref 81–99)
MONOCYTES # BLD AUTO: 0.97 K/UL — HIGH (ref 0.1–0.6)
MONOCYTES NFR BLD AUTO: 11.7 % — HIGH (ref 1.7–9.3)
NEUTROPHILS # BLD AUTO: 5.64 K/UL — SIGNIFICANT CHANGE UP (ref 1.4–6.5)
NEUTROPHILS NFR BLD AUTO: 68.2 % — SIGNIFICANT CHANGE UP (ref 42.2–75.2)
NRBC # BLD: 0 /100 WBCS — SIGNIFICANT CHANGE UP (ref 0–0)
PHOSPHATE SERPL-MCNC: 2.8 MG/DL — SIGNIFICANT CHANGE UP (ref 2.1–4.9)
PLATELET # BLD AUTO: 677 K/UL — HIGH (ref 130–400)
POTASSIUM SERPL-MCNC: 4.3 MMOL/L — SIGNIFICANT CHANGE UP (ref 3.5–5)
POTASSIUM SERPL-SCNC: 4.3 MMOL/L — SIGNIFICANT CHANGE UP (ref 3.5–5)
RBC # BLD: 2.73 M/UL — LOW (ref 4.2–5.4)
RBC # FLD: 16.8 % — HIGH (ref 11.5–14.5)
SODIUM SERPL-SCNC: 138 MMOL/L — SIGNIFICANT CHANGE UP (ref 135–146)
SPECIMEN SOURCE: SIGNIFICANT CHANGE UP
WBC # BLD: 8.28 K/UL — SIGNIFICANT CHANGE UP (ref 4.8–10.8)
WBC # FLD AUTO: 8.28 K/UL — SIGNIFICANT CHANGE UP (ref 4.8–10.8)

## 2021-05-30 RX ORDER — MAGNESIUM SULFATE 500 MG/ML
2 VIAL (ML) INJECTION ONCE
Refills: 0 | Status: COMPLETED | OUTPATIENT
Start: 2021-05-30 | End: 2021-05-31

## 2021-05-30 RX ORDER — MAGNESIUM SULFATE 500 MG/ML
2 VIAL (ML) INJECTION ONCE
Refills: 0 | Status: COMPLETED | OUTPATIENT
Start: 2021-05-30 | End: 2021-05-30

## 2021-05-30 RX ORDER — HYDROMORPHONE HYDROCHLORIDE 2 MG/ML
1 INJECTION INTRAMUSCULAR; INTRAVENOUS; SUBCUTANEOUS ONCE
Refills: 0 | Status: DISCONTINUED | OUTPATIENT
Start: 2021-05-30 | End: 2021-05-30

## 2021-05-30 RX ADMIN — PIPERACILLIN AND TAZOBACTAM 25 GRAM(S): 4; .5 INJECTION, POWDER, LYOPHILIZED, FOR SOLUTION INTRAVENOUS at 05:21

## 2021-05-30 RX ADMIN — CHLORHEXIDINE GLUCONATE 1 APPLICATION(S): 213 SOLUTION TOPICAL at 05:19

## 2021-05-30 RX ADMIN — Medication 650 MILLIGRAM(S): at 11:44

## 2021-05-30 RX ADMIN — HYDROMORPHONE HYDROCHLORIDE 1 MILLIGRAM(S): 2 INJECTION INTRAMUSCULAR; INTRAVENOUS; SUBCUTANEOUS at 11:44

## 2021-05-30 RX ADMIN — OXYCODONE HYDROCHLORIDE 5 MILLIGRAM(S): 5 TABLET ORAL at 21:30

## 2021-05-30 RX ADMIN — PIPERACILLIN AND TAZOBACTAM 25 GRAM(S): 4; .5 INJECTION, POWDER, LYOPHILIZED, FOR SOLUTION INTRAVENOUS at 13:46

## 2021-05-30 RX ADMIN — HYDROMORPHONE HYDROCHLORIDE 1 MILLIGRAM(S): 2 INJECTION INTRAMUSCULAR; INTRAVENOUS; SUBCUTANEOUS at 12:00

## 2021-05-30 RX ADMIN — HEPARIN SODIUM 5000 UNIT(S): 5000 INJECTION INTRAVENOUS; SUBCUTANEOUS at 13:46

## 2021-05-30 RX ADMIN — HEPARIN SODIUM 5000 UNIT(S): 5000 INJECTION INTRAVENOUS; SUBCUTANEOUS at 05:19

## 2021-05-30 RX ADMIN — Medication 650 MILLIGRAM(S): at 18:21

## 2021-05-30 RX ADMIN — POLYETHYLENE GLYCOL 3350 17 GRAM(S): 17 POWDER, FOR SOLUTION ORAL at 11:45

## 2021-05-30 RX ADMIN — PIPERACILLIN AND TAZOBACTAM 25 GRAM(S): 4; .5 INJECTION, POWDER, LYOPHILIZED, FOR SOLUTION INTRAVENOUS at 21:25

## 2021-05-30 RX ADMIN — Medication 650 MILLIGRAM(S): at 05:20

## 2021-05-30 RX ADMIN — OXYCODONE HYDROCHLORIDE 5 MILLIGRAM(S): 5 TABLET ORAL at 09:07

## 2021-05-30 RX ADMIN — Medication 650 MILLIGRAM(S): at 17:58

## 2021-05-30 RX ADMIN — Medication 12.5 GRAM(S): at 05:19

## 2021-05-30 RX ADMIN — HEPARIN SODIUM 5000 UNIT(S): 5000 INJECTION INTRAVENOUS; SUBCUTANEOUS at 21:31

## 2021-05-30 RX ADMIN — Medication 2.5 MILLIGRAM(S): at 18:00

## 2021-05-30 RX ADMIN — OXYCODONE HYDROCHLORIDE 5 MILLIGRAM(S): 5 TABLET ORAL at 09:37

## 2021-05-30 RX ADMIN — OXYCODONE HYDROCHLORIDE 5 MILLIGRAM(S): 5 TABLET ORAL at 01:02

## 2021-05-30 RX ADMIN — Medication 2.5 MILLIGRAM(S): at 05:22

## 2021-05-30 RX ADMIN — Medication 650 MILLIGRAM(S): at 12:00

## 2021-05-30 RX ADMIN — LISINOPRIL 20 MILLIGRAM(S): 2.5 TABLET ORAL at 05:20

## 2021-05-30 RX ADMIN — PANTOPRAZOLE SODIUM 40 MILLIGRAM(S): 20 TABLET, DELAYED RELEASE ORAL at 05:22

## 2021-05-30 NOTE — PROGRESS NOTE ADULT - SUBJECTIVE AND OBJECTIVE BOX
Wound vac was changed 5/30 pictures below              Wound vac of right breast was changed 5/30 pictures below

## 2021-05-30 NOTE — PROGRESS NOTE ADULT - ASSESSMENT
ASSESSMENT:  75y F w/ R Breast necrotic infection s/p multiple debridements. Now with wound vac. Cultures pending    PLAN:  - Change wound vac today  - F/U final cultures  - Monitor vitals  - Monitor labs and replete as necessary  - Monitor for bowel function  - Monitor urine output  - Continue Pain Medications  - Continue Antibiotics  - Encourage ambulation as tolerated  - DVT and GI Prophylaxis  - Follow PT/Physiatry  - Contact social work/case management for necessary patient needs.     Date/Time: 05-30-21 @ 07:10

## 2021-05-30 NOTE — PROGRESS NOTE ADULT - SUBJECTIVE AND OBJECTIVE BOX
GENERAL SURGERY PROGRESS NOTE    Patient: CASSIDY NOE , 75y ([birth year])Female   MRN: [id number]  Location: [address]  Visit: 05-18-21 Inpatient  Date: 05-30-21 @ 07:10    Hospital Day #: 13  Post-Op Day #: 3    Procedure/Dx/Injuries: R Breast necrotic infection s/p multiple debridements. Now with wound vac. Cultures pending  PAST MEDICAL & SURGICAL HISTORY:  Hypertension    Rheumatoid arthritis        Vitals:   T(F): 98.4 (05-30-21 @ 05:40), Max: 98.4 (05-29-21 @ 20:53)  HR: 79 (05-30-21 @ 05:40)  BP: 155/69 (05-30-21 @ 05:40)  RR: 18 (05-30-21 @ 05:40)  SpO2: --      Diet, Regular:   DASH/TLC Sodium & Cholesterol Restricted  Supplement Feeding Modality:  Oral  Ensure Enlive Cans or Servings Per Day:  1       Frequency:  Three Times a day      Fluids:     I & O's:    05-29-21 @ 07:01  -  05-30-21 @ 07:00  --------------------------------------------------------  IN:  Total IN: 0 mL    OUT:    Voided (mL): 300 mL  Total OUT: 300 mL    Total NET: -300 mL        PHYSICAL EXAM:  General Appearance: NAD  HEENT: EOMI, sclera non-icteric.  Heart: RRR   Lungs: CTABL.   Breast: R Breast with wound vac in place  Abdomen:  Soft, nontender, nondistended.   MSK/Extremities: Warm & well-perfused.   Skin: Warm, dry. No jaundice.       MEDICATIONS  (STANDING):  acetaminophen   Tablet .. 650 milliGRAM(s) Oral every 6 hours  chlorhexidine 4% Liquid 1 Application(s) Topical <User Schedule>  dronabinol 2.5 milliGRAM(s) Oral two times a day  heparin   Injectable 5000 Unit(s) SubCutaneous every 8 hours  lisinopril 20 milliGRAM(s) Oral daily  magnesium sulfate  IVPB 2 Gram(s) IV Intermittent once  pantoprazole    Tablet 40 milliGRAM(s) Oral before breakfast  piperacillin/tazobactam IVPB.. 3.375 Gram(s) IV Intermittent every 8 hours  polyethylene glycol 3350 17 Gram(s) Oral daily  senna 2 Tablet(s) Oral at bedtime    MEDICATIONS  (PRN):  oxyCODONE    IR 5 milliGRAM(s) Oral every 6 hours PRN Moderate Pain (4 - 6)  traZODone 50 milliGRAM(s) Oral at bedtime PRN insomnia/anxiety      DVT PROPHYLAXIS: heparin   Injectable 5000 Unit(s) SubCutaneous every 8 hours    GI PROPHYLAXIS: pantoprazole    Tablet 40 milliGRAM(s) Oral before breakfast    ANTICOAGULATION:   ANTIBIOTICS:  piperacillin/tazobactam IVPB.. 3.375 Gram(s)            LAB/STUDIES:  Labs:  CAPILLARY BLOOD GLUCOSE                              7.4    9.56  )-----------( 699      ( 29 May 2021 20:00 )             24.2       Auto Neutrophil %: 68.1 % (05-29-21 @ 20:00)  Auto Immature Granulocyte %: 0.4 % (05-29-21 @ 20:00)    05-29    139  |  106  |  15  ----------------------------<  93  4.5   |  27  |  0.7      eGFR if Non African American: 84 mL/min/1.73M2 (05-29-21 @ 20:00)        Culture - Acid Fast - Other w/Smear (collected 27 May 2021 20:00)  Source: .Other RIGHT BREAST  Preliminary Report (29 May 2021 15:04):    Culture is being performed.    Culture - Surgical Swab (collected 27 May 2021 20:00)  Source: .Surgical Swab RIGHT BREAST  Preliminary Report (29 May 2021 08:10):    No growth    Culture - Acid Fast - Other w/Smear (collected 27 May 2021 20:00)  Source: .Other RIGHT BREAST  Preliminary Report (29 May 2021 15:04):    Culture is being performed.    Culture - Acid Fast - Tissue w/Smear (collected 27 May 2021 20:00)  Source: .Tissue RT BREAST TISSUE  Preliminary Report (29 May 2021 15:04):    Culture is being performed.    Culture - Tissue with Gram Stain (collected 27 May 2021 20:00)  Source: .Tissue RIGHT BREAST TISSUE  Gram Stain (28 May 2021 13:43):    No polymorphonuclear leukocytes per low power field    No organisms seen per oil power field  Preliminary Report (29 May 2021 07:35):    No growth          ACCESS/ DEVICES:  [x ] Peripheral IV  [ ] Central Venous Line	[ ] R	[ ] L	[ ] IJ	[ ] Fem	[ ] SC	Placed:   [ ] Arterial Line		[ ] R	[ ] L	[ ] Fem	[ ] Rad	[ ] Ax	Placed:   [ ] PICC:					[ ] Mediport  [ ] Urinary Catheter,  Date Placed:   [ ] Chest tube: [ ] Right, [ ] Left  [ ] GELA/Rolando Drains

## 2021-05-31 LAB
ANION GAP SERPL CALC-SCNC: 8 MMOL/L — SIGNIFICANT CHANGE UP (ref 7–14)
BASOPHILS # BLD AUTO: 0.04 K/UL — SIGNIFICANT CHANGE UP (ref 0–0.2)
BASOPHILS NFR BLD AUTO: 0.5 % — SIGNIFICANT CHANGE UP (ref 0–1)
BUN SERPL-MCNC: 15 MG/DL — SIGNIFICANT CHANGE UP (ref 10–20)
CALCIUM SERPL-MCNC: 8.2 MG/DL — LOW (ref 8.5–10.1)
CHLORIDE SERPL-SCNC: 105 MMOL/L — SIGNIFICANT CHANGE UP (ref 98–110)
CO2 SERPL-SCNC: 25 MMOL/L — SIGNIFICANT CHANGE UP (ref 17–32)
CREAT SERPL-MCNC: 0.8 MG/DL — SIGNIFICANT CHANGE UP (ref 0.7–1.5)
EOSINOPHIL # BLD AUTO: 0.1 K/UL — SIGNIFICANT CHANGE UP (ref 0–0.7)
EOSINOPHIL NFR BLD AUTO: 1.3 % — SIGNIFICANT CHANGE UP (ref 0–8)
GLUCOSE SERPL-MCNC: 139 MG/DL — HIGH (ref 70–99)
HCT VFR BLD CALC: 25.5 % — LOW (ref 37–47)
HGB BLD-MCNC: 8 G/DL — LOW (ref 12–16)
IMM GRANULOCYTES NFR BLD AUTO: 0.3 % — SIGNIFICANT CHANGE UP (ref 0.1–0.3)
LYMPHOCYTES # BLD AUTO: 1.42 K/UL — SIGNIFICANT CHANGE UP (ref 1.2–3.4)
LYMPHOCYTES # BLD AUTO: 18 % — LOW (ref 20.5–51.1)
MAGNESIUM SERPL-MCNC: 1.7 MG/DL — LOW (ref 1.8–2.4)
MCHC RBC-ENTMCNC: 28.6 PG — SIGNIFICANT CHANGE UP (ref 27–31)
MCHC RBC-ENTMCNC: 31.4 G/DL — LOW (ref 32–37)
MCV RBC AUTO: 91.1 FL — SIGNIFICANT CHANGE UP (ref 81–99)
MONOCYTES # BLD AUTO: 0.85 K/UL — HIGH (ref 0.1–0.6)
MONOCYTES NFR BLD AUTO: 10.7 % — HIGH (ref 1.7–9.3)
NEUTROPHILS # BLD AUTO: 5.48 K/UL — SIGNIFICANT CHANGE UP (ref 1.4–6.5)
NEUTROPHILS NFR BLD AUTO: 69.2 % — SIGNIFICANT CHANGE UP (ref 42.2–75.2)
NRBC # BLD: 0 /100 WBCS — SIGNIFICANT CHANGE UP (ref 0–0)
PHOSPHATE SERPL-MCNC: 3.5 MG/DL — SIGNIFICANT CHANGE UP (ref 2.1–4.9)
PLATELET # BLD AUTO: 692 K/UL — HIGH (ref 130–400)
POTASSIUM SERPL-MCNC: 4.3 MMOL/L — SIGNIFICANT CHANGE UP (ref 3.5–5)
POTASSIUM SERPL-SCNC: 4.3 MMOL/L — SIGNIFICANT CHANGE UP (ref 3.5–5)
RBC # BLD: 2.8 M/UL — LOW (ref 4.2–5.4)
RBC # FLD: 17 % — HIGH (ref 11.5–14.5)
SODIUM SERPL-SCNC: 138 MMOL/L — SIGNIFICANT CHANGE UP (ref 135–146)
WBC # BLD: 7.91 K/UL — SIGNIFICANT CHANGE UP (ref 4.8–10.8)
WBC # FLD AUTO: 7.91 K/UL — SIGNIFICANT CHANGE UP (ref 4.8–10.8)

## 2021-05-31 RX ORDER — MAGNESIUM SULFATE 500 MG/ML
2 VIAL (ML) INJECTION ONCE
Refills: 0 | Status: COMPLETED | OUTPATIENT
Start: 2021-05-31 | End: 2021-05-31

## 2021-05-31 RX ORDER — SIMETHICONE 80 MG/1
80 TABLET, CHEWABLE ORAL ONCE
Refills: 0 | Status: COMPLETED | OUTPATIENT
Start: 2021-05-31 | End: 2021-05-31

## 2021-05-31 RX ADMIN — Medication 650 MILLIGRAM(S): at 11:33

## 2021-05-31 RX ADMIN — HEPARIN SODIUM 5000 UNIT(S): 5000 INJECTION INTRAVENOUS; SUBCUTANEOUS at 21:15

## 2021-05-31 RX ADMIN — Medication 2.5 MILLIGRAM(S): at 06:16

## 2021-05-31 RX ADMIN — OXYCODONE HYDROCHLORIDE 5 MILLIGRAM(S): 5 TABLET ORAL at 03:58

## 2021-05-31 RX ADMIN — HEPARIN SODIUM 5000 UNIT(S): 5000 INJECTION INTRAVENOUS; SUBCUTANEOUS at 13:22

## 2021-05-31 RX ADMIN — PANTOPRAZOLE SODIUM 40 MILLIGRAM(S): 20 TABLET, DELAYED RELEASE ORAL at 06:16

## 2021-05-31 RX ADMIN — Medication 25 GRAM(S): at 00:17

## 2021-05-31 RX ADMIN — POLYETHYLENE GLYCOL 3350 17 GRAM(S): 17 POWDER, FOR SOLUTION ORAL at 11:33

## 2021-05-31 RX ADMIN — PIPERACILLIN AND TAZOBACTAM 25 GRAM(S): 4; .5 INJECTION, POWDER, LYOPHILIZED, FOR SOLUTION INTRAVENOUS at 06:16

## 2021-05-31 RX ADMIN — Medication 650 MILLIGRAM(S): at 17:05

## 2021-05-31 RX ADMIN — PIPERACILLIN AND TAZOBACTAM 25 GRAM(S): 4; .5 INJECTION, POWDER, LYOPHILIZED, FOR SOLUTION INTRAVENOUS at 13:22

## 2021-05-31 RX ADMIN — SENNA PLUS 2 TABLET(S): 8.6 TABLET ORAL at 21:15

## 2021-05-31 RX ADMIN — Medication 62.5 MILLIMOLE(S): at 01:36

## 2021-05-31 RX ADMIN — LISINOPRIL 20 MILLIGRAM(S): 2.5 TABLET ORAL at 06:15

## 2021-05-31 RX ADMIN — OXYCODONE HYDROCHLORIDE 5 MILLIGRAM(S): 5 TABLET ORAL at 17:35

## 2021-05-31 RX ADMIN — PIPERACILLIN AND TAZOBACTAM 25 GRAM(S): 4; .5 INJECTION, POWDER, LYOPHILIZED, FOR SOLUTION INTRAVENOUS at 21:16

## 2021-05-31 RX ADMIN — Medication 650 MILLIGRAM(S): at 06:13

## 2021-05-31 RX ADMIN — SIMETHICONE 80 MILLIGRAM(S): 80 TABLET, CHEWABLE ORAL at 14:39

## 2021-05-31 RX ADMIN — Medication 650 MILLIGRAM(S): at 00:16

## 2021-05-31 RX ADMIN — Medication 50 GRAM(S): at 23:39

## 2021-05-31 RX ADMIN — OXYCODONE HYDROCHLORIDE 5 MILLIGRAM(S): 5 TABLET ORAL at 12:04

## 2021-05-31 RX ADMIN — CHLORHEXIDINE GLUCONATE 1 APPLICATION(S): 213 SOLUTION TOPICAL at 06:14

## 2021-05-31 RX ADMIN — OXYCODONE HYDROCHLORIDE 5 MILLIGRAM(S): 5 TABLET ORAL at 23:38

## 2021-05-31 RX ADMIN — Medication 650 MILLIGRAM(S): at 23:06

## 2021-05-31 RX ADMIN — HEPARIN SODIUM 5000 UNIT(S): 5000 INJECTION INTRAVENOUS; SUBCUTANEOUS at 06:15

## 2021-05-31 RX ADMIN — OXYCODONE HYDROCHLORIDE 5 MILLIGRAM(S): 5 TABLET ORAL at 17:05

## 2021-05-31 RX ADMIN — Medication 2.5 MILLIGRAM(S): at 17:05

## 2021-05-31 RX ADMIN — OXYCODONE HYDROCHLORIDE 5 MILLIGRAM(S): 5 TABLET ORAL at 11:34

## 2021-05-31 NOTE — PROGRESS NOTE ADULT - SUBJECTIVE AND OBJECTIVE BOX
CASSIDY NOE  75y, Female  Allergy: No Known Allergies      LOS  13d    CHIEF COMPLAINT: right breast abscess (31 May 2021 00:53)      INTERVAL EVENTS/HPI  - No acute events overnight  - T(F): , Max: 99 (05-30-21 @ 13:30)  - Denies any worsening symptoms  - Tolerating medication  - WBC Count: 8.28 (05-30-21 @ 21:14)  WBC Count: 9.56 (05-29-21 @ 20:00)     - Creatinine, Serum: 0.8 (05-30-21 @ 21:14)  Creatinine, Serum: 0.7 (05-29-21 @ 20:00)       ROS  General: Denies rigors, nightsweats  HEENT: Denies headache, rhinorrhea, sore throat, eye pain  CV: Denies CP, palpitations  PULM: Denies wheezing, hemoptysis  GI: Denies hematemesis, hematochezia, melena  : Denies discharge, hematuria  MSK: Denies arthralgias, myalgias  SKIN: Denies rash, lesions  NEURO: Denies paresthesias, weakness  PSYCH: Denies depression, anxiety    VITALS:  T(F): 97.8, Max: 99 (05-30-21 @ 13:30)  HR: 75  BP: 142/63  RR: 18Vital Signs Last 24 Hrs  T(C): 36.6 (31 May 2021 05:09), Max: 37.2 (30 May 2021 13:30)  T(F): 97.8 (31 May 2021 05:09), Max: 99 (30 May 2021 13:30)  HR: 75 (31 May 2021 05:09) (75 - 79)  BP: 142/63 (31 May 2021 05:09) (117/59 - 142/63)  BP(mean): --  RR: 18 (31 May 2021 05:09) (18 - 18)  SpO2: --    PHYSICAL EXAM:  Gen: NAD, resting in bed  HEENT: Normocephalic, atraumatic  Neck: supple, no lymphadenopathy  CV: Regular rate & regular rhythm  Lungs: decreased BS at bases, no fremitus  breast vac  Abdomen: Soft, BS present  Ext: Warm, well perfused  Neuro: non focal, awake  Skin: no rash, no erythema  Lines: no phlebitis    FH: Non-contributory  Social Hx: Non-contributory    TESTS & MEASUREMENTS:                        7.6    8.28  )-----------( 677      ( 30 May 2021 21:14 )             24.8     05-30    138  |  105  |  16  ----------------------------<  161<H>  4.3   |  25  |  0.8    Ca    8.0<L>      30 May 2021 21:14  Phos  2.8     05-30  Mg     1.7     05-30      eGFR if Non African American: 71 mL/min/1.73M2 (05-30-21 @ 21:14)  eGFR if : 82 mL/min/1.73M2 (05-30-21 @ 21:14)          Culture - Acid Fast - Other w/Smear (collected 05-27-21 @ 20:00)  Source: .Other RIGHT BREAST  Preliminary Report (05-29-21 @ 15:04):    Culture is being performed.    Culture - Surgical Swab (collected 05-27-21 @ 20:00)  Source: .Surgical Swab RIGHT BREAST  Preliminary Report (05-29-21 @ 08:10):    No growth    Culture - Acid Fast - Other w/Smear (collected 05-27-21 @ 20:00)  Source: .Other RIGHT BREAST  Preliminary Report (05-29-21 @ 15:04):    Culture is being performed.    Culture - Surgical Swab (collected 05-27-21 @ 20:00)  Source: .Surgical Swab right breast  Preliminary Report (05-30-21 @ 07:44):    Rare Coag Negative Staphylococcus    Culture - Acid Fast - Tissue w/Smear (collected 05-27-21 @ 20:00)  Source: .Tissue RT BREAST TISSUE  Preliminary Report (05-29-21 @ 15:04):    Culture is being performed.    Culture - Tissue with Gram Stain (collected 05-27-21 @ 20:00)  Source: .Tissue RIGHT BREAST TISSUE  Gram Stain (05-28-21 @ 13:43):    No polymorphonuclear leukocytes per low power field    No organisms seen per oil power field  Preliminary Report (05-29-21 @ 07:35):    No growth    Culture - Tissue with Gram Stain (collected 05-24-21 @ 23:00)  Source: .Tissue None  Gram Stain (05-25-21 @ 11:48):    Rare polymorphonuclear leukocytes per low power field    No organisms seen per oil power field  Final Report (05-30-21 @ 08:44):    No growth at 5 days    Culture - Blood (collected 05-24-21 @ 21:30)  Source: .Blood None  Final Report (05-30-21 @ 07:00):    No Growth Final    Culture - Tissue with Gram Stain (collected 05-22-21 @ 14:00)  Source: .Tissue None  Gram Stain (05-23-21 @ 02:35):    Few polymorphonuclear leukocytes seen per low power field    Few Gram positive cocci in pairs seen per oil power field    Rare Gram Variable Rods seen per oil power field  Final Report (05-29-21 @ 18:24):    Few Actinomyces turicensis "Susceptibilities not performed"    Few Most closely resembling [place] species "Susceptibilities not    performed"    Please note this organism may appear as gram positive cocci in pairs in    direct smears of clinical specimens    Culture - Tissue with Gram Stain (collected 05-20-21 @ 10:44)  Source: .Tissue None  Gram Stain (05-20-21 @ 21:54):    Rare polymorphonuclear leukocytes seen per low power field    Rare Gram Variable Rods seen per oil power field    Rare Gram positive cocci in pairs seen per oil power field  Final Report (05-23-21 @ 15:38):    Growth in fluid media only Coag Negative Staphylococcus    Moderate Anaerobic Gram Negative Rocky Most closely resembling    Prevotella species "Susceptibilities not performed"  Organism: Coag Negative Staphylococcus (05-23-21 @ 15:38)  Organism: Coag Negative Staphylococcus (05-23-21 @ 15:38)      -  Ampicillin/Sulbactam: R <=8/4      -  Cefazolin: R <=4      -  Clindamycin: R >4      -  Erythromycin: R >4      -  Gentamicin: S <=1 Should not be used as monotherapy      -  Oxacillin: R >2      -  Penicillin: R >8      -  RIF- Rifampin: S <=1 Should not be used as monotherapy      -  Tetra/Doxy: S <=1      -  Trimethoprim/Sulfamethoxazole: S <=0.5/9.5      -  Vancomycin: S 2      Method Type: RA    Culture - Urine (collected 05-18-21 @ 13:17)  Source: .Urine Clean Catch (Midstream)  Final Report (05-22-21 @ 09:44):    10,000 - 49,000 CFU/mL Escherichia coli    <10,000 CFU/ml Normal Urogenital johanna present  Organism: Escherichia coli (05-22-21 @ 09:44)  Organism: Escherichia coli (05-22-21 @ 09:44)      -  Amikacin: S <=16      -  Amoxicillin/Clavulanic Acid: S <=8/4      -  Ampicillin: R >16 These ampicillin results predict results for amoxicillin      -  Ampicillin/Sulbactam: I 16/8 Enterobacter, Citrobacter, and Serratia may develop resistance during prolonged therapy (3-4 days)      -  Aztreonam: S <=4      -  Cefazolin: S <=2 (MIC_CL_COM_ENTERIC_CEFAZU) For uncomplicated UTI with K. pneumoniae, E. coli, or P. mirablis: RA <=16 is sensitive and RA >=32 is resistant. This also predicts results for oral agents cefaclor, cefdinir, cefpodoxime, cefprozil, cefuroxime axetil, cephalexin and locarbef for uncomplicated UTI. Note that some isolates may be susceptible to these agents while testing resistant to cefazolin.      -  Cefepime: S <=2      -  Cefoxitin: S <=8      -  Ceftriaxone: S <=1 Enterobacter, Citrobacter, and Serratia may develop resistance during prolonged therapy      -  Ciprofloxacin: S <=0.25      -  Ertapenem: S <=0.5      -  Gentamicin: S <=2      -  Imipenem: S <=1      -  Levofloxacin: S <=0.5      -  Meropenem: S <=1      -  Nitrofurantoin: S <=32 Should not be used to treat pyelonephritis      -  Piperacillin/Tazobactam: S <=8      -  Tigecycline: S <=2      -  Tobramycin: S <=2      -  Trimethoprim/Sulfamethoxazole: R >2/38      Method Type: RA    Culture - Blood (collected 05-18-21 @ 11:00)  Source: .Blood Blood-Peripheral  Gram Stain (05-20-21 @ 21:44):    Growth in anaerobic bottle: Gram Positive Cocci in Clusters  Final Report (05-21-21 @ 18:02):    Growth in anaerobic bottle: Staphylococcus cohnii    Coag Negative Staphylococcus    Single set isolate, possible contaminant. Contact    Microbiology if susceptibility testing clinically    indicated.    ***Blood Panel PCR results on this specimen are available    approximately 3 hours after the Gram stain result.***    Gram stain, PCR, and/or culture results may not always    correspond due to difference in methodologies.    ************************************************************    This PCR assay was performed by multiplex PCR. This    Assay tests for 66 bacterial and resistance gene targets.    Please refer to the [facility] [employer] test directory    at [email or web address] for details.  Organism: Blood Culture PCR (05-21-21 @ 18:02)  Organism: Blood Culture PCR (05-21-21 @ 18:02)      -  Coagulase negative Staphylococcus: Detec      Method Type: PCR    Culture - Blood (collected 05-18-21 @ 11:00)  Source: .Blood Blood-Peripheral  Final Report (05-23-21 @ 23:00):    No Growth Final            INFECTIOUS DISEASES TESTING  COVID-19 PCR: NotDetec (05-26-21 @ 15:26)  COVID-19 PCR: NotDetec (05-23-21 @ 21:33)  COVID-19 PCR: NotDetec (05-21-21 @ 23:12)  MRSA PCR Result.: Negative (05-19-21 @ 10:40)  COVID-19 PCR: NotDetec (05-18-21 @ 10:14)      INFLAMMATORY MARKERS  Sedimentation Rate, Erythrocyte: 85 mm/Hr (05-21-21 @ 20:40)  C-Reactive Protein, Serum: 80 mg/L (05-21-21 @ 20:40)      RADIOLOGY & ADDITIONAL TESTS:  I have personally reviewed the last available Chest xray  CXR      CT      CARDIOLOGY TESTING  12 Lead ECG:   Ventricular Rate 89 BPM    Atrial Rate 89 BPM    P-R Interval 150 ms    QRS Duration 90 ms    Q-T Interval 366 ms    QTC Calculation(Bazett) 445 ms    P Axis 75 degrees    R Axis 78 degrees    T Axis 70 degrees    Diagnosis Line Normal sinus rhythm  Normal ECG    Confirmed by KENDRA HOLT MD ([id number]) on 5/18/2021 3:22:53 PM (05-18-21 @ 14:27)  12 Lead ECG:   Ventricular Rate 92 BPM    Atrial Rate 92 BPM    P-R Interval 142 ms    QRS Duration 78 ms    Q-T Interval 358 ms    QTC Calculation(Bazett) 442 ms    P Axis 75 degrees    R Axis 65 degrees    T Axis 67 degrees    Diagnosis Line Sinus rhythm with Premature atrial complexes  ST elevation, consider early repolarization  Borderline ECG    Confirmed by KENDRA HOLT MD ([id number]) on 5/18/2021 12:58:02 PM (05-18-21 @ 11:16)      MEDICATIONS  acetaminophen   Tablet .. 650 Oral every 6 hours  chlorhexidine 4% Liquid 1 Topical <User Schedule>  dronabinol 2.5 Oral two times a day  heparin   Injectable 5000 SubCutaneous every 8 hours  lisinopril 20 Oral daily  pantoprazole    Tablet 40 Oral before breakfast  piperacillin/tazobactam IVPB.. 3.375 IV Intermittent every 8 hours  polyethylene glycol 3350 17 Oral daily  senna 2 Oral at bedtime      WEIGHT  Weight (kg): 65.1 (05-27-21 @ 19:37)  Creatinine, Serum: 0.8 mg/dL (05-30-21 @ 21:14)      ANTIBIOTICS:  piperacillin/tazobactam IVPB.. 3.375 Gram(s) IV Intermittent every 8 hours      All available historical records have been reviewed

## 2021-05-31 NOTE — PROGRESS NOTE ADULT - ASSESSMENT
ASSESSMENT  75F w/ PMH of HTN and b/l lumpectomies (10-15 years ago, reportedly benign pathology) presented to the ED with worsening R breast pain. Pt reports R breast biopsy last week Tuesday, 5/11. She was told to keep the dressing on for 5 days. Since the biopsy, she has been experiencing R breast pain. 2 days ago, she removed the dressing and saw erythema and small amount of necrotic tissue overlying R underside of breast. This morning the abscess ruptured and began putting out pus and necrotic tissue.    IMPRESSION  #Resolved, Severe Sepsis on admission lactic acidosis due to necrotic/necrotizing fasciitis R breast abscess after biopsy, Pathology with + AFB and filamentous forms    Repeat OR AFB stain negative, CX pending     +  Actinomyces turicensis "Susceptibilities not performed"    5/24 WCX NG    5/24 BCX NGTD     5/22 WCX pending   s/p debridement 5/22 -- necrotizing soft tissue infection    s/p OR Exploration, wound, chest 20-May-2021 08:50:47 right breast Michelle Wagner  Incision and drainage, breast, with debridement 20-May-2021 08:51:08  Michelle Wagner  Irrigation and excisional debridement of musculofascial tissue 22-May-2021 09:04:56 right breast "AFB cultures sent, wound debrided to healthy tissue"    s/p OR WCX 5/20 --   Growth in fluid media only Coag Negative Staphylococcus- contaminant     Moderate Anaerobic Gram Negative Rocky Most closely resembling    Prevotella species "Susceptibilities not performed"  s/p Exploration, wound, chest 20-May-2021 08:50:47 right Michelle Wagner  Incision and drainage, breast, with debridement 20-May-2021 "extensive necrotizing soft tissue infection occupying more than 2/3rds of her breast, debrided to healthy tissue, packed with kerlex"    UCX   10,000 - 49,000 CFU/mL Escherichia coli (I unasyn, R bactrim)    s/p I&D exudate, purulent drainage, necrotic tissue    MRSA PCR Result.: Negative (05-19-21 @ 10:40)  #CoNS in bcx is a contaminant 1/4 bottles    5/18 1/4 bottles coNS  #Hyponatremia  Creatinine, Serum: 0.8 (05-19-21 @ 05:34)    Weight (kg): 63.3 (05-18-21 @ 16:27)    RECOMMENDATIONS  - f/u AFB culture and MODIFIED acid fast stain and culture for NOCARDIA stain; stain no AFB  - Actinomyces turicensis should NOT stain acid fast  - For actinomyces- Therapy often is needed for 6 months to a year- oral amoxicillin 500 mg three times daily on D/C for unknown duration 6-12mo   - Will need to f/u ID of AFB + organism and it is difficult to create an empiric regimen without known the species - may need to be decided as an outpatient   - f/u final WCX 5/20, 5/22  - continue zosyn 3.375 Gram(s) IV Intermittent every 8 hours  - Surgical Pathology Report:  Benign ulcerated/denuded skin and subcutaneous adipose tissue with coagulative necrosis, marked congestion, and acute  inflammation with abscess formation.- A mixed bacterial and mycobacterial colonization is present consisting of Gram (+) cocci in clusters, both Gram (+) and Gram  (-) bacilli seen singly and in filamentous forms, and scattered AFB (+) bacillus.(05.20.21 @ 08:22)  - No OR AFB sent initially  - Will discuss trying to perform PCR testing on AFB and sending to [place] with Pathology if OR cx today NG  - ID follow-up with Dr. Keegan Evangelista for Telehealth. We will call the patient between 10:30-6:30      [id number]       [phone]       Fax [phone]     Please call with any questions or send a message on [employer] Teams  Spectra [phone]

## 2021-05-31 NOTE — PROGRESS NOTE ADULT - SUBJECTIVE AND OBJECTIVE BOX
GENERAL SURGERY PROGRESS NOTE    Patient: CASSIDY NOE , 75y ([birth year])Female   MRN: [id number]  Location: [address]  Visit: 05-18-21 Inpatient  Date: 05-31-21 @ 00:58    Hospital day #13d  Post-Op Day #:      Events of past 24 hours: Patient seen and examined at bedside. No acute events overnight. Afebrile, VSS. wound vac changed 5/30    PAST MEDICAL & SURGICAL HISTORY:  Hypertension    Rheumatoid arthritis        Vitals:   T(F): 97.7 (05-30-21 @ 20:00), Max: 99 (05-30-21 @ 13:30)  HR: 76 (05-30-21 @ 20:00)  BP: 117/60 (05-30-21 @ 20:00)  RR: 18 (05-30-21 @ 20:00)  SpO2: --      Diet, Regular:   DASH/TLC Sodium & Cholesterol Restricted  Supplement Feeding Modality:  Oral  Ensure Enlive Cans or Servings Per Day:  1       Frequency:  Three Times a day      Fluids:     I & O's:    05-29-21 @ 07:01  -  05-30-21 @ 07:00  --------------------------------------------------------  IN:  Total IN: 0 mL    OUT:    Voided (mL): 300 mL  Total OUT: 300 mL    Total NET: -300 mL        PHYSICAL EXAM:  General Appearance: NAD  HEENT: EOMI, sclera non-icteric.  Heart: RRR   Lungs: No increased work of breathing or accessory muscle use. Symmetric chest wall rise and fall.   Abdomen:  Soft, nontender, nondistended.   MSK/Extremities: Warm & well-perfused.   Skin: Warm, dry. No jaundice.   Incision/wound: healing well, dressings in place, clean, dry and intact. right breast with wound vac in place     MEDICATIONS  (STANDING):  acetaminophen   Tablet .. 650 milliGRAM(s) Oral every 6 hours  chlorhexidine 4% Liquid 1 Application(s) Topical <User Schedule>  dronabinol 2.5 milliGRAM(s) Oral two times a day  heparin   Injectable 5000 Unit(s) SubCutaneous every 8 hours  lisinopril 20 milliGRAM(s) Oral daily  pantoprazole    Tablet 40 milliGRAM(s) Oral before breakfast  piperacillin/tazobactam IVPB.. 3.375 Gram(s) IV Intermittent every 8 hours  polyethylene glycol 3350 17 Gram(s) Oral daily  senna 2 Tablet(s) Oral at bedtime  sodium phosphate IVPB 15 milliMole(s) IV Intermittent once    MEDICATIONS  (PRN):  oxyCODONE    IR 5 milliGRAM(s) Oral every 6 hours PRN Moderate Pain (4 - 6)  traZODone 50 milliGRAM(s) Oral at bedtime PRN insomnia/anxiety      DVT PROPHYLAXIS: heparin   Injectable 5000 Unit(s) SubCutaneous every 8 hours    GI PROPHYLAXIS: pantoprazole    Tablet 40 milliGRAM(s) Oral before breakfast    ANTICOAGULATION:   ANTIBIOTICS:  piperacillin/tazobactam IVPB.. 3.375 Gram(s)            LAB/STUDIES:                          7.6    8.28  )-----------( 677      ( 30 May 2021 21:14 )             24.8       Auto Neutrophil %: 68.2 % (05-30-21 @ 21:14)  Auto Immature Granulocyte %: 0.2 % (05-30-21 @ 21:14)    05-30    138  |  105  |  16  ----------------------------<  161<H>  4.3   |  25  |  0.8      Calcium, Total Serum: 8.0 mg/dL (05-30-21 @ 21:14)          ACCESS/ DEVICES:  [x ] Peripheral IV

## 2021-05-31 NOTE — PROGRESS NOTE ADULT - ASSESSMENT
ASSESSMENT:  75y Female with a PMH of HTN and IBS with necrotic breast infection HD#14s/p multiple debridments  Patient seen and examined at bedside. NAD.   Tolerating: Diet, Regular      PLAN:  - wound vac changed 5/30. change again 6/1 coordinate with Dr. Montana's team  - AFB cultures negative, but rare coag negative staph, f/u ID  - Monitor vitals  - Monitor labs and replete as necessary  - Monitor for bowel function  - Continue Pain Medications if necessary  - Continue Antibiotics if necessary  - Encourage ambulation as tolerated  - Monitor urine output  - DVT and GI Prophylaxis  - Follow PT/Physiatry if necessary  - Contact social work/case management for necessary patient needs and dispo planning and wound vac for home needs set up    Lines/Tubes: MAXIM Wound vac      Date/Time: 05-31-21 @ 00:58

## 2021-06-01 LAB
ANION GAP SERPL CALC-SCNC: 6 MMOL/L — LOW (ref 7–14)
BUN SERPL-MCNC: 22 MG/DL — HIGH (ref 10–20)
CALCIUM SERPL-MCNC: 8 MG/DL — LOW (ref 8.5–10.1)
CHLORIDE SERPL-SCNC: 105 MMOL/L — SIGNIFICANT CHANGE UP (ref 98–110)
CO2 SERPL-SCNC: 28 MMOL/L — SIGNIFICANT CHANGE UP (ref 17–32)
CREAT SERPL-MCNC: 0.7 MG/DL — SIGNIFICANT CHANGE UP (ref 0.7–1.5)
GLUCOSE SERPL-MCNC: 105 MG/DL — HIGH (ref 70–99)
MAGNESIUM SERPL-MCNC: 1.7 MG/DL — LOW (ref 1.8–2.4)
PHOSPHATE SERPL-MCNC: 3.2 MG/DL — SIGNIFICANT CHANGE UP (ref 2.1–4.9)
POTASSIUM SERPL-MCNC: 4.3 MMOL/L — SIGNIFICANT CHANGE UP (ref 3.5–5)
POTASSIUM SERPL-SCNC: 4.3 MMOL/L — SIGNIFICANT CHANGE UP (ref 3.5–5)
SODIUM SERPL-SCNC: 139 MMOL/L — SIGNIFICANT CHANGE UP (ref 135–146)

## 2021-06-01 RX ORDER — KETOROLAC TROMETHAMINE 30 MG/ML
15 SYRINGE (ML) INJECTION ONCE
Refills: 0 | Status: DISCONTINUED | OUTPATIENT
Start: 2021-06-01 | End: 2021-06-01

## 2021-06-01 RX ORDER — GABAPENTIN 400 MG/1
300 CAPSULE ORAL THREE TIMES A DAY
Refills: 0 | Status: DISCONTINUED | OUTPATIENT
Start: 2021-06-01 | End: 2021-06-03

## 2021-06-01 RX ORDER — SODIUM HYPOCHLORITE 0.125 %
1 SOLUTION, NON-ORAL MISCELLANEOUS ONCE
Refills: 0 | Status: COMPLETED | OUTPATIENT
Start: 2021-06-01 | End: 2021-06-01

## 2021-06-01 RX ORDER — HYDROMORPHONE HYDROCHLORIDE 2 MG/ML
1 INJECTION INTRAMUSCULAR; INTRAVENOUS; SUBCUTANEOUS ONCE
Refills: 0 | Status: DISCONTINUED | OUTPATIENT
Start: 2021-06-01 | End: 2021-06-01

## 2021-06-01 RX ADMIN — Medication 650 MILLIGRAM(S): at 06:24

## 2021-06-01 RX ADMIN — GABAPENTIN 300 MILLIGRAM(S): 400 CAPSULE ORAL at 21:36

## 2021-06-01 RX ADMIN — Medication 650 MILLIGRAM(S): at 05:29

## 2021-06-01 RX ADMIN — Medication 2.5 MILLIGRAM(S): at 06:27

## 2021-06-01 RX ADMIN — GABAPENTIN 300 MILLIGRAM(S): 400 CAPSULE ORAL at 11:04

## 2021-06-01 RX ADMIN — HYDROMORPHONE HYDROCHLORIDE 1 MILLIGRAM(S): 2 INJECTION INTRAMUSCULAR; INTRAVENOUS; SUBCUTANEOUS at 11:04

## 2021-06-01 RX ADMIN — PIPERACILLIN AND TAZOBACTAM 25 GRAM(S): 4; .5 INJECTION, POWDER, LYOPHILIZED, FOR SOLUTION INTRAVENOUS at 05:30

## 2021-06-01 RX ADMIN — HEPARIN SODIUM 5000 UNIT(S): 5000 INJECTION INTRAVENOUS; SUBCUTANEOUS at 13:04

## 2021-06-01 RX ADMIN — Medication 650 MILLIGRAM(S): at 11:05

## 2021-06-01 RX ADMIN — Medication 2.5 MILLIGRAM(S): at 17:11

## 2021-06-01 RX ADMIN — PIPERACILLIN AND TAZOBACTAM 25 GRAM(S): 4; .5 INJECTION, POWDER, LYOPHILIZED, FOR SOLUTION INTRAVENOUS at 21:35

## 2021-06-01 RX ADMIN — PIPERACILLIN AND TAZOBACTAM 25 GRAM(S): 4; .5 INJECTION, POWDER, LYOPHILIZED, FOR SOLUTION INTRAVENOUS at 13:05

## 2021-06-01 RX ADMIN — OXYCODONE HYDROCHLORIDE 5 MILLIGRAM(S): 5 TABLET ORAL at 06:26

## 2021-06-01 RX ADMIN — CHLORHEXIDINE GLUCONATE 1 APPLICATION(S): 213 SOLUTION TOPICAL at 05:40

## 2021-06-01 RX ADMIN — Medication 650 MILLIGRAM(S): at 23:06

## 2021-06-01 RX ADMIN — HEPARIN SODIUM 5000 UNIT(S): 5000 INJECTION INTRAVENOUS; SUBCUTANEOUS at 21:35

## 2021-06-01 RX ADMIN — Medication 1 APPLICATION(S): at 11:16

## 2021-06-01 RX ADMIN — SENNA PLUS 2 TABLET(S): 8.6 TABLET ORAL at 21:36

## 2021-06-01 RX ADMIN — Medication 650 MILLIGRAM(S): at 17:11

## 2021-06-01 RX ADMIN — Medication 15 MILLIGRAM(S): at 11:06

## 2021-06-01 RX ADMIN — HEPARIN SODIUM 5000 UNIT(S): 5000 INJECTION INTRAVENOUS; SUBCUTANEOUS at 05:29

## 2021-06-01 RX ADMIN — PANTOPRAZOLE SODIUM 40 MILLIGRAM(S): 20 TABLET, DELAYED RELEASE ORAL at 05:29

## 2021-06-01 RX ADMIN — LISINOPRIL 20 MILLIGRAM(S): 2.5 TABLET ORAL at 05:28

## 2021-06-01 RX ADMIN — POLYETHYLENE GLYCOL 3350 17 GRAM(S): 17 POWDER, FOR SOLUTION ORAL at 11:05

## 2021-06-01 RX ADMIN — Medication 650 MILLIGRAM(S): at 00:05

## 2021-06-01 RX ADMIN — OXYCODONE HYDROCHLORIDE 5 MILLIGRAM(S): 5 TABLET ORAL at 00:20

## 2021-06-01 NOTE — CHART NOTE - NSCHARTNOTEFT_GEN_A_CORE
Registered Dietitian Follow-Up     Patient Profile Reviewed                           Yes [x]   No []     Nutrition History Previously Obtained        Yes [x]  No []     Pertinent Subjective Information: Pt reports appetite is still improving and she is eating a little better each day, still drinking Ensure Enlive 2-3 times daily. Likely this is pt's baseline; all nutrition interventions in place.       Pertinent Medical Interventions: Pt s/p multiple debridements of right breast abscess; wound vac change today. Per ID, will need to f/u ID of AFB + organism and it is difficult to create an empiric regimen without known the species; may need to be decided as an outpatient.     Diet order: Regular, DASH/TLC + Ensure Enlive TID      Anthropometrics:  - Ht. 60"  - Wt. 149#/67.8kg (6/1)--fluctuating wts noted, likely 2/2 bed-scale inaccuracy   (5/28): 139#/63.1kg   (5/25): 144#/65.1kg   (5/23): 155#/70.5kg   (5/18): 140#/63.3kg   - %wt change  - BMI: 28.0 using new dosing wt  - #      Pertinent Lab Data: (5/31): H/H 8.0/25.5, Gluc 139, Mg 1.7     Pertinent Meds: heparin, abx, marinol, lisinopril, protonix, miralax, senna      Physical Findings:  - Appearance: alert and oriented; 1+ edema to R breast noted   - GI function: LBM 5/31  - Tubes: N/A   - Oral/Mouth cavity: none reported   - Skin: surgical incision      Nutrition Requirements  Weight Used: lowest wt this adm: 140#/63.3kg; needs readjusted 2/2 severe PCM      Estimated Energy Needs: 2398-7610 kcal/day (MSJ x 1.2-1.5 AF)  Estimated Protein Needs: 76-95 gm/day (1.2-1.5 gm/kg CBW)  Estimated Fluid Needs: 1 ml/kcal      Nutrient Intake: likely meeting lower end of kcal/pro needs     Previous Nutrition Diagnosis: 1) Inadequate Oral Intake, 2) Severe PCM (resolved)     Nutrition Intervention: meals and snacks, medical food supplements, nutrition-related medication management     Recommendations:  1. Continue current diet order and oral supplementation      Goal/Expected Outcome: Pt to consume >75% of meals, snacks, and supplements within 7 days      Indicator/Monitoring: RD to monitor energy intake, body composition, glucose/electrolyte profiles, NFPF.

## 2021-06-01 NOTE — PROGRESS NOTE ADULT - ASSESSMENT
ASSESSMENT:  75y F w/ PMHx of  HTN, IBS s/p debridement of right breast    PLAN:  -c/w zosyn  -f/u cultures  -f/u ID  -Wound vac change today  -vitals  -labs  -repletions       75y F w/ PMHx of  HTN, IBS s/p multiple debridements of right breast abscess.    PLAN:  - s/w Dr. Magali Rose [phone] rare coag negative staph should be disregarded and patient should be discharged with oral amoxicillin for 3 months duration. final duration of abx will be determined in the outpatient setting with Dr. Keegan Rose.  - will c/w zosyn while in house  -f/u cultures  -f/u ID  -Wound vac change today   -vitals  -labs  -repletions

## 2021-06-01 NOTE — PROGRESS NOTE ADULT - SUBJECTIVE AND OBJECTIVE BOX
GENERAL SURGERY PROGRESS NOTE    Patient: CASSIDY NOE , 75y ([birth year])Female   MRN: [id number]  Location: [address]  Visit: 05-18-21 Inpatient  Date: 06-01-21 @ 09:36    Hospital Day #: 15  Post-Op Day #: 5    Procedure/Dx/Injuries: debridement    Events of past 24 hours: no acute events    PAST MEDICAL & SURGICAL HISTORY:  Hypertension    Rheumatoid arthritis        Vitals:   T(F): 98.1 (06-01-21 @ 05:00), Max: 99 (05-31-21 @ 21:00)  HR: 71 (06-01-21 @ 05:00)  BP: 149/67 (06-01-21 @ 05:00)  RR: 18 (06-01-21 @ 05:00)  SpO2: --      Diet, Regular:   DASH/TLC Sodium & Cholesterol Restricted  Supplement Feeding Modality:  Oral  Ensure Enlive Cans or Servings Per Day:  1       Frequency:  Three Times a day      Fluids:     I & O's:    05-31-21 @ 07:01  -  06-01-21 @ 07:00  --------------------------------------------------------  IN:  Total IN: 0 mL    OUT:    Voided (mL): 250 mL  Total OUT: 250 mL    Total NET: -250 mL          PHYSICAL EXAM:  General: NAD, AAOx3, calm and cooperative  HEENT: NCAT, ELODIA, EOMI, Trachea ML, Neck supple  Cardiac: RRR S1, S2, no Murmurs, rubs or gallops  Respiratory: CTAB, normal respiratory effort, breath sounds equal BL  Breast: right breast in [place] seal  Abdomen: Soft, non-distended, non-tender, no rebound, no guarding. +BS.  Extremities: Pulses 2+ throughout, extremities well perfused  Incision/wound: healing well, dressings in place, clean, dry and intact    MEDICATIONS  (STANDING):  acetaminophen   Tablet .. 650 milliGRAM(s) Oral every 6 hours  chlorhexidine 4% Liquid 1 Application(s) Topical <User Schedule>  Dakins Solution - 1/4 Strength 1 Application(s) Topical once  dronabinol 2.5 milliGRAM(s) Oral two times a day  heparin   Injectable 5000 Unit(s) SubCutaneous every 8 hours  lisinopril 20 milliGRAM(s) Oral daily  pantoprazole    Tablet 40 milliGRAM(s) Oral before breakfast  piperacillin/tazobactam IVPB.. 3.375 Gram(s) IV Intermittent every 8 hours  polyethylene glycol 3350 17 Gram(s) Oral daily  senna 2 Tablet(s) Oral at bedtime    MEDICATIONS  (PRN):  oxyCODONE    IR 5 milliGRAM(s) Oral every 6 hours PRN Moderate Pain (4 - 6)  traZODone 50 milliGRAM(s) Oral at bedtime PRN insomnia/anxiety      DVT PROPHYLAXIS: heparin   Injectable 5000 Unit(s) SubCutaneous every 8 hours    GI PROPHYLAXIS: pantoprazole    Tablet 40 milliGRAM(s) Oral before breakfast    ANTICOAGULATION:   ANTIBIOTICS:  piperacillin/tazobactam IVPB.. 3.375 Gram(s)            LAB/STUDIES:  Labs:  CAPILLARY BLOOD GLUCOSE                              8.0    7.91  )-----------( 692      ( 31 May 2021 20:32 )             25.5       Auto Neutrophil %: 69.2 % (05-31-21 @ 20:32)  Auto Immature Granulocyte %: 0.3 % (05-31-21 @ 20:32)    05-31    138  |  105  |  15  ----------------------------<  139<H>  4.3   |  25  |  0.8      Calcium, Total Serum: 8.2 mg/dL (05-31-21 @ 20:32)      LFTs:         Coags:                       GENERAL SURGERY PROGRESS NOTE    Patient: CASSIDY NOE , 75y ([birth year])Female   MRN: [id number]  Location: [address]  Visit: 05-18-21 Inpatient  Date: 06-01-21 @ 09:36    Hospital Day #: 15  Post-Op Day #: 5    Procedure/Dx/Injuries: debridement    Events of past 24 hours: no acute events    PAST MEDICAL & SURGICAL HISTORY:  Hypertension    Rheumatoid arthritis        Vitals:   T(F): 98.1 (06-01-21 @ 05:00), Max: 99 (05-31-21 @ 21:00)  HR: 71 (06-01-21 @ 05:00)  BP: 149/67 (06-01-21 @ 05:00)  RR: 18 (06-01-21 @ 05:00)  SpO2: --      Diet, Regular:   DASH/TLC Sodium & Cholesterol Restricted  Supplement Feeding Modality:  Oral  Ensure Enlive Cans or Servings Per Day:  1       Frequency:  Three Times a day      Fluids:     I & O's:    05-31-21 @ 07:01  -  06-01-21 @ 07:00  --------------------------------------------------------  IN:  Total IN: 0 mL    OUT:    Voided (mL): 250 mL  Total OUT: 250 mL    Total NET: -250 mL    Bowel Movements +  Flatus +      PHYSICAL EXAM:  General: NAD, AAOx3, calm and cooperative  HEENT: NCAT, ELODIA, EOMI, Trachea ML, Neck supple  Cardiac: RRR S1, S2, no Murmurs, rubs or gallops  Respiratory: CTAB, normal respiratory effort, breath sounds equal BL  Breast: right breast in [place] seal  Abdomen: Soft, non-distended, non-tender, no rebound, no guarding. +BS.  Extremities: Pulses 2+ throughout, extremities well perfused  Incision/wound: healing well, dressings in place, clean, dry and intact    MEDICATIONS  (STANDING):  acetaminophen   Tablet .. 650 milliGRAM(s) Oral every 6 hours  chlorhexidine 4% Liquid 1 Application(s) Topical <User Schedule>  Dakins Solution - 1/4 Strength 1 Application(s) Topical once  dronabinol 2.5 milliGRAM(s) Oral two times a day  heparin   Injectable 5000 Unit(s) SubCutaneous every 8 hours  lisinopril 20 milliGRAM(s) Oral daily  pantoprazole    Tablet 40 milliGRAM(s) Oral before breakfast  piperacillin/tazobactam IVPB.. 3.375 Gram(s) IV Intermittent every 8 hours  polyethylene glycol 3350 17 Gram(s) Oral daily  senna 2 Tablet(s) Oral at bedtime    MEDICATIONS  (PRN):  oxyCODONE    IR 5 milliGRAM(s) Oral every 6 hours PRN Moderate Pain (4 - 6)  traZODone 50 milliGRAM(s) Oral at bedtime PRN insomnia/anxiety      DVT PROPHYLAXIS: heparin   Injectable 5000 Unit(s) SubCutaneous every 8 hours    GI PROPHYLAXIS: pantoprazole    Tablet 40 milliGRAM(s) Oral before breakfast    ANTICOAGULATION:   ANTIBIOTICS:  piperacillin/tazobactam IVPB.. 3.375 Gram(s)            LAB/STUDIES:  Labs:  CAPILLARY BLOOD GLUCOSE                              8.0    7.91  )-----------( 692      ( 31 May 2021 20:32 )             25.5       Auto Neutrophil %: 69.2 % (05-31-21 @ 20:32)  Auto Immature Granulocyte %: 0.3 % (05-31-21 @ 20:32)    05-31    138  |  105  |  15  ----------------------------<  139<H>  4.3   |  25  |  0.8      Calcium, Total Serum: 8.2 mg/dL (05-31-21 @ 20:32)      LFTs:         Coags:

## 2021-06-01 NOTE — CHART NOTE - NSCHARTNOTESELECT_GEN_ALL_CORE
Event Note
PACU Admission/Event Note
PACU/Transfer Note
POST OP CHECK/Event Note
post op/Event Note
Anesthesia
Event Note
Event Note
ID/Event Note
1-2x/week

## 2021-06-02 ENCOUNTER — TRANSCRIPTION ENCOUNTER (OUTPATIENT)
Age: 76
End: 2021-06-02

## 2021-06-02 RX ORDER — MAGNESIUM SULFATE 500 MG/ML
2 VIAL (ML) INJECTION ONCE
Refills: 0 | Status: COMPLETED | OUTPATIENT
Start: 2021-06-02 | End: 2021-06-02

## 2021-06-02 RX ADMIN — Medication 2.5 MILLIGRAM(S): at 06:08

## 2021-06-02 RX ADMIN — GABAPENTIN 300 MILLIGRAM(S): 400 CAPSULE ORAL at 13:11

## 2021-06-02 RX ADMIN — HEPARIN SODIUM 5000 UNIT(S): 5000 INJECTION INTRAVENOUS; SUBCUTANEOUS at 22:28

## 2021-06-02 RX ADMIN — PIPERACILLIN AND TAZOBACTAM 25 GRAM(S): 4; .5 INJECTION, POWDER, LYOPHILIZED, FOR SOLUTION INTRAVENOUS at 22:28

## 2021-06-02 RX ADMIN — Medication 650 MILLIGRAM(S): at 17:52

## 2021-06-02 RX ADMIN — PIPERACILLIN AND TAZOBACTAM 25 GRAM(S): 4; .5 INJECTION, POWDER, LYOPHILIZED, FOR SOLUTION INTRAVENOUS at 05:29

## 2021-06-02 RX ADMIN — HEPARIN SODIUM 5000 UNIT(S): 5000 INJECTION INTRAVENOUS; SUBCUTANEOUS at 05:28

## 2021-06-02 RX ADMIN — GABAPENTIN 300 MILLIGRAM(S): 400 CAPSULE ORAL at 22:28

## 2021-06-02 RX ADMIN — Medication 650 MILLIGRAM(S): at 12:38

## 2021-06-02 RX ADMIN — PIPERACILLIN AND TAZOBACTAM 25 GRAM(S): 4; .5 INJECTION, POWDER, LYOPHILIZED, FOR SOLUTION INTRAVENOUS at 13:13

## 2021-06-02 RX ADMIN — GABAPENTIN 300 MILLIGRAM(S): 400 CAPSULE ORAL at 05:29

## 2021-06-02 RX ADMIN — Medication 25 GRAM(S): at 03:54

## 2021-06-02 RX ADMIN — LISINOPRIL 20 MILLIGRAM(S): 2.5 TABLET ORAL at 05:28

## 2021-06-02 RX ADMIN — OXYCODONE HYDROCHLORIDE 5 MILLIGRAM(S): 5 TABLET ORAL at 17:39

## 2021-06-02 RX ADMIN — Medication 12.5 GRAM(S): at 17:40

## 2021-06-02 RX ADMIN — OXYCODONE HYDROCHLORIDE 5 MILLIGRAM(S): 5 TABLET ORAL at 03:12

## 2021-06-02 RX ADMIN — SENNA PLUS 2 TABLET(S): 8.6 TABLET ORAL at 22:29

## 2021-06-02 RX ADMIN — PANTOPRAZOLE SODIUM 40 MILLIGRAM(S): 20 TABLET, DELAYED RELEASE ORAL at 05:28

## 2021-06-02 RX ADMIN — HEPARIN SODIUM 5000 UNIT(S): 5000 INJECTION INTRAVENOUS; SUBCUTANEOUS at 13:11

## 2021-06-02 RX ADMIN — Medication 650 MILLIGRAM(S): at 17:39

## 2021-06-02 RX ADMIN — Medication 2.5 MILLIGRAM(S): at 18:02

## 2021-06-02 RX ADMIN — Medication 650 MILLIGRAM(S): at 05:29

## 2021-06-02 RX ADMIN — POLYETHYLENE GLYCOL 3350 17 GRAM(S): 17 POWDER, FOR SOLUTION ORAL at 12:38

## 2021-06-02 RX ADMIN — Medication 650 MILLIGRAM(S): at 12:41

## 2021-06-02 NOTE — DISCHARGE NOTE PROVIDER - CARE PROVIDERS DIRECT ADDRESSES
,[email or web address],DirectAddress_Unknown

## 2021-06-02 NOTE — DISCHARGE NOTE PROVIDER - DETAILS OF MALNUTRITION DIAGNOSIS/DIAGNOSES
This patient has been assessed with a concern for Malnutrition and was treated during this hospitalization for the following Nutrition diagnosis/diagnoses:     -  05/25/2021: Severe protein-calorie malnutrition

## 2021-06-02 NOTE — PROGRESS NOTE ADULT - ASSESSMENT
75y F w/ PMHx of  HTN, IBS s/p multiple debridements of right breast abscess.    PLAN:  - discharge home today pending home wound vac delivery  - ID recs appreciated: PO amoxiciliin 500 mg TID for unknown duration, continue Zosyn while inpatient  - f/u cultures  - Monitor vitals  - Monitor labs and replete as necessary  - Ambulate  - DVT and GI Prophylaxis        Lines/Tubes: PIV, wound vac

## 2021-06-02 NOTE — DISCHARGE NOTE PROVIDER - HOSPITAL COURSE
Patient is a 75F w/ PMH of HTN and b/l lumpectomies (10-15 years ago, reportedly benign pathology) presented to the ED with worsening R breast pain. Pt reports R breast biopsy last week Tuesday, 5/11. She was told to keep the dressing on for 5 days. Since the biopsy, she has been experiencing R breast pain. 2 days ago, she removed the dressing and saw erythema and small amount of necrotic tissue overlying R underside of breast. This morning the abscess ruptured and began putting out pus and necrotic tissue. Patient reported 30 pound weight loss in the past 3 months. Denies PMH of cancer. In the ED, pt tachycardic to 110s, febrile to 100.9F. WBC 16.7, lactate 2.6. Received 1950cc LR, vancomycin, zosyn, and clindamycin. Patient was admitted to the breast surgical service and was brought to the OR on 5/20, 5/22, 5/24, and 5/27 for multiple wound explorations and debridements. ID recommended Zosyn. Wound vac was applied to right breast on 5/24. Patient was evaluated by plastic surgery for eventual wound closure and reconstruction. Patient tolerated procedures well. Wound was debrided down to healthy tissue. Patient has been afebrile, WBC is within normal limits. Per infectious disease, patient will need to be discharged on PO antibiotics long term.

## 2021-06-02 NOTE — DISCHARGE NOTE PROVIDER - NSDCCPCAREPLAN_GEN_ALL_CORE_FT
PRINCIPAL DISCHARGE DIAGNOSIS  Diagnosis: Necrotizing soft tissue infection  Assessment and Plan of Treatment: Wound: You will be discharged with the wound vac. This will need to be changed 3x per week.  Diet: Continue diet as tolerated.  Antibiotics: Please take amoxicillin three times per day. Please follow up with Dr. Rose (infectious disease).  Follow up: You were found to have a pulmonary nodule. Please follow up with pulmonology, .      SECONDARY DISCHARGE DIAGNOSES  Diagnosis: Breast infection in female  Assessment and Plan of Treatment:     Diagnosis: Hypokalemia  Assessment and Plan of Treatment:      PRINCIPAL DISCHARGE DIAGNOSIS  Diagnosis: Necrotizing soft tissue infection  Assessment and Plan of Treatment: Wound: You will be discharged with the wound vac. This will need to be changed 3x per week.  Diet: Continue diet as tolerated.  Antibiotics: Please take amoxicillin three times per day. Please follow up with Dr. Rose (infectious disease).  Follow up: You were found to have a pulmonary nodule. Please follow up with pulmonology for further work up. Please also follow up with Dr. Montana (plastic surgery) outpatient for plan for reconstruction. Please call the number provided to schedule an appointment with Dr. Wagner next week. If you develop worsening pain, fevers/chills, pus from the wound, worsening redness around the wound, or any other concerns, call the office or return to the ED.      SECONDARY DISCHARGE DIAGNOSES  Diagnosis: Breast infection in female  Assessment and Plan of Treatment:     Diagnosis: Hypokalemia  Assessment and Plan of Treatment:

## 2021-06-02 NOTE — DISCHARGE NOTE PROVIDER - PROVIDER TOKENS
PROVIDER:[TOKEN:[[id number]]],PROVIDER:[TOKEN:[[id number]]],PROVIDER:[TOKEN:[[id number]]]

## 2021-06-02 NOTE — DISCHARGE NOTE PROVIDER - CARE PROVIDER_API CALL
Ca Montana)  [facility]  [address] [facility], [address]  [place] [address]  Phone: [phone]  Fax: [phone]  Follow Up Time:     Michelle Wagner)  Surgery  [facility], [address]nd [place] [address]  Phone: [phone]  Fax: [phone]  Follow Up Time:     Fco Coon  CRITICAL CARE MEDICINE  [address] [place] [address]  Phone: [phone]  Fax: [phone]  Follow Up Time:

## 2021-06-02 NOTE — DISCHARGE NOTE PROVIDER - NSDCMRMEDTOKEN_GEN_ALL_CORE_FT
LISINOPRIL 20MG TABLETS: TAKE 1 TABLET BY MOUTH EVERY DAY FOR 90 DOSES   acetaminophen 325 mg oral tablet: 2 tab(s) orally every 6 hours  LISINOPRIL 20MG TABLETS: TAKE 1 TABLET BY MOUTH EVERY DAY FOR 90 DOSES   acetaminophen 325 mg oral tablet: 2 tab(s) orally every 6 hours  Dilaudid 2 mg oral tablet: 1 tab(s) orally every other day MDD:1 tab  gabapentin 300 mg oral capsule: 1 cap(s) orally 3 times a day  LISINOPRIL 20MG TABLETS: TAKE 1 TABLET BY MOUTH EVERY DAY FOR 90 DOSES

## 2021-06-02 NOTE — PROGRESS NOTE ADULT - SUBJECTIVE AND OBJECTIVE BOX
GENERAL SURGERY PROGRESS NOTE    Patient: CASSIDY NOE , 75y ([birth year])Female   MRN: [id number]  Location: [address]  Visit: 05-18-21 Inpatient  Date: 06-02-21 @ 09:14    Hospital Day #: 16  Post-Op Day #: 6    Procedure/Dx/Injuries: s/p wound exploration, irrigation and excisional debridement     Events of past 24 hours: No acute events. Wound vac changed yesterday    PAST MEDICAL & SURGICAL HISTORY:  Hypertension  Rheumatoid arthritis      Vitals:   T(F): 98.2 (06-02-21 @ 04:59), Max: 98.7 (06-02-21 @ 01:05)  HR: 87 (06-02-21 @ 04:59)  BP: 146/67 (06-02-21 @ 04:59)  RR: 18 (06-02-21 @ 04:59)  SpO2: --      Diet, Regular:   DASH/TLC Sodium & Cholesterol Restricted  Supplement Feeding Modality:  Oral  Ensure Enlive Cans or Servings Per Day:  1       Frequency:  Three Times a day      Fluids:     I & O's:    Bowel Movement: : [] YES [] NO  Flatus: : [] YES [] NO    PHYSICAL EXAM:  General: NAD, AAOx3, calm and cooperative  Breast: Wound vac in place    MEDICATIONS  (STANDING):  acetaminophen   Tablet .. 650 milliGRAM(s) Oral every 6 hours  chlorhexidine 4% Liquid 1 Application(s) Topical <User Schedule>  dronabinol 2.5 milliGRAM(s) Oral two times a day  gabapentin 300 milliGRAM(s) Oral three times a day  heparin   Injectable 5000 Unit(s) SubCutaneous every 8 hours  lisinopril 20 milliGRAM(s) Oral daily  pantoprazole    Tablet 40 milliGRAM(s) Oral before breakfast  piperacillin/tazobactam IVPB.. 3.375 Gram(s) IV Intermittent every 8 hours  polyethylene glycol 3350 17 Gram(s) Oral daily  senna 2 Tablet(s) Oral at bedtime    MEDICATIONS  (PRN):  oxyCODONE    IR 5 milliGRAM(s) Oral every 6 hours PRN Moderate Pain (4 - 6)  traZODone 50 milliGRAM(s) Oral at bedtime PRN insomnia/anxiety      DVT PROPHYLAXIS: heparin   Injectable 5000 Unit(s) SubCutaneous every 8 hours    GI PROPHYLAXIS: pantoprazole    Tablet 40 milliGRAM(s) Oral before breakfast    ANTICOAGULATION:   ANTIBIOTICS:  piperacillin/tazobactam IVPB.. 3.375 Gram(s)    LAB/STUDIES:               8.0    7.91  )-----------( 692      ( 31 May 2021 20:32 )             25.5         06-01    139  |  105  |  22<H>  ----------------------------<  105<H>  4.3   |  28  |  0.7      Calcium, Total Serum: 8.0 mg/dL (06-01-21 @ 21:16)      IMAGING:      ACCESS/ DEVICES:  [x] Peripheral IV  [ ] Central Venous Line	[ ] R	[ ] L	[ ] IJ	[ ] Fem	[ ] SC	Placed:   [ ] Arterial Line		[ ] R	[ ] L	[ ] Fem	[ ] Rad	[ ] Ax	Placed:   [ ] PICC:					[ ] Mediport  [ ] Urinary Catheter,  Date Placed:   [ ] Chest tube: [ ] Right, [ ] Left  [ ] GELA/Rolando Drains

## 2021-06-03 VITALS
TEMPERATURE: 98 F | RESPIRATION RATE: 20 BRPM | SYSTOLIC BLOOD PRESSURE: 119 MMHG | HEART RATE: 78 BPM | DIASTOLIC BLOOD PRESSURE: 62 MMHG

## 2021-06-03 RX ORDER — ACETAMINOPHEN 500 MG
2 TABLET ORAL
Qty: 0 | Refills: 0 | DISCHARGE
Start: 2021-06-03

## 2021-06-03 RX ORDER — GABAPENTIN 400 MG/1
1 CAPSULE ORAL
Qty: 42 | Refills: 0
Start: 2021-06-03 | End: 2021-06-16

## 2021-06-03 RX ORDER — OXYCODONE HYDROCHLORIDE 5 MG/1
1 TABLET ORAL
Qty: 20 | Refills: 0
Start: 2021-06-03

## 2021-06-03 RX ORDER — HYDROMORPHONE HYDROCHLORIDE 2 MG/ML
1 INJECTION INTRAMUSCULAR; INTRAVENOUS; SUBCUTANEOUS
Qty: 14 | Refills: 0
Start: 2021-06-03

## 2021-06-03 RX ORDER — HYDROMORPHONE HYDROCHLORIDE 2 MG/ML
0.5 INJECTION INTRAMUSCULAR; INTRAVENOUS; SUBCUTANEOUS ONCE
Refills: 0 | Status: DISCONTINUED | OUTPATIENT
Start: 2021-06-03 | End: 2021-06-03

## 2021-06-03 RX ORDER — AMOXICILLIN 250 MG/5ML
1 SUSPENSION, RECONSTITUTED, ORAL (ML) ORAL
Qty: 84 | Refills: 2
Start: 2021-06-03 | End: 2021-08-25

## 2021-06-03 RX ADMIN — GABAPENTIN 300 MILLIGRAM(S): 400 CAPSULE ORAL at 05:39

## 2021-06-03 RX ADMIN — CHLORHEXIDINE GLUCONATE 1 APPLICATION(S): 213 SOLUTION TOPICAL at 05:38

## 2021-06-03 RX ADMIN — PANTOPRAZOLE SODIUM 40 MILLIGRAM(S): 20 TABLET, DELAYED RELEASE ORAL at 11:45

## 2021-06-03 RX ADMIN — Medication 2.5 MILLIGRAM(S): at 07:37

## 2021-06-03 RX ADMIN — Medication 650 MILLIGRAM(S): at 11:44

## 2021-06-03 RX ADMIN — Medication 650 MILLIGRAM(S): at 00:01

## 2021-06-03 RX ADMIN — Medication 650 MILLIGRAM(S): at 11:46

## 2021-06-03 RX ADMIN — GABAPENTIN 300 MILLIGRAM(S): 400 CAPSULE ORAL at 13:00

## 2021-06-03 RX ADMIN — LISINOPRIL 20 MILLIGRAM(S): 2.5 TABLET ORAL at 05:39

## 2021-06-03 RX ADMIN — OXYCODONE HYDROCHLORIDE 5 MILLIGRAM(S): 5 TABLET ORAL at 02:51

## 2021-06-03 RX ADMIN — HEPARIN SODIUM 5000 UNIT(S): 5000 INJECTION INTRAVENOUS; SUBCUTANEOUS at 05:39

## 2021-06-03 RX ADMIN — POLYETHYLENE GLYCOL 3350 17 GRAM(S): 17 POWDER, FOR SOLUTION ORAL at 11:45

## 2021-06-03 RX ADMIN — HYDROMORPHONE HYDROCHLORIDE 0.5 MILLIGRAM(S): 2 INJECTION INTRAMUSCULAR; INTRAVENOUS; SUBCUTANEOUS at 11:06

## 2021-06-03 RX ADMIN — Medication 650 MILLIGRAM(S): at 05:39

## 2021-06-03 RX ADMIN — PIPERACILLIN AND TAZOBACTAM 25 GRAM(S): 4; .5 INJECTION, POWDER, LYOPHILIZED, FOR SOLUTION INTRAVENOUS at 05:39

## 2021-06-03 NOTE — PROGRESS NOTE ADULT - TIME BILLING
I have personally seen and examined this patient.    I have reviewed all pertinent clinical information and reviewed all relevant imaging and diagnostic studies personally.   I counseled the patient about diagnostic testing and treatment plan. All questions were answered.   I discussed recommendations with the primary team.

## 2021-06-03 NOTE — PROGRESS NOTE ADULT - REASON FOR ADMISSION
right breast abscess

## 2021-06-03 NOTE — PROGRESS NOTE ADULT - PROVIDER SPECIALTY LIST ADULT
Infectious Disease
Infectious Disease
Surgery
Infectious Disease
Infectious Disease
Surgery
Surgery
Infectious Disease
Plastic Surgery
Surgery
Infectious Disease
Surgery

## 2021-06-03 NOTE — PROGRESS NOTE ADULT - NUTRITIONAL ASSESSMENT
This patient has been assessed with a concern for Malnutrition and has been determined to have a diagnosis/diagnoses of Severe protein-calorie malnutrition.    This patient is being managed with:   Diet Regular-  DASH/TLC {Sodium & Cholesterol Restricted}  Supplement Feeding Modality:  Oral  Ensure Enlive Cans or Servings Per Day:  1       Frequency:  Three Times a day  Entered: May 28 2021  2:07PM    
This patient has been assessed with a concern for Malnutrition and has been determined to have a diagnosis/diagnoses of Severe protein-calorie malnutrition.    This patient is being managed with:   Diet NPO after Midnight-     NPO Start Date: 26-May-2021   NPO Start Time: 23:59  Entered: May 26 2021 10:23AM    Diet Regular-  Arthur(7 Gm Arginine/7 Gm Glut/1.2 Gm HMB     Qty per Day:  3  Supplement Feeding Modality:  Oral  Ensure Enlive Cans or Servings Per Day:  3       Frequency:  Three Times a day  Entered: May 24 2021  5:51PM    
This patient has been assessed with a concern for Malnutrition and has been determined to have a diagnosis/diagnoses of Severe protein-calorie malnutrition.    This patient is being managed with:   Diet Regular-  DASH/TLC {Sodium & Cholesterol Restricted}  Supplement Feeding Modality:  Oral  Ensure Enlive Cans or Servings Per Day:  1       Frequency:  Three Times a day  Entered: May 28 2021  2:07PM    
This patient has been assessed with a concern for Malnutrition and has been determined to have a diagnosis/diagnoses of Severe protein-calorie malnutrition.    This patient is being managed with:   Diet Regular-  DASH/TLC {Sodium & Cholesterol Restricted}  Entered: May 27 2021  9:53PM    
This patient has been assessed with a concern for Malnutrition and has been determined to have a diagnosis/diagnoses of Severe protein-calorie malnutrition.    This patient is being managed with:   Diet Regular-  DASH/TLC {Sodium & Cholesterol Restricted}  Supplement Feeding Modality:  Oral  Ensure Enlive Cans or Servings Per Day:  1       Frequency:  Three Times a day  Entered: May 28 2021  2:07PM    
This patient has been assessed with a concern for Malnutrition and has been determined to have a diagnosis/diagnoses of Severe protein-calorie malnutrition.    This patient is being managed with:   Diet Regular-  DASH/TLC {Sodium & Cholesterol Restricted}  Supplement Feeding Modality:  Oral  Ensure Enlive Cans or Servings Per Day:  1       Frequency:  Three Times a day  Entered: May 28 2021  2:07PM    
This patient has been assessed with a concern for Malnutrition and has been determined to have a diagnosis/diagnoses of Severe protein-calorie malnutrition.    This patient is being managed with:   Diet NPO after Midnight-     NPO Start Date: 25-May-2021   NPO Start Time: 23:59  Entered: May 25 2021  8:54PM    Diet Regular-  Arthur(7 Gm Arginine/7 Gm Glut/1.2 Gm HMB     Qty per Day:  3  Supplement Feeding Modality:  Oral  Ensure Enlive Cans or Servings Per Day:  3       Frequency:  Three Times a day  Entered: May 24 2021  5:51PM    
This patient has been assessed with a concern for Malnutrition and has been determined to have a diagnosis/diagnoses of Severe protein-calorie malnutrition.    This patient is being managed with:   Diet NPO after Midnight-     NPO Start Date: 25-May-2021   NPO Start Time: 23:59  Entered: May 25 2021  8:54PM    Diet Regular-  Arthur(7 Gm Arginine/7 Gm Glut/1.2 Gm HMB     Qty per Day:  3  Supplement Feeding Modality:  Oral  Ensure Enlive Cans or Servings Per Day:  3       Frequency:  Three Times a day  Entered: May 24 2021  5:51PM    
This patient has been assessed with a concern for Malnutrition and has been determined to have a diagnosis/diagnoses of Severe protein-calorie malnutrition.    This patient is being managed with:   Diet Regular-  DASH/TLC {Sodium & Cholesterol Restricted}  Supplement Feeding Modality:  Oral  Ensure Enlive Cans or Servings Per Day:  1       Frequency:  Three Times a day  Entered: May 28 2021  2:07PM    

## 2021-06-03 NOTE — PROGRESS NOTE ADULT - SUBJECTIVE AND OBJECTIVE BOX
GENERAL SURGERY PROGRESS NOTE    Patient: CASSIDY NOE , 75y ([birth year])Female   MRN: [id number]  Location: [address]  Visit: 05-18-21 Inpatient  Date: 06-03-21 @ 02:02    Hospital Day #: 16d  Post-Op Day #:    Procedure/Dx/Injuries:     Events of past 24 hours: Patient seen and examined at bedside. No acute events overnight. Afebrile, VSS.    PAST MEDICAL & SURGICAL HISTORY:  Hypertension    Rheumatoid arthritis        Vitals:   T(F): 97.9 (06-02-21 @ 20:13), Max: 98.9 (06-02-21 @ 13:14)  HR: 81 (06-02-21 @ 20:13)  BP: 138/59 (06-02-21 @ 20:13)  RR: 18 (06-02-21 @ 20:13)  SpO2: 95% (06-02-21 @ 20:13)      Diet, Regular:   DASH/TLC Sodium & Cholesterol Restricted  Supplement Feeding Modality:  Oral  Ensure Enlive Cans or Servings Per Day:  1       Frequency:  Three Times a day      PHYSICAL EXAM:  General Appearance: NAD  HEENT: EOMI, sclera non-icteric.  Heart: RRR   Lungs: No increased work of breathing or accessory muscle use. Symmetric chest wall rise and fall.   Abdomen:  Soft, nontender, nondistended.   MSK/Extremities: Warm & well-perfused.   Skin: Warm, dry. No jaundice.   Incision/wound: healing well, dressings in place, clean, dry and intact right breast with wound vac in place       MEDICATIONS  (STANDING):  acetaminophen   Tablet .. 650 milliGRAM(s) Oral every 6 hours  chlorhexidine 4% Liquid 1 Application(s) Topical <User Schedule>  dronabinol 2.5 milliGRAM(s) Oral two times a day  gabapentin 300 milliGRAM(s) Oral three times a day  heparin   Injectable 5000 Unit(s) SubCutaneous every 8 hours  lisinopril 20 milliGRAM(s) Oral daily  pantoprazole    Tablet 40 milliGRAM(s) Oral before breakfast  piperacillin/tazobactam IVPB.. 3.375 Gram(s) IV Intermittent every 8 hours  polyethylene glycol 3350 17 Gram(s) Oral daily  senna 2 Tablet(s) Oral at bedtime    MEDICATIONS  (PRN):  oxyCODONE    IR 5 milliGRAM(s) Oral every 6 hours PRN Moderate Pain (4 - 6)  traZODone 50 milliGRAM(s) Oral at bedtime PRN insomnia/anxiety      DVT PROPHYLAXIS: heparin   Injectable 5000 Unit(s) SubCutaneous every 8 hours    GI PROPHYLAXIS: pantoprazole    Tablet 40 milliGRAM(s) Oral before breakfast    ANTICOAGULATION:   ANTIBIOTICS:  piperacillin/tazobactam IVPB.. 3.375 Gram(s)            Labs:  06-01    139  |  105  |  22<H>  ----------------------------<  105<H>  4.3   |  28  |  0.7      ACCESS/ DEVICES:  [x ] Peripheral IV  [x] wound vac right breast

## 2021-06-03 NOTE — PROGRESS NOTE ADULT - ASSESSMENT
ASSESSMENT  75F w/ PMH of HTN and b/l lumpectomies (10-15 years ago, reportedly benign pathology) presented to the ED with worsening R breast pain. Pt reports R breast biopsy last week Tuesday, 5/11. She was told to keep the dressing on for 5 days. Since the biopsy, she has been experiencing R breast pain. 2 days ago, she removed the dressing and saw erythema and small amount of necrotic tissue overlying R underside of breast. This morning the abscess ruptured and began putting out pus and necrotic tissue.    IMPRESSION  #Resolved, Severe Sepsis on admission lactic acidosis due to necrotic/necrotizing fasciitis R breast abscess after biopsy, Pathology with + AFB and filamentous forms    5/27 OR cx CoNS- likely contaminant -   Rare Coag Negative Staphylococcus Multiple Morphological Strains    Repeat OR AFB stain negative, CX pending     +  Actinomyces turicensis "Susceptibilities not performed"    5/24 WCX NG    5/24 BCX NGTD     5/22 WCX pending   s/p debridement 5/22 -- necrotizing soft tissue infection    s/p OR Exploration, wound, chest 20-May-2021 08:50:47 right breast Michelle Wagner  Incision and drainage, breast, with debridement 20-May-2021 08:51:08  Michelle Wagner  Irrigation and excisional debridement of musculofascial tissue 22-May-2021 09:04:56 right breast "AFB cultures sent, wound debrided to healthy tissue"    s/p OR WCX 5/20 --   Growth in fluid media only Coag Negative Staphylococcus- contaminant     Moderate Anaerobic Gram Negative Rocky Most closely resembling    Prevotella species "Susceptibilities not performed"  s/p Exploration, wound, chest 20-May-2021 08:50:47 right Michelle Wagner  Incision and drainage, breast, with debridement 20-May-2021 "extensive necrotizing soft tissue infection occupying more than 2/3rds of her breast, debrided to healthy tissue, packed with kerlex"    UCX   10,000 - 49,000 CFU/mL Escherichia coli (I unasyn, R bactrim)    s/p I&D exudate, purulent drainage, necrotic tissue    MRSA PCR Result.: Negative (05-19-21 @ 10:40)  #CoNS in bcx is a contaminant 1/4 bottles    5/18 1/4 bottles coNS  #Hyponatremia  Creatinine, Serum: 0.8 (05-19-21 @ 05:34)    Weight (kg): 63.3 (05-18-21 @ 16:27)    RECOMMENDATIONS  - f/u AFB culture and MODIFIED acid fast stain and culture for NOCARDIA stain; stain no AFB  - Actinomyces turicensis should NOT stain acid fast  - For actinomyces- Therapy often is needed for 6 months to a year- oral amoxicillin 500 mg three times daily on D/C for unknown duration 6-12mo   - Will need to f/u ID of AFB + organism and it is difficult to create an empiric regimen without known the species - may need to be decided as an outpatient   - f/u final WCX 5/20, 5/22  - continue zosyn 3.375 Gram(s) IV Intermittent every 8 hours  - Surgical Pathology Report:  Benign ulcerated/denuded skin and subcutaneous adipose tissue with coagulative necrosis, marked congestion, and acute  inflammation with abscess formation.- A mixed bacterial and mycobacterial colonization is present consisting of Gram (+) cocci in clusters, both Gram (+) and Gram  (-) bacilli seen singly and in filamentous forms, and scattered AFB (+) bacillus.(05.20.21 @ 08:22)  - No OR AFB sent initially  - Will discuss trying to perform PCR testing on AFB and sending to [facility] with Pathology if OR cx today NG  - ID follow-up with Dr. Keegan Rose Tuesday 6/15 for Telehealth. We will call the patient between 10:30-6:30      [address] [facility]       [phone]       Fax [phone]     Please call with any questions or send a message on [employer] Teams  Spectra [phone]

## 2021-06-03 NOTE — PROGRESS NOTE ADULT - ASSESSMENT
75y Female with a PMH of HTN and IBS with necrotic breast infection HD#14s/p multiple debridements. Wound vac last chjanged 5/30  Patient seen and examined at bedside. NAD.   Tolerating: Diet, Regular    ambulating +  voiding +  pain controlled with pain medications    PLAN:  - Monitor vitals  - Monitor labs and replete as necessary  - Monitor for bowel function  - Continue Pain Medications if necessary  - Continue Antibiotics  - Encourage ambulation as tolerated  - Monitor urine output  - DVT and GI Prophylaxis  - Contact social work/case management for wound vac and d/c today    Lines/Tubes: PIV, Midline, Central Line, A-Line, Chest tubes, Rolando/GELA drains, Degroot Catheter.    Date/Time: 06-03-21 @ 02:02

## 2021-06-03 NOTE — PROGRESS NOTE ADULT - NSICDXPILOT_GEN_ALL_CORE
[place]

## 2021-06-03 NOTE — PROGRESS NOTE ADULT - SUBJECTIVE AND OBJECTIVE BOX
CASSIDY NOE  75y, Female  Allergy: No Known Allergies      LOS  16d    CHIEF COMPLAINT: right breast abscess (03 Jun 2021 02:02)      INTERVAL EVENTS/HPI  - No acute events overnight  - T(F): , Max: 97.9 (06-02-21 @ 20:13)  - Denies any worsening symptoms  - Tolerating medication  - WBC Count: 7.91 (05-31-21 @ 20:32)     - Creatinine, Serum: 0.7 (06-01-21 @ 21:16)       ROS  General: Denies rigors, nightsweats  HEENT: Denies headache, rhinorrhea, sore throat, eye pain  CV: Denies CP, palpitations  PULM: Denies wheezing, hemoptysis  GI: Denies hematemesis, hematochezia, melena  : Denies discharge, hematuria  MSK: Denies arthralgias, myalgias  SKIN: Denies rash, lesions  NEURO: Denies paresthesias, weakness  PSYCH: Denies depression, anxiety    VITALS:  T(F): 97.9, Max: 97.9 (06-02-21 @ 20:13)  HR: 78  BP: 119/62  RR: 20Vital Signs Last 24 Hrs  T(C): 36.6 (03 Jun 2021 13:00), Max: 36.6 (02 Jun 2021 20:13)  T(F): 97.9 (03 Jun 2021 13:00), Max: 97.9 (02 Jun 2021 20:13)  HR: 78 (03 Jun 2021 13:00) (78 - 83)  BP: 119/62 (03 Jun 2021 13:00) (108/58 - 138/59)  BP(mean): --  RR: 20 (03 Jun 2021 13:00) (18 - 20)  SpO2: 98% (03 Jun 2021 08:12) (95% - 98%)    PHYSICAL EXAM:  Gen: NAD, resting in bed  HEENT: Normocephalic, atraumatic  Neck: supple, no lymphadenopathy  CV: Regular rate & regular rhythm  Lungs: decreased BS at bases, no fremitus  Abdomen: Soft, BS present  R breast vac dressings  Ext: Warm, well perfused  Neuro: non focal, awake  Skin: no rash, no erythema  Lines: no phlebitis    FH: Non-contributory  Social Hx: Non-contributory    TESTS & MEASUREMENTS:    06-01    139  |  105  |  22<H>  ----------------------------<  105<H>  4.3   |  28  |  0.7    Ca    8.0<L>      01 Jun 2021 21:16  Phos  3.2     06-01  Mg     1.7     06-01              Culture - Acid Fast - Other w/Smear (collected 05-27-21 @ 20:00)  Source: .Other RIGHT BREAST  Preliminary Report (05-29-21 @ 15:04):    Culture is being performed.    Culture - Surgical Swab (collected 05-27-21 @ 20:00)  Source: .Surgical Swab RIGHT BREAST  Final Report (06-02-21 @ 09:41):    No growth at 5 days    Culture - Acid Fast - Other w/Smear (collected 05-27-21 @ 20:00)  Source: .Other RIGHT BREAST  Preliminary Report (05-29-21 @ 15:04):    Culture is being performed.    Culture - Surgical Swab (collected 05-27-21 @ 20:00)  Source: .Surgical Swab right breast  Final Report (06-02-21 @ 10:10):    Rare Coag Negative Staphylococcus Multiple Morphological Strains  Organism: Coag Negative Staphylococcus (06-02-21 @ 10:10)  Organism: Coag Negative Staphylococcus (06-02-21 @ 10:10)      -  Ampicillin/Sulbactam: R <=8/4      -  Cefazolin: R <=4      -  Clindamycin: R >4      -  Erythromycin: R >4      -  Gentamicin: S <=1 Should not be used as monotherapy      -  Oxacillin: R >2      -  Penicillin: R 8      -  RIF- Rifampin: S <=1 Should not be used as monotherapy      -  Tetra/Doxy: S <=1      -  Trimethoprim/Sulfamethoxazole: S <=0.5/9.5      -  Vancomycin: S 2      Method Type: RA    Culture - Acid Fast - Tissue w/Smear (collected 05-27-21 @ 20:00)  Source: .Tissue RT BREAST TISSUE  Preliminary Report (05-29-21 @ 15:04):    Culture is being performed.    Culture - Tissue with Gram Stain (collected 05-27-21 @ 20:00)  Source: .Tissue RIGHT BREAST TISSUE  Gram Stain (05-28-21 @ 13:43):    No polymorphonuclear leukocytes per low power field    No organisms seen per oil power field  Final Report (06-02-21 @ 09:45):    No growth at 5 days    Culture - Tissue with Gram Stain (collected 05-24-21 @ 23:00)  Source: .Tissue None  Gram Stain (05-25-21 @ 11:48):    Rare polymorphonuclear leukocytes per low power field    No organisms seen per oil power field  Final Report (05-30-21 @ 08:44):    No growth at 5 days    Culture - Blood (collected 05-24-21 @ 21:30)  Source: .Blood None  Final Report (05-30-21 @ 07:00):    No Growth Final    Culture - Tissue with Gram Stain (collected 05-22-21 @ 14:00)  Source: .Tissue None  Gram Stain (05-23-21 @ 02:35):    Few polymorphonuclear leukocytes seen per low power field    Few Gram positive cocci in pairs seen per oil power field    Rare Gram Variable Rods seen per oil power field  Final Report (05-29-21 @ 18:24):    Few Actinomyces turicensis "Susceptibilities not performed"    Few Most closely resembling [place] species "Susceptibilities not    performed"    Please note this organism may appear as gram positive cocci in pairs in    direct smears of clinical specimens    Culture - Tissue with Gram Stain (collected 05-20-21 @ 10:44)  Source: .Tissue None  Gram Stain (05-20-21 @ 21:54):    Rare polymorphonuclear leukocytes seen per low power field    Rare Gram Variable Rods seen per oil power field    Rare Gram positive cocci in pairs seen per oil power field  Final Report (05-23-21 @ 15:38):    Growth in fluid media only Coag Negative Staphylococcus    Moderate Anaerobic Gram Negative Rocky Most closely resembling    Prevotella species "Susceptibilities not performed"  Organism: Coag Negative Staphylococcus (05-23-21 @ 15:38)  Organism: Coag Negative Staphylococcus (05-23-21 @ 15:38)      -  Ampicillin/Sulbactam: R <=8/4      -  Cefazolin: R <=4      -  Clindamycin: R >4      -  Erythromycin: R >4      -  Gentamicin: S <=1 Should not be used as monotherapy      -  Oxacillin: R >2      -  Penicillin: R >8      -  RIF- Rifampin: S <=1 Should not be used as monotherapy      -  Tetra/Doxy: S <=1      -  Trimethoprim/Sulfamethoxazole: S <=0.5/9.5      -  Vancomycin: S 2      Method Type: RA    Culture - Urine (collected 05-18-21 @ 13:17)  Source: .Urine Clean Catch (Midstream)  Final Report (05-22-21 @ 09:44):    10,000 - 49,000 CFU/mL Escherichia coli    <10,000 CFU/ml Normal Urogenital johanna present  Organism: Escherichia coli (05-22-21 @ 09:44)  Organism: Escherichia coli (05-22-21 @ 09:44)      -  Amikacin: S <=16      -  Amoxicillin/Clavulanic Acid: S <=8/4      -  Ampicillin: R >16 These ampicillin results predict results for amoxicillin      -  Ampicillin/Sulbactam: I 16/8 Enterobacter, Citrobacter, and Serratia may develop resistance during prolonged therapy (3-4 days)      -  Aztreonam: S <=4      -  Cefazolin: S <=2 (MIC_CL_COM_ENTERIC_CEFAZU) For uncomplicated UTI with K. pneumoniae, E. coli, or P. mirablis: RA <=16 is sensitive and RA >=32 is resistant. This also predicts results for oral agents cefaclor, cefdinir, cefpodoxime, cefprozil, cefuroxime axetil, cephalexin and locarbef for uncomplicated UTI. Note that some isolates may be susceptible to these agents while testing resistant to cefazolin.      -  Cefepime: S <=2      -  Cefoxitin: S <=8      -  Ceftriaxone: S <=1 Enterobacter, Citrobacter, and Serratia may develop resistance during prolonged therapy      -  Ciprofloxacin: S <=0.25      -  Ertapenem: S <=0.5      -  Gentamicin: S <=2      -  Imipenem: S <=1      -  Levofloxacin: S <=0.5      -  Meropenem: S <=1      -  Nitrofurantoin: S <=32 Should not be used to treat pyelonephritis      -  Piperacillin/Tazobactam: S <=8      -  Tigecycline: S <=2      -  Tobramycin: S <=2      -  Trimethoprim/Sulfamethoxazole: R >2/38      Method Type: RA    Culture - Blood (collected 05-18-21 @ 11:00)  Source: .Blood Blood-Peripheral  Gram Stain (05-20-21 @ 21:44):    Growth in anaerobic bottle: Gram Positive Cocci in Clusters  Final Report (05-21-21 @ 18:02):    Growth in anaerobic bottle: Staphylococcus cohnii    Coag Negative Staphylococcus    Single set isolate, possible contaminant. Contact    Microbiology if susceptibility testing clinically    indicated.    ***Blood Panel PCR results on this specimen are available    approximately 3 hours after the Gram stain result.***    Gram stain, PCR, and/or culture results may not always    correspond due to difference in methodologies.    ************************************************************    This PCR assay was performed by multiplex PCR. This    Assay tests for 66 bacterial and resistance gene targets.    Please refer to the [facility] [employer] test directory    at [email or web address] for details.  Organism: Blood Culture PCR (05-21-21 @ 18:02)  Organism: Blood Culture PCR (05-21-21 @ 18:02)      -  Coagulase negative Staphylococcus: Detec      Method Type: PCR    Culture - Blood (collected 05-18-21 @ 11:00)  Source: .Blood Blood-Peripheral  Final Report (05-23-21 @ 23:00):    No Growth Final            INFECTIOUS DISEASES TESTING  COVID-19 PCR: NotDetec (05-26-21 @ 15:26)  COVID-19 PCR: NotDetec (05-23-21 @ 21:33)  COVID-19 PCR: NotDetec (05-21-21 @ 23:12)  MRSA PCR Result.: Negative (05-19-21 @ 10:40)  COVID-19 PCR: NotDetec (05-18-21 @ 10:14)      INFLAMMATORY MARKERS  Sedimentation Rate, Erythrocyte: 85 mm/Hr (05-21-21 @ 20:40)  C-Reactive Protein, Serum: 80 mg/L (05-21-21 @ 20:40)      RADIOLOGY & ADDITIONAL TESTS:  I have personally reviewed the last available Chest xray  CXR      CT      CARDIOLOGY TESTING      MEDICATIONS  acetaminophen   Tablet .. 650 Oral every 6 hours  chlorhexidine 4% Liquid 1 Topical <User Schedule>  dronabinol 2.5 Oral two times a day  gabapentin 300 Oral three times a day  heparin   Injectable 5000 SubCutaneous every 8 hours  lisinopril 20 Oral daily  pantoprazole    Tablet 40 Oral before breakfast  piperacillin/tazobactam IVPB.. 3.375 IV Intermittent every 8 hours  polyethylene glycol 3350 17 Oral daily  senna 2 Oral at bedtime      WEIGHT  Weight (kg): 65.1 (05-27-21 @ 19:37)      ANTIBIOTICS:  piperacillin/tazobactam IVPB.. 3.375 Gram(s) IV Intermittent every 8 hours      All available historical records have been reviewed

## 2021-06-04 PROBLEM — M06.9 RHEUMATOID ARTHRITIS, UNSPECIFIED: Chronic | Status: ACTIVE | Noted: 2021-05-18

## 2021-06-04 PROBLEM — I10 ESSENTIAL (PRIMARY) HYPERTENSION: Chronic | Status: ACTIVE | Noted: 2021-05-18

## 2021-06-04 LAB — SURGICAL PATHOLOGY STUDY: SIGNIFICANT CHANGE UP

## 2021-06-07 DIAGNOSIS — M72.6 NECROTIZING FASCIITIS: ICD-10-CM

## 2021-06-07 DIAGNOSIS — T81.41XA INFECTION FOLLOWING A PROCEDURE, SUPERFICIAL INCISIONAL SURGICAL SITE, INITIAL ENCOUNTER: ICD-10-CM

## 2021-06-07 DIAGNOSIS — Z71.3 DIETARY COUNSELING AND SURVEILLANCE: ICD-10-CM

## 2021-06-07 DIAGNOSIS — N64.1 FAT NECROSIS OF BREAST: ICD-10-CM

## 2021-06-07 DIAGNOSIS — I10 ESSENTIAL (PRIMARY) HYPERTENSION: ICD-10-CM

## 2021-06-07 DIAGNOSIS — R26.89 OTHER ABNORMALITIES OF GAIT AND MOBILITY: ICD-10-CM
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
## 2021-06-07 DIAGNOSIS — E43 UNSPECIFIED SEVERE PROTEIN-CALORIE MALNUTRITION: ICD-10-CM

## 2021-06-07 DIAGNOSIS — R63.4 ABNORMAL WEIGHT LOSS: ICD-10-CM

## 2021-06-07 DIAGNOSIS — A41.89 OTHER SPECIFIED SEPSIS: ICD-10-CM

## 2021-06-07 DIAGNOSIS — T81.44XA SEPSIS FOLLOWING A PROCEDURE, INITIAL ENCOUNTER: ICD-10-CM

## 2021-06-07 DIAGNOSIS — N61.1 ABSCESS OF THE BREAST AND NIPPLE: ICD-10-CM

## 2021-06-07 DIAGNOSIS — E87.1 HYPO-OSMOLALITY AND HYPONATREMIA: ICD-10-CM
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
## 2021-06-07 DIAGNOSIS — J98.11 ATELECTASIS: ICD-10-CM

## 2021-06-07 DIAGNOSIS — K44.9 DIAPHRAGMATIC HERNIA WITHOUT OBSTRUCTION OR GANGRENE: ICD-10-CM

## 2021-06-07 DIAGNOSIS — R91.1 SOLITARY PULMONARY NODULE: ICD-10-CM

## 2021-06-07 DIAGNOSIS — E87.6 HYPOKALEMIA: ICD-10-CM

## 2021-06-07 DIAGNOSIS — R65.20 SEVERE SEPSIS WITHOUT SEPTIC SHOCK: ICD-10-CM

## 2021-06-07 DIAGNOSIS — M06.9 RHEUMATOID ARTHRITIS, UNSPECIFIED: ICD-10-CM

## 2021-06-07 DIAGNOSIS — Z87.891 PERSONAL HISTORY OF NICOTINE DEPENDENCE: ICD-10-CM

## 2021-06-07 DIAGNOSIS — A41.9 SEPSIS, UNSPECIFIED ORGANISM: ICD-10-CM

## 2021-06-07 DIAGNOSIS — E87.2 ACIDOSIS: ICD-10-CM

## 2021-06-07 DIAGNOSIS — N60.01 SOLITARY CYST OF RIGHT BREAST: ICD-10-CM

## 2021-06-07 PROBLEM — Z00.00 ENCOUNTER FOR PREVENTIVE HEALTH EXAMINATION: Status: ACTIVE | Noted: 2021-06-07

## 2021-06-07 RX ORDER — OXYCODONE 5 MG/1
5 TABLET ORAL
Qty: 24 | Refills: 0 | Status: ACTIVE | COMMUNITY
Start: 2021-06-07 | End: 1900-01-01

## 2021-06-10 ENCOUNTER — APPOINTMENT (OUTPATIENT)
Dept: BREAST CENTER | Facility: CLINIC | Age: 76
End: 2021-06-10
Payer: MEDICARE

## 2021-06-10 VITALS
DIASTOLIC BLOOD PRESSURE: 80 MMHG | WEIGHT: 142 LBS | HEIGHT: 58 IN | SYSTOLIC BLOOD PRESSURE: 142 MMHG | BODY MASS INDEX: 29.81 KG/M2

## 2021-06-10 DIAGNOSIS — Z78.9 OTHER SPECIFIED HEALTH STATUS: ICD-10-CM

## 2021-06-10 DIAGNOSIS — Z86.2 PERSONAL HISTORY OF DISEASES OF THE BLOOD AND BLOOD-FORMING ORGANS AND CERTAIN DISORDERS INVOLVING THE IMMUNE MECHANISM: ICD-10-CM

## 2021-06-10 PROCEDURE — 99203 OFFICE O/P NEW LOW 30 MIN: CPT

## 2021-06-11 ENCOUNTER — APPOINTMENT (OUTPATIENT)
Dept: PLASTIC SURGERY | Facility: CLINIC | Age: 76
End: 2021-06-11

## 2021-06-12 PROBLEM — Z78.9 NO HISTORY OF ALCOHOL USE: Status: ACTIVE | Noted: 2021-06-12

## 2021-06-12 PROBLEM — Z86.2 HISTORY OF ANEMIA: Status: RESOLVED | Noted: 2021-06-12 | Resolved: 2021-06-12

## 2021-06-12 NOTE — ASSESSMENT
[FreeTextEntry1] : Elida is a 75 F with a necrotizing right breast soft tissue infection, s/p multiple debridements, now with a wound vac in place. \par \par ON exam, her right breast wound is located in her inferior breast, measures about 12 x 8 cm and is about 4 cm deep; wound vac was reapplied today.  THere was no signs of worsening infection at this time.  \par \par I will have her follow up in 2 weeks for a wound check. \par She should continue with q 48-72 hr wound vac change.\par She will follow up with Dr. Fishman from infectious disease, for further recommendations on her antibiotics regimen.  She is currently still taking the Augmentin. \par \par All of her questions were answered.  She knows to call back with any further questions or concerns. \par \par PLAN: \par -f/up in 2 weeks for a wound check

## 2021-06-12 NOTE — DATA REVIEWED
[FreeTextEntry1] : Procedure(s) \par Accession Number(s)\par Date of Service\par MAMMO DIAGNOSTIC BILATERAL\par US BREAST\par [id number]\par [id number]\par 4/30/21\par 4/30/21\par  \par  \par  \par     \par OVERALL IMPRESSION: \par  \par There is no mammographic or sonographic evidence of malignancy in the left breast.\par  \par Palpable lump in the left axilla corresponding to a sebaceous cyst. Clinical follow-up is recommended.\par  \par Indeterminant mass in the retroareolar right breast corresponding to the mammographic abnormality for which ultrasound core biopsy is recommended.\par  \par Biopsy of the right breast(s) is recommended. A letter will be sent to the patient to return for a biopsy.\par  \par The findings and recommendations were discussed with the patient.\par  \par BI-RADS 4: SUSPICIOUS\par  \par  \par FINDINGS:\par  \par MAMMO TOMOSYNTHESIS DIAGNOSTIC BILATERAL, US BREAST COMPLETE BILATERAL\par  \par Clinical Breast Exam: Patient does report clinical breast exam in the last year.\par  \par Clinical Indication: No family history of breast cancer. Patient has left axillary lump. Patient has left axillary pain.\par  \par Comparison: None available\par  \par MAMMOGRAM: \par  \par Tomosynthesis and 2D imaging of the breast(s) were performed.  Current study was also evaluated with a computer aided detection (CAD) system.\par  \par Breast Density: There are scattered areas of fibroglandular density.\par  \par No significant masses, calcifications, or other findings are seen in the left breast. A triangular BB is seen in the left axilla corresponding to the area of palpable lump without underlying suspicious abnormality.\par In the retroareolar right breast there is a focal asymmetry which partially persists and additional imaging.\par  \par US BREAST COMPLETE BILATERAL\par  \par Ultrasound evaluation was performed including examination of all four quadrants of the breast(s) and the retroareolar regions.\par  \par Color flow, Gray scale and real-time ultrasound of both breasts was performed. \par  \par No suspicious abnormalities were seen sonographically in the left breast. In the left axilla corresponding to the area of palpable lump there is a hypoechoic subcutaneous mass measuring 1.0 x 0.5 x 0.9 cm consistent with a sebaceous cyst.\par In the retroareolar right breast there is a isoechoic mass measuring 0.8 x 0.5 x 0.7 cm may correspond to the mammographic abnormality. Ultrasound core biopsy is recommended for further evaluation.\par  \par Electronic Signature: I personally reviewed the images and agree with this report. Final Report: Dictated by  and Signed by Attending Marisol Hernandez MD 4/30/2021 1:39 PM\par \par Procedure(s) \par Accession Number(s)\par Date of Service\par US BREAST CORE BIOPSY\par MAMMO DIGITAL POST PROCEDURE RIGHT\par [id number]\par [id number]\par 5/11/21\par 5/11/21\par  \par Pathology Report Received From [facility]:\par  \par The pathology report of the right breast ultrasound biopsy dated 5/11/2021 indicated that the target at retroareolar region is BENIGN \par  \par A. BREAST, RIGHT, RETROAREOLAR, ULTRASOUND (US) GUIDED BIOPSY: \par  \par -  CONSISTENT WITH RUPTURED EPIDERMAL INCLUSION CYST. \par  \par COMMENT: E-CADHERIN AND [id number] PERFORMED ON BLOCKA1 HIGHLIGHT UNREMARKABLE BREAST PARENCHYMA AND ECCRINE UNITS. \par  \par The pathology results are concordant with the imaging findings. Follow-up ultrasound in 6 months is recommended.\par  \par The benign results will be relayed to the patient by the mammography coordinator.\par  \par As further detailed above, a 6 month follow-up DIAGNOSTIC imaging exam of the right breast(s) is recommended. _\par  \par Electronic Signature: I personally reviewed the images and agree with this report. Final Report: Dictated by  and Signed by Attending Marisol Hernandez MD 5/14/2021 10:38 AM\par  \par Addended by: Marisol Hernandez MD on 5/14/2021 10:38 AM\par  \par  \par     \par IMPRESSION: \par  \par Ultrasound guided needle biopsy of the right breast. Pathology pending.\par  \par A final report will be issued when Pathology results are available. _\par  \par  \par FINDINGS:\par  \par US BREAST CORE BIOPSY RIGHT, MAMMO DIGITAL POST PROCEDURE RIGHT\par  \par HISTORY: Ultrasound of the right breast dated 4/30/2021 revealed a mass at retroareolar region which was recommended for biopsy.\par  \par Benefits, risks and alternatives to the procedure were explained to the patient and informed written consent was obtained. \par  \par The patient denied taking aspirin or anticoagulants and stated no allergies to topical antiseptic solutions or local anesthetic. \par  \par Utilizing universal protocol, site and patient verification was performed prior to starting the procedure.\par  \par PROCEDURE: After sterile skin preparation, local anesthesia was achieved with 1% lidocaine. Under ultrasound guidance, a 12G vacuum assisted needle biopsy device was used to obtain multiple core specimens. \par  \par After the procedure, a cork marking clip was deployed in the area of sampling. \par  \par The patient tolerated the procedure well without immediate complications. \par  \par POST PROCEDURE MAMMOGRAM FOR MARKER PLACEMENT\par  \par A post-procedure mammogram was performed and demonstrates a clip in the appropriate location. \par  \par Electronic Signature: I personally reviewed the images and agree with this report. Final Report: Dictated by  and Signed by Attending Marisol Hernandez MD 5/11/2021 10:57 AM\par

## 2021-06-12 NOTE — PAST MEDICAL HISTORY
[Menopause Age____] : age at menopause was [unfilled] [History of Hormone Replacement Treatment] : has no history of hormone replacement treatment [Total Preg ___] : G[unfilled] [Live Births ___] : P[unfilled]  [Age At Live Birth ___] : Age at live birth: [unfilled] [FreeTextEntry5] : basil/bso [FreeTextEntry6] : denies [FreeTextEntry7] : denies [FreeTextEntry8] : denies

## 2021-06-12 NOTE — PHYSICAL EXAM
[Normocephalic] : normocephalic [Atraumatic] : atraumatic [EOMI] : extra ocular movement intact [No Supraclavicular Adenopathy] : no supraclavicular adenopathy [No Cervical Adenopathy] : no cervical adenopathy [de-identified] : her right breast wound is located in her inferior breast, measures about 12 x 8 cm and is about 4 cm deep; wound vac was reapplied today

## 2021-06-12 NOTE — REVIEW OF SYSTEMS
[Fever] : no fever [Chills] : no chills [As Noted in HPI] : as noted in HPI [Skin Wound] : skin wound [Breast Pain] : breast pain [Negative] : Heme/Lymph

## 2021-06-12 NOTE — HISTORY OF PRESENT ILLNESS
[FreeTextEntry1] : Elida is a 75 F who presents with a R breast necrotizing soft tissue infection. \par \par She had a b/l dx mammogram and US on 4/30/2021 which revealed in her left axilla, a sebaceous cyst measuring 10 x 5 x 9 mm and a indeterminate isoechoic mass in her right retroareolar breast, measuring 8 x 5 x 7 mm, which a biopsy was recommended for. \par \par She had a R US guided biopsy on 5/11/2021 which revealed a ruptured epidermal inclusion cyst. \par After her biopsy, she started to develop worsening pain in her right breast, but was apparently was told by [facility] not to remove the outer dressing and was told that this is most likely related to her biopsy.  HOwever, she presented to the ED on 5/18/2021 with foul smelling discharge, and a partially necrotic breast associated with mastitis and erythema.  At that time, she had a WBC of 16, was tachycardic, but remained afebrile.  \par \par SHe underwent a bedside I and D on 5/18/2021 which revealed purulent drainage and necrotic breast and skin tissue.  \par \par SHe underwent an operative excisional debridement of her right breast infection on 5/20/2021 which revealed that over half of her breast tissue was involved in this necrotic tissue.  THis was debrided to healthy tissue and pulse irrigated with an antibiotic solution.  Of note, the infection appeared to be travelling in between her skin and breast tissue. \par \par SHe had another debridement of her right breast performed on 5/22/2021 and on 5/24/2021 at which time her infection did not appear to be worsening, so a wound vac was applied.  HOwever, she was evaluated by the plastic surgical team, Dr. Montana and the recommendation was to debride even more of her tissue as there was some parts of her skin that did not appear viable. \par \par She was taken back to the OR on 5/27/2021 with a more aggressive debridement performed, with only about 1/3-1/2 of her breast tissue remaining.  This was again irrigated with pulse irrigation and a wound vac was applied.  After this fourth debridement, her WBC continued to trend down, and normalized two days later.  She was discharged after her vitals normalized on 6/3/2021 with VNS for wound vac changes and sent home with a long term course of Augmentin, with recommendations to follow up with Dr. Fishman from infectious disease for further antibiotics recommendations.  \par \par SHe presents today with her daughter for a wound check.  She has not had any issues with her breast since her discharge, and denies any fevers or chills.  She only reports pain with the dressing changes and she is continuing with the Augmentin antibiotics. \par \par She has not yet met with Dr. Fishman for further antibiotics instructions.

## 2021-06-15 ENCOUNTER — APPOINTMENT (OUTPATIENT)
Age: 76
End: 2021-06-15
Payer: MEDICARE

## 2021-06-15 VITALS
HEIGHT: 58 IN | HEART RATE: 87 BPM | SYSTOLIC BLOOD PRESSURE: 124 MMHG | OXYGEN SATURATION: 95 % | RESPIRATION RATE: 12 BRPM | WEIGHT: 142 LBS | BODY MASS INDEX: 29.81 KG/M2 | DIASTOLIC BLOOD PRESSURE: 74 MMHG

## 2021-06-15 DIAGNOSIS — Z87.891 PERSONAL HISTORY OF NICOTINE DEPENDENCE: ICD-10-CM

## 2021-06-15 DIAGNOSIS — Z86.79 PERSONAL HISTORY OF OTHER DISEASES OF THE CIRCULATORY SYSTEM: ICD-10-CM

## 2021-06-15 PROCEDURE — 99203 OFFICE O/P NEW LOW 30 MIN: CPT

## 2021-06-15 NOTE — REASON FOR VISIT
[Initial] : an initial visit [Pulmonary Nodules] : pulmonary nodules [Family Member] : family member

## 2021-06-15 NOTE — PHYSICAL EXAM
[No Acute Distress] : no acute distress [Normal Oropharynx] : normal oropharynx [Normal Appearance] : normal appearance [No Neck Mass] : no neck mass [Normal Rate/Rhythm] : normal rate/rhythm [Normal S1, S2] : normal s1, s2 [No Murmurs] : no murmurs [No Resp Distress] : no resp distress [Clear to Auscultation Bilaterally] : clear to auscultation bilaterally [No Abnormalities] : no abnormalities [Benign] : benign [No Clubbing] : no clubbing [No Cyanosis] : no cyanosis [No Edema] : no edema

## 2021-06-28 ENCOUNTER — APPOINTMENT (OUTPATIENT)
Dept: BREAST CENTER | Facility: CLINIC | Age: 76
End: 2021-06-28
Payer: MEDICARE

## 2021-06-28 VITALS
TEMPERATURE: 98 F | WEIGHT: 142 LBS | BODY MASS INDEX: 29.81 KG/M2 | SYSTOLIC BLOOD PRESSURE: 122 MMHG | DIASTOLIC BLOOD PRESSURE: 78 MMHG | HEIGHT: 58 IN

## 2021-06-28 PROCEDURE — 99212 OFFICE O/P EST SF 10 MIN: CPT

## 2021-06-28 NOTE — PHYSICAL EXAM
[Normocephalic] : normocephalic [Atraumatic] : atraumatic [EOMI] : extra ocular movement intact [No Supraclavicular Adenopathy] : no supraclavicular adenopathy [No Cervical Adenopathy] : no cervical adenopathy [de-identified] : her right breast wound is located in her inferior breast, measures about 8 x 4 cm and is about 2 cm deep; wet to dry dressing applied today

## 2021-06-28 NOTE — PAST MEDICAL HISTORY
[Menopause Age____] : age at menopause was [unfilled] [Total Preg ___] : G[unfilled] [Live Births ___] : P[unfilled]  [Age At Live Birth ___] : Age at live birth: [unfilled] [History of Hormone Replacement Treatment] : has no history of hormone replacement treatment [FreeTextEntry5] : basil/bso [FreeTextEntry6] : denies [FreeTextEntry7] : denies [FreeTextEntry8] : denies

## 2021-06-28 NOTE — HISTORY OF PRESENT ILLNESS
[FreeTextEntry1] : Elida is a 75 F who presents with a R breast necrotizing soft tissue infection. \par \par She had a b/l dx mammogram and US on 4/30/2021 which revealed in her left axilla, a sebaceous cyst measuring 10 x 5 x 9 mm and a indeterminate isoechoic mass in her right retroareolar breast, measuring 8 x 5 x 7 mm, which a biopsy was recommended for. \par \par She had a R US guided biopsy on 5/11/2021 which revealed a ruptured epidermal inclusion cyst. \par After her biopsy, she started to develop worsening pain in her right breast, but was apparently was told by [facility] not to remove the outer dressing and was told that this is most likely related to her biopsy.  HOwever, she presented to the ED on 5/18/2021 with foul smelling discharge, and a partially necrotic breast associated with mastitis and erythema.  At that time, she had a WBC of 16, was tachycardic, but remained afebrile.  \par \par SHe underwent a bedside I and D on 5/18/2021 which revealed purulent drainage and necrotic breast and skin tissue.  \par \par SHe underwent an operative excisional debridement of her right breast infection on 5/20/2021 which revealed that over half of her breast tissue was involved in this necrotic tissue.  THis was debrided to healthy tissue and pulse irrigated with an antibiotic solution.  Of note, the infection appeared to be travelling in between her skin and breast tissue. \par \par SHe had another debridement of her right breast performed on 5/22/2021 and on 5/24/2021 at which time her infection did not appear to be worsening, so a wound vac was applied.  HOwever, she was evaluated by the plastic surgical team, Dr. Montana and the recommendation was to debride even more of her tissue as there was some parts of her skin that did not appear viable. \par \par She was taken back to the OR on 5/27/2021 with a more aggressive debridement performed, with only about 1/3-1/2 of her breast tissue remaining.  This was again irrigated with pulse irrigation and a wound vac was applied.  After this fourth debridement, her WBC continued to trend down, and normalized two days later.  She was discharged after her vitals normalized on 6/3/2021 with VNS for wound vac changes and sent home with a long term course of Augmentin, with recommendations to follow up with Dr. Fishman from infectious disease for further antibiotics recommendations.  \par \par SHe presents today with her daughter for a wound check.  She has not had any issues with her breast since her discharge, and denies any fevers or chills.  She only reports pain with the dressing changes and she is continuing with the Augmentin antibiotics. \par \par She has not yet met with Dr. Fishman for further antibiotics instructions. \par \par INTERVAL HISTORY: \americo Marrero returns for a 3 week follow up for a right breast necrotizing soft tissue infection. \par She has been doing well since her last visit.  She is continuing with the wound vac but her wound is now 8 x 4 x 2 cm in size. She denies any fevers or chills and is continuing to take augmentin. She has not yet met with Dr. Fishman from infectious disease.

## 2021-06-28 NOTE — REVIEW OF SYSTEMS
[As Noted in HPI] : as noted in HPI [Skin Wound] : skin wound [Breast Pain] : breast pain [Negative] : Heme/Lymph [Fever] : no fever [Chills] : no chills [Itching] : itching

## 2021-06-28 NOTE — ASSESSMENT
[FreeTextEntry1] : Elida is a 75 F with a necrotizing right breast soft tissue infection, s/p multiple debridements, now with a wound vac in place. \par \par ON exam, her right breast wound is located in her inferior breast, measures about 8 x 4 cm and is about 2 cm deep; wet to dry dressing applied.  THere was no signs of worsening infection at this time.  \par \par I will have her follow up in 2 weeks for a wound check. \par She should continue with daily wet to dry dressing changes with dry gauze on top. \par She will follow up with Dr. Fishman from infectious disease, for further recommendations on her antibiotics regimen.  She is currently still taking the Augmentin. \par \par All of her questions were answered.  She knows to call back with any further questions or concerns. \par \par PLAN: \par -f/up in 2 weeks for a wound check

## 2021-07-14 ENCOUNTER — APPOINTMENT (OUTPATIENT)
Dept: BREAST CENTER | Facility: CLINIC | Age: 76
End: 2021-07-14
Payer: MEDICARE

## 2021-07-14 VITALS
HEIGHT: 58 IN | BODY MASS INDEX: 29.81 KG/M2 | SYSTOLIC BLOOD PRESSURE: 136 MMHG | TEMPERATURE: 98 F | WEIGHT: 142 LBS | DIASTOLIC BLOOD PRESSURE: 80 MMHG

## 2021-07-14 PROCEDURE — 99212 OFFICE O/P EST SF 10 MIN: CPT

## 2021-07-15 NOTE — PHYSICAL EXAM
[Normocephalic] : normocephalic [Atraumatic] : atraumatic [EOMI] : extra ocular movement intact [No Supraclavicular Adenopathy] : no supraclavicular adenopathy [No Cervical Adenopathy] : no cervical adenopathy [de-identified] : her right breast wound is located in her inferior breast, measures about 8 x 1 cm and is about 1 cm deep; wet to dry dressing applied today

## 2021-07-15 NOTE — HISTORY OF PRESENT ILLNESS
[FreeTextEntry1] : Elida is a 75 F who presents with a R breast necrotizing soft tissue infection. \par \par She had a b/l dx mammogram and US on 4/30/2021 which revealed in her left axilla, a sebaceous cyst measuring 10 x 5 x 9 mm and a indeterminate isoechoic mass in her right retroareolar breast, measuring 8 x 5 x 7 mm, which a biopsy was recommended for. \par \par She had a R US guided biopsy on 5/11/2021 which revealed a ruptured epidermal inclusion cyst. \par After her biopsy, she started to develop worsening pain in her right breast, but was apparently was told by [facility] not to remove the outer dressing and was told that this is most likely related to her biopsy.  HOwever, she presented to the ED on 5/18/2021 with foul smelling discharge, and a partially necrotic breast associated with mastitis and erythema.  At that time, she had a WBC of 16, was tachycardic, but remained afebrile.  \par \par SHe underwent a bedside I and D on 5/18/2021 which revealed purulent drainage and necrotic breast and skin tissue.  \par \par SHe underwent an operative excisional debridement of her right breast infection on 5/20/2021 which revealed that over half of her breast tissue was involved in this necrotic tissue.  THis was debrided to healthy tissue and pulse irrigated with an antibiotic solution.  Of note, the infection appeared to be travelling in between her skin and breast tissue. \par \par SHe had another debridement of her right breast performed on 5/22/2021 and on 5/24/2021 at which time her infection did not appear to be worsening, so a wound vac was applied.  HOwever, she was evaluated by the plastic surgical team, Dr. Montana and the recommendation was to debride even more of her tissue as there was some parts of her skin that did not appear viable. \par \par She was taken back to the OR on 5/27/2021 with a more aggressive debridement performed, with only about 1/3-1/2 of her breast tissue remaining.  This was again irrigated with pulse irrigation and a wound vac was applied.  After this fourth debridement, her WBC continued to trend down, and normalized two days later.  She was discharged after her vitals normalized on 6/3/2021 with VNS for wound vac changes and sent home with a long term course of Augmentin, with recommendations to follow up with Dr. Fishman from infectious disease for further antibiotics recommendations.  \par \par SHe presents today with her daughter for a wound check.  She has not had any issues with her breast since her discharge, and denies any fevers or chills.  She only reports pain with the dressing changes and she is continuing with the Augmentin antibiotics. \par \par She has not yet met with Dr. Fishman for further antibiotics instructions. \par \par INTERVAL HISTORY: \americo Marrero returns for a 3 week follow up for a right breast necrotizing soft tissue infection. \par She has been doing well since her last visit.  She is continuing with the wound vac but her wound is now 8 x 4 x 2 cm in size. She denies any fevers or chills and is continuing to take augmentin. She has not yet met with Dr. Fishman from infectious disease.  \par \par 7/14/2021\americo Marrero returns for a 3 week follow up for a R breast necrotizing soft tissue infection. \par She has no new breast related complaints.  She is getting dressing changes every other day without any issues.  She denies any drainage, fevers or chills and is continuing on the antibiotics.

## 2021-07-15 NOTE — REVIEW OF SYSTEMS
[As Noted in HPI] : as noted in HPI [Skin Wound] : skin wound [Itching] : itching [Negative] : Heme/Lymph [Fever] : no fever [Chills] : no chills [Breast Pain] : no breast pain

## 2021-07-15 NOTE — ASSESSMENT
[FreeTextEntry1] : Elida is a 75 F with a necrotizing right breast soft tissue infection, s/p multiple debridements, now with a wound vac in place. \par \par ON exam, her right breast wound is located in her inferior breast, measures about 8 x 1 cm and is about 1 cm deep; wet to dry dressing applied today  THere was no signs of worsening infection at this time.  \par \par I will have her follow up in 4 weeks for a wound check. \par She should continue with daily wet to dry dressing changes with dry gauze on top. \par She will follow up with Dr. Fishman from infectious disease, for further recommendations on her antibiotics regimen.  She is currently still taking the Augmentin. \par \par All of her questions were answered.  She knows to call back with any further questions or concerns. \par \par PLAN: \par -f/up in 4 weeks for a wound check

## 2021-07-20 ENCOUNTER — APPOINTMENT (OUTPATIENT)
Age: 76
End: 2021-07-20

## 2021-07-21 ENCOUNTER — NON-APPOINTMENT (OUTPATIENT)
Age: 76
End: 2021-07-21

## 2021-08-18 ENCOUNTER — APPOINTMENT (OUTPATIENT)
Dept: BREAST CENTER | Facility: CLINIC | Age: 76
End: 2021-08-18
Payer: MEDICARE

## 2021-08-18 VITALS
WEIGHT: 140 LBS | BODY MASS INDEX: 28.22 KG/M2 | HEIGHT: 59 IN | DIASTOLIC BLOOD PRESSURE: 70 MMHG | TEMPERATURE: 97.3 F | SYSTOLIC BLOOD PRESSURE: 145 MMHG

## 2021-08-18 PROCEDURE — 99212 OFFICE O/P EST SF 10 MIN: CPT

## 2021-08-21 NOTE — PAST MEDICAL HISTORY
[Menopause Age____] : age at menopause was [unfilled] [Total Preg ___] : G[unfilled] [Live Births ___] : P[unfilled]  [Age At Live Birth ___] : Age at live birth: [unfilled] [History of Hormone Replacement Treatment] : has no history of hormone replacement treatment [FreeTextEntry5] : basil/bso [FreeTextEntry6] : denies [FreeTextEntry8] : denies [FreeTextEntry7] : denies

## 2021-08-21 NOTE — ASSESSMENT
[FreeTextEntry1] : Elida is a 75 F with a necrotizing right breast soft tissue infection, s/p multiple debridements, now with a wound vac in place. \par \par ON exam, her right breast wound is located in her inferior breast, measures about 6 x 0.5 cm and is about 0.5 cm deep; wet to dry dressing applied today. \par \par I will have her follow up in 8 weeks for a wound check. \par She should continue with daily wet to dry dressing changes with dry gauze on top. \par She will follow up with Dr. Fishman from infectious disease, for further recommendations on her antibiotics regimen.  She is currently still taking the Augmentin. \par \par All of her questions were answered.  She knows to call back with any further questions or concerns. \par \par PLAN: \par -f/up in 8 weeks for a wound check

## 2021-08-21 NOTE — PHYSICAL EXAM
[Normocephalic] : normocephalic [Atraumatic] : atraumatic [EOMI] : extra ocular movement intact [No Supraclavicular Adenopathy] : no supraclavicular adenopathy [No Cervical Adenopathy] : no cervical adenopathy [de-identified] : her right breast wound is located in her inferior breast, measures about 6 x 0.5 cm and is about 0.5 cm deep; wet to dry dressing applied today

## 2021-10-26 ENCOUNTER — APPOINTMENT (OUTPATIENT)
Dept: BREAST CENTER | Facility: CLINIC | Age: 76
End: 2021-10-26

## 2021-11-23 ENCOUNTER — APPOINTMENT (OUTPATIENT)
Dept: BREAST CENTER | Facility: CLINIC | Age: 76
End: 2021-11-23

## 2022-01-24 NOTE — HISTORY OF PRESENT ILLNESS
[FreeTextEntry1] : Elida is a 75 F who presents with a R breast necrotizing soft tissue infection. \par \par She had a b/l dx mammogram and US on 4/30/2021 which revealed in her left axilla, a sebaceous cyst measuring 10 x 5 x 9 mm and a indeterminate isoechoic mass in her right retroareolar breast, measuring 8 x 5 x 7 mm, which a biopsy was recommended for. \par \par She had a R US guided biopsy on 5/11/2021 which revealed a ruptured epidermal inclusion cyst. \par After her biopsy, she started to develop worsening pain in her right breast, but was apparently was told by [facility] not to remove the outer dressing and was told that this is most likely related to her biopsy.  HOwever, she presented to the ED on 5/18/2021 with foul smelling discharge, and a partially necrotic breast associated with mastitis and erythema.  At that time, she had a WBC of 16, was tachycardic, but remained afebrile.  \par \par SHe underwent a bedside I and D on 5/18/2021 which revealed purulent drainage and necrotic breast and skin tissue.  \par \par SHe underwent an operative excisional debridement of her right breast infection on 5/20/2021 which revealed that over half of her breast tissue was involved in this necrotic tissue.  THis was debrided to healthy tissue and pulse irrigated with an antibiotic solution.  Of note, the infection appeared to be travelling in between her skin and breast tissue. \par \par SHe had another debridement of her right breast performed on 5/22/2021 and on 5/24/2021 at which time her infection did not appear to be worsening, so a wound vac was applied.  HOwever, she was evaluated by the plastic surgical team, Dr. Montana and the recommendation was to debride even more of her tissue as there was some parts of her skin that did not appear viable. \par \par She was taken back to the OR on 5/27/2021 with a more aggressive debridement performed, with only about 1/3-1/2 of her breast tissue remaining.  This was again irrigated with pulse irrigation and a wound vac was applied.  After this fourth debridement, her WBC continued to trend down, and normalized two days later.  She was discharged after her vitals normalized on 6/3/2021 with VNS for wound vac changes and sent home with a long term course of Augmentin, with recommendations to follow up with Dr. Fishman from infectious disease for further antibiotics recommendations.  \par \par SHe presents today with her daughter for a wound check.  She has not had any issues with her breast since her discharge, and denies any fevers or chills.  She only reports pain with the dressing changes and she is continuing with the Augmentin antibiotics. \par \par She has not yet met with Dr. Fishman for further antibiotics instructions. \par \par INTERVAL HISTORY: \americo Marrero returns for a 3 week follow up for a right breast necrotizing soft tissue infection. \par She has been doing well since her last visit.  She is continuing with the wound vac but her wound is now 8 x 4 x 2 cm in size. She denies any fevers or chills and is continuing to take augmentin. She has not yet met with Dr. Fishman from infectious disease.  \par \par 7/14/2021\americo Marrero returns for a 3 week follow up for a R breast necrotizing soft tissue infection. \par She has no new breast related complaints.  She is getting dressing changes every other day without any issues.  She denies any drainage, fevers or chills and is continuing on the antibiotics. \par \par 8/18/2021 \americo Marrero returns for a 1 month follow up for a R breast necrotizing soft tissue infection. \par She has no new breast related complaints.  She is continuing to get dressing changes twice weekly.  She is still on antibiotics. \par \par INTERVAL HISTORY 1/25/22\americo Marrero returns for a 6 months follow up for a R breast necrotizing soft tissue infection.

## 2022-01-24 NOTE — PHYSICAL EXAM
[de-identified] : her right breast wound is located in her inferior breast, measures about 6 x 0.5 cm and is about 0.5 cm deep; wet to dry dressing applied today

## 2022-01-24 NOTE — ASSESSMENT
[FreeTextEntry1] : Elida is a 76 F with a necrotizing right breast soft tissue infection, s/p multiple debridements, now with a wound vac in place. \par \par ON exam, her right breast wound is located in her inferior breast, measures about 6 x 0.5 cm and is about 0.5 cm deep; wet to dry dressing applied today. \par \par I will have her follow up in 8 weeks for a wound check. \par She should continue with daily wet to dry dressing changes with dry gauze on top. \par She will follow up with Dr. Fishman from infectious disease, for further recommendations on her antibiotics regimen.  She is currently still taking the Augmentin. \par \par All of her questions were answered.  She knows to call back with any further questions or concerns. \par \par PLAN: \par -b/l dx mammogram on 4/30/2022

## 2022-01-24 NOTE — PAST MEDICAL HISTORY
[History of Hormone Replacement Treatment] : has no history of hormone replacement treatment [FreeTextEntry5] : basil/bso [FreeTextEntry6] : denies [FreeTextEntry7] : denies [FreeTextEntry8] : denies

## 2022-01-25 ENCOUNTER — APPOINTMENT (OUTPATIENT)
Dept: BREAST CENTER | Facility: CLINIC | Age: 77
End: 2022-01-25

## 2022-02-03 ENCOUNTER — APPOINTMENT (OUTPATIENT)
Age: 77
End: 2022-02-03
Payer: MEDICARE

## 2022-02-03 VITALS
SYSTOLIC BLOOD PRESSURE: 130 MMHG | RESPIRATION RATE: 12 BRPM | DIASTOLIC BLOOD PRESSURE: 80 MMHG | BODY MASS INDEX: 28.22 KG/M2 | HEIGHT: 59 IN | OXYGEN SATURATION: 91 % | WEIGHT: 140 LBS | HEART RATE: 75 BPM

## 2022-02-03 PROCEDURE — 99213 OFFICE O/P EST LOW 20 MIN: CPT

## 2022-02-03 NOTE — HISTORY OF PRESENT ILLNESS
[Follow-Up - Routine Clinic] : a routine clinic follow-up of [None] : The patient is currently asymptomatic

## 2022-04-07 ENCOUNTER — APPOINTMENT (OUTPATIENT)
Age: 77
End: 2022-04-07

## 2022-04-13 ENCOUNTER — APPOINTMENT (OUTPATIENT)
Age: 77
End: 2022-04-13
Payer: COMMERCIAL

## 2022-04-13 VITALS
BODY MASS INDEX: 28.22 KG/M2 | WEIGHT: 140 LBS | RESPIRATION RATE: 14 BRPM | SYSTOLIC BLOOD PRESSURE: 120 MMHG | HEART RATE: 83 BPM | OXYGEN SATURATION: 94 % | DIASTOLIC BLOOD PRESSURE: 70 MMHG | HEIGHT: 59 IN

## 2022-04-13 PROCEDURE — 99072 ADDL SUPL MATRL&STAF TM PHE: CPT

## 2022-04-13 PROCEDURE — 99214 OFFICE O/P EST MOD 30 MIN: CPT

## 2022-04-13 RX ORDER — PREDNISONE 20 MG/1
20 TABLET ORAL
Qty: 6 | Refills: 0 | Status: ACTIVE | COMMUNITY
Start: 2022-04-13 | End: 1900-01-01

## 2022-04-13 NOTE — HISTORY OF PRESENT ILLNESS
[Cough] : cough [Productive Cough] : productive cough [Currently Experiencing] : The patient is currently experiencing symptoms. [Follow-Up - Routine Clinic] : a routine clinic follow-up of [None] : The patient is currently asymptomatic [Non-Productive Cough] : no non-productive cough [Dyspnea] : no dyspnea [Wheezing] : no wheezing

## 2022-04-13 NOTE — ASSESSMENT
[FreeTextEntry1] : Small lung nodule Awaiting repeat CT chest \par Post-viral RADS\par Former heavy smoker quit 1 year ago

## 2022-04-13 NOTE — REVIEW OF SYSTEMS
[Cough] : cough [Sputum] : sputum [Negative] : Endocrine [Wheezing] : no wheezing [A.M. Dry Mouth] : no a.m. dry mouth [SOB on Exertion] : no sob on exertion

## 2022-08-22 ENCOUNTER — APPOINTMENT (OUTPATIENT)
Age: 77
End: 2022-08-22

## 2022-10-12 ENCOUNTER — APPOINTMENT (OUTPATIENT)
Age: 77
End: 2022-10-12

## 2022-10-12 VITALS
OXYGEN SATURATION: 94 % | HEART RATE: 78 BPM | DIASTOLIC BLOOD PRESSURE: 78 MMHG | RESPIRATION RATE: 14 BRPM | WEIGHT: 136 LBS | SYSTOLIC BLOOD PRESSURE: 120 MMHG | HEIGHT: 59 IN | BODY MASS INDEX: 27.42 KG/M2

## 2022-10-12 PROCEDURE — 94010 BREATHING CAPACITY TEST: CPT

## 2022-10-12 PROCEDURE — 99214 OFFICE O/P EST MOD 30 MIN: CPT | Mod: 25

## 2022-10-12 PROCEDURE — 99072 ADDL SUPL MATRL&STAF TM PHE: CPT

## 2022-10-12 NOTE — HISTORY OF PRESENT ILLNESS
[Currently Experiencing] : The patient is currently experiencing symptoms. [Follow-Up - Routine Clinic] : a routine clinic follow-up of [Cough] : no cough [Productive Cough] : no productive cough [Non-Productive Cough] : no non-productive cough [Dyspnea] : no dyspnea [Wheezing] : no wheezing [None] : The patient is currently asymptomatic

## 2022-10-12 NOTE — ASSESSMENT
[FreeTextEntry1] : HO Small lung nodule.  Repeat CT chest excellent  \par Post-viral RADS stable \par Former heavy smoker quit 1 year ago

## 2023-01-16 NOTE — HISTORY OF PRESENT ILLNESS
[FreeTextEntry1] : Patient is a 77F with history of  R breast necrotizing soft tissue infection. \par \par She had a b/l dx mammogram and US on 4/30/2021 which revealed in her left axilla, a sebaceous cyst measuring 10 x 5 x 9 mm and a indeterminate isoechoic mass in her right retroareolar breast, measuring 8 x 5 x 7 mm, which a biopsy was recommended for. \par \par She had a R US guided biopsy on 5/11/2021 which revealed a ruptured epidermal inclusion cyst. \par After her biopsy, she started to develop worsening pain in her right breast, but was apparently was told by [facility] not to remove the outer dressing and was told that this is most likely related to her biopsy.  HOwever, she presented to the ED on 5/18/2021 with foul smelling discharge, and a partially necrotic breast associated with mastitis and erythema.  At that time, she had a WBC of 16, was tachycardic, but remained afebrile.  \par \par SHe underwent a bedside I and D on 5/18/2021 which revealed purulent drainage and necrotic breast and skin tissue.  \par \par SHe underwent an operative excisional debridement of her right breast infection on 5/20/2021 which revealed that over half of her breast tissue was involved in this necrotic tissue.  THis was debrided to healthy tissue and pulse irrigated with an antibiotic solution.  Of note, the infection appeared to be travelling in between her skin and breast tissue. \par \par SHe had another debridement of her right breast performed on 5/22/2021 and on 5/24/2021 at which time her infection did not appear to be worsening, so a wound vac was applied.  However, she was evaluated by the plastic surgical team, Dr. Montana and the recommendation was to debride even more of her tissue as there was some parts of her skin that did not appear viable. \par \par She was taken back to the OR on 5/27/2021 with a more aggressive debridement performed, with only about 1/3-1/2 of her breast tissue remaining.  This was again irrigated with pulse irrigation and a wound vac was applied.  After this fourth debridement, her WBC continued to trend down, and normalized two days later.  She was discharged after her vitals normalized on 6/3/2021 with VNS for wound vac changes and sent home with a long term course of Augmentin, with recommendations to follow up with Dr. Fishman from infectious disease for further antibiotics recommendations.  \par \par She was seen in 8/2021 at which point she was still on antibiotics and was undergoing we to dry dressing changes BID. She was to follow up in 8 weeks but has not done so.\par \par Her most recent imaging is as follows:\par 2/22/22: B dx mmg/us --> BIRADS 3\par Scattered areas of density\par MMG: right breast post surgical changes, left kiera like calcifications suggestive of secretory type calcifications\par US: R 7N2 0.5cm hypoechoic lesion\par           9N1 0.5cm hypoechoic lesion \par Recommend short term followup US\par \par 11/21/22: R US --> BIRADS 2\par Resolution of right 7 and 9:00 lesions\par Recommends annual screening\par \par

## 2023-01-17 ENCOUNTER — APPOINTMENT (OUTPATIENT)
Dept: BREAST CENTER | Facility: CLINIC | Age: 78
End: 2023-01-17

## 2023-03-22 ENCOUNTER — APPOINTMENT (OUTPATIENT)
Dept: BREAST CENTER | Facility: CLINIC | Age: 78
End: 2023-03-22
Payer: MEDICARE

## 2023-03-22 VITALS
WEIGHT: 136 LBS | DIASTOLIC BLOOD PRESSURE: 76 MMHG | SYSTOLIC BLOOD PRESSURE: 122 MMHG | BODY MASS INDEX: 27.42 KG/M2 | HEIGHT: 59 IN

## 2023-03-22 DIAGNOSIS — M79.89 OTHER SPECIFIED SOFT TISSUE DISORDERS: ICD-10-CM

## 2023-03-22 PROCEDURE — 99212 OFFICE O/P EST SF 10 MIN: CPT

## 2023-03-22 NOTE — PHYSICAL EXAM
[Normocephalic] : normocephalic [Atraumatic] : atraumatic [No Supraclavicular Adenopathy] : no supraclavicular adenopathy [No dominant masses] : no dominant masses in right breast  [No dominant masses] : no dominant masses left breast [No Nipple Discharge] : no left nipple discharge [No Rashes] : no rashes [No Ulceration] : no ulceration [Breast Nipple Inversion Left] : nipple not inverted [Breast Nipple Retraction Left] : nipple not retracted [de-identified] : postsurgical changes; scar tissue. [de-identified] : No axillary lymphadenopathy appreciated. [de-identified] : No axillary lymphadenopathy appreciated.

## 2023-03-22 NOTE — REASON FOR VISIT
[Follow-Up: _____] : a [unfilled] follow-up visit [Family Member] : family member [FreeTextEntry1] : h/o R breast necrotizing soft tissue infection.

## 2023-03-22 NOTE — ASSESSMENT
[FreeTextEntry1] : CASSIDY NOE is a 77 year old female patient who presents today in follow up for h/o R breast necrotizing soft tissue infection. \par Since her last visit, she has been feeling well.  She has complaints of continued sporadic right breast pain.\par \par Imaging results:\par B/L Dx Mammo & Sono - 02/22/2022:\par -There are scattered areas of fibroglandular density.\par -There are postsurgical changes of the right breast. \par -There are numerous rodlike calcifications in the left breast suggestive of secretory type calcifications. \par BREAST ULTRASOUND:  \par -No suspicious solid or complex cystic mass is detected in the left breast. \par -There is no lymphadenopathy in the left axilla. \par There is a 0.5 cm oval hypoechoic lesion in the right breast 7:00 position 2 cm from the nipple and a 0.5 cm oval hypoechoic lesion in the right breast 9:00 position 1 cm from the nipple.\par ASSESSMENT: BI-RADS Category 3:  Probably benign. \par \par RIght Targeted Sono - 11/21/2022:\par -There is a resolution of the right breast 7:00 and right breast 9:00 lesions suggestive of benign etiology.\par No ultrasonographic evidence of malignancy.\par ASSESSMENT: BI-RADS Category 2:  Benign. \par \par She has not yet had her annual screening imaging.\par \par On physical exam, postsurgical changes; scar tissue noted of the right breast.\par \par She is due for annual screening mammogram and sonogram at this time - prescription provided.\par We will discuss these results when they are available.\par If benign, she will return to the office in one year following annual screening imaging for 2024.\par \par I spent a total of 15 minutes of face to face time with this patient, greater than 50% of which was spent in counseling and/or coordination of care.\par All of her questions were appropriately answered.\par She knows to call with any concerns.\par

## 2023-03-22 NOTE — HISTORY OF PRESENT ILLNESS
[FreeTextEntry1] : Elida is a 75 F who presents with a R breast necrotizing soft tissue infection. \par \par She had a b/l dx mammogram and US on 4/30/2021 which revealed in her left axilla, a sebaceous cyst measuring 10 x 5 x 9 mm and a indeterminate isoechoic mass in her right retroareolar breast, measuring 8 x 5 x 7 mm, which a biopsy was recommended for. \par \par She had a R US guided biopsy on 5/11/2021 which revealed a ruptured epidermal inclusion cyst. \par After her biopsy, she started to develop worsening pain in her right breast, but was apparently was told by [facility] not to remove the outer dressing and was told that this is most likely related to her biopsy.  HOwever, she presented to the ED on 5/18/2021 with foul smelling discharge, and a partially necrotic breast associated with mastitis and erythema.  At that time, she had a WBC of 16, was tachycardic, but remained afebrile.  \par \par SHe underwent a bedside I and D on 5/18/2021 which revealed purulent drainage and necrotic breast and skin tissue.  \par \par SHe underwent an operative excisional debridement of her right breast infection on 5/20/2021 which revealed that over half of her breast tissue was involved in this necrotic tissue.  THis was debrided to healthy tissue and pulse irrigated with an antibiotic solution.  Of note, the infection appeared to be travelling in between her skin and breast tissue. \par \par SHe had another debridement of her right breast performed on 5/22/2021 and on 5/24/2021 at which time her infection did not appear to be worsening, so a wound vac was applied.  HOwever, she was evaluated by the plastic surgical team, Dr. Montana and the recommendation was to debride even more of her tissue as there was some parts of her skin that did not appear viable. \par \par She was taken back to the OR on 5/27/2021 with a more aggressive debridement performed, with only about 1/3-1/2 of her breast tissue remaining.  This was again irrigated with pulse irrigation and a wound vac was applied.  After this fourth debridement, her WBC continued to trend down, and normalized two days later.  She was discharged after her vitals normalized on 6/3/2021 with VNS for wound vac changes and sent home with a long term course of Augmentin, with recommendations to follow up with Dr. Fishman from infectious disease for further antibiotics recommendations.  \par \par SHe presents today with her daughter for a wound check.  She has not had any issues with her breast since her discharge, and denies any fevers or chills.  She only reports pain with the dressing changes and she is continuing with the Augmentin antibiotics. \par \par She has not yet met with Dr. Fishman for further antibiotics instructions. \par \par INTERVAL HISTORY: manda Marrero returns for a 3 week follow up for a right breast necrotizing soft tissue infection. \par She has been doing well since her last visit.  She is continuing with the wound vac but her wound is now 8 x 4 x 2 cm in size. She denies any fevers or chills and is continuing to take augmentin. She has not yet met with Dr. Fishman from infectious disease.  \par \par 7/14/2021manda Marrero returns for a 3 week follow up for a R breast necrotizing soft tissue infection. \par She has no new breast related complaints.  She is getting dressing changes every other day without any issues.  She denies any drainage, fevers or chills and is continuing on the antibiotics. \par \par 8/18/2021 manda Marrero returns for a 1 month follow up for a R breast necrotizing soft tissue infection. \par She has no new breast related complaints.  She is continuing to get dressing changes twice weekly.  She is still on antibiotics. \par \par 03/22/2023 --\par ELIDA NOE is a 77 year old female patient who presents today in follow up for h/o R breast necrotizing soft tissue infection. \par Since her last visit, she has been feeling well.  She has complaints of continued sporadic right breast pain.\par \par Imaging results:\par B/L Dx Mammo & Sono - 02/22/2022:\par -There are scattered areas of fibroglandular density.\par -There are postsurgical changes of the right breast. \par -There are numerous rodlike calcifications in the left breast suggestive of secretory type calcifications. \par BREAST ULTRASOUND:  \par -No suspicious solid or complex cystic mass is detected in the left breast. \par -There is no lymphadenopathy in the left axilla. \par There is a 0.5 cm oval hypoechoic lesion in the right breast 7:00 position 2 cm from the nipple and a 0.5 cm oval hypoechoic lesion in the right breast 9:00 position 1 cm from the nipple.\par ASSESSMENT: BI-RADS Category 3:  Probably benign. \par \par RIght Targeted Sono - 11/21/2022:\par -There is a resolution of the right breast 7:00 and right breast 9:00 lesions suggestive of benign etiology.\par No ultrasonographic evidence of malignancy.\par ASSESSMENT: BI-RADS Category 2:  Benign. \par \par \par She has not yet had her annual screening imaging.\par \par She presents today for evaluation.\par \par

## 2023-03-22 NOTE — DATA REVIEWED
[FreeTextEntry1] : B/L Dx Mammo & Sono - 02/22/2022:\par MAMMOGRAM:\par \par TECHNIQUE: Full-field digital mammography of bilateral breasts was obtained. Additional imaging is composed of CC and MLO views Low-dose full-field digital breast tomosynthesis examination was performed with tomosynthesis acquisitions and synthesized 2D reconstructed mammogram. Computer-aided detection (CAD) was utilized.  \par \par FINDINGS:\par BREAST COMPOSITION: There are scattered areas of fibroglandular density.\par \par No suspicious masses, architectural distortion, or significant calcifications are detected. There are postsurgical changes of the right breast. There are numerous rodlike calcifications in the left breast suggestive of secretory type calcifications. \par \par BREAST ULTRASOUND:  \par \par TECHNIQUE: Complete bilateral breast ultrasound, with evaluation of the four quadrants, retroareolar regions and axillae, was performed. \par \par FINDINGS: \par BREAST ECHOTEXTURE: There is a homogeneous background echotexture - fibroglandular. \par \par No suspicious solid or complex cystic mass is detected in the left breast. There is no lymphadenopathy in the left axilla. There is a 0.5 cm oval hypoechoic lesion in the right breast 7:00 position 2 cm from the nipple and a 0.5 cm oval hypoechoic lesion in the right breast 9:00 position 1 cm from the nipple.\par \par IMPRESSION: Right breast sonographic lesions are probably benign. Six-month follow-up targeted breast ultrasound is recommended to document stability. Please note if prior outside breast imaging studies are made available this would provide additional information. \par \par FOLLOW-UP: Follow-up imaging in 6 months. \par \par \par ASSESSMENT: BI-RADS Category 3:  Probably benign. \par \par \par RIght Targeted Sono - 11/21/2022:\par FINDINGS:\par \par There is a resolution of the right breast 7:00 and right breast 9:00 lesions suggestive of benign etiology.\par \par IMPRESSION: No ultrasonographic evidence of malignancy.\par \par FOLLOW-UP: Annual screening.\par \par ASSESSMENT: BI-RADS Category 2:  Benign.

## 2023-06-28 ENCOUNTER — APPOINTMENT (OUTPATIENT)
Dept: PULMONOLOGY | Facility: CLINIC | Age: 78
End: 2023-06-28
Payer: MEDICARE

## 2023-06-28 VITALS
HEIGHT: 59 IN | DIASTOLIC BLOOD PRESSURE: 70 MMHG | SYSTOLIC BLOOD PRESSURE: 130 MMHG | HEART RATE: 80 BPM | RESPIRATION RATE: 14 BRPM | WEIGHT: 125 LBS | OXYGEN SATURATION: 98 % | BODY MASS INDEX: 25.2 KG/M2

## 2023-06-28 DIAGNOSIS — J45.998 OTHER ASTHMA: ICD-10-CM

## 2023-06-28 PROCEDURE — 99213 OFFICE O/P EST LOW 20 MIN: CPT

## 2023-06-28 NOTE — ASSESSMENT
[FreeTextEntry1] : HO Small lung nodule.\par Post-viral RADS stable \par Former heavy smoker quit in 2021

## 2023-06-28 NOTE — HISTORY OF PRESENT ILLNESS
[Cough] : no cough [Productive Cough] : no productive cough [Non-Productive Cough] : no non-productive cough [Dyspnea] : no dyspnea [Wheezing] : no wheezing [Currently Experiencing] : The patient is currently experiencing symptoms. [Follow-Up - Routine Clinic] : a routine clinic follow-up of [None] : The patient is currently asymptomatic

## 2023-06-28 NOTE — REVIEW OF SYSTEMS
[Cough] : cough [Sputum] : sputum [Wheezing] : no wheezing [A.M. Dry Mouth] : no a.m. dry mouth [SOB on Exertion] : no sob on exertion [Negative] : Endocrine

## 2023-08-03 ENCOUNTER — APPOINTMENT (OUTPATIENT)
Dept: PULMONOLOGY | Facility: CLINIC | Age: 78
End: 2023-08-03
Payer: MEDICARE

## 2023-08-03 PROCEDURE — 94727 GAS DIL/WSHOT DETER LNG VOL: CPT

## 2023-08-03 PROCEDURE — 94010 BREATHING CAPACITY TEST: CPT

## 2023-08-03 PROCEDURE — 94729 DIFFUSING CAPACITY: CPT

## 2023-10-17 ENCOUNTER — APPOINTMENT (OUTPATIENT)
Dept: PULMONOLOGY | Facility: CLINIC | Age: 78
End: 2023-10-17
Payer: MEDICARE

## 2023-10-17 DIAGNOSIS — R91.1 SOLITARY PULMONARY NODULE: ICD-10-CM

## 2023-10-17 DIAGNOSIS — J44.9 CHRONIC OBSTRUCTIVE PULMONARY DISEASE, UNSPECIFIED: ICD-10-CM

## 2023-10-17 DIAGNOSIS — R05.3 CHRONIC COUGH: ICD-10-CM

## 2023-10-17 PROCEDURE — 99214 OFFICE O/P EST MOD 30 MIN: CPT | Mod: 25

## 2023-10-17 PROCEDURE — G0296 VISIT TO DETERM LDCT ELIG: CPT

## 2023-10-17 RX ORDER — UMECLIDINIUM BROMIDE AND VILANTEROL TRIFENATATE 62.5; 25 UG/1; UG/1
62.5-25 POWDER RESPIRATORY (INHALATION)
Qty: 60 | Refills: 5 | Status: ACTIVE | COMMUNITY
Start: 2023-10-17 | End: 1900-01-01

## 2024-04-03 ENCOUNTER — APPOINTMENT (OUTPATIENT)
Dept: BREAST CENTER | Facility: CLINIC | Age: 79
End: 2024-04-03

## 2024-07-15 DIAGNOSIS — R92.8 OTHER ABNORMAL AND INCONCLUSIVE FINDINGS ON DIAGNOSTIC IMAGING OF BREAST: ICD-10-CM

## 2024-07-24 ENCOUNTER — APPOINTMENT (OUTPATIENT)
Dept: BREAST CENTER | Facility: CLINIC | Age: 79
End: 2024-07-24